# Patient Record
Sex: MALE | Employment: FULL TIME | URBAN - METROPOLITAN AREA
[De-identification: names, ages, dates, MRNs, and addresses within clinical notes are randomized per-mention and may not be internally consistent; named-entity substitution may affect disease eponyms.]

---

## 2022-10-04 ENCOUNTER — APPOINTMENT (OUTPATIENT)
Dept: RADIOLOGY | Facility: MEDICAL CENTER | Age: 57
DRG: 871 | End: 2022-10-04
Attending: STUDENT IN AN ORGANIZED HEALTH CARE EDUCATION/TRAINING PROGRAM
Payer: COMMERCIAL

## 2022-10-04 ENCOUNTER — HOSPITAL ENCOUNTER (INPATIENT)
Facility: MEDICAL CENTER | Age: 57
LOS: 15 days | DRG: 871 | End: 2022-10-19
Attending: EMERGENCY MEDICINE | Admitting: HOSPITALIST
Payer: COMMERCIAL

## 2022-10-04 DIAGNOSIS — R41.82 ALTERED MENTAL STATUS, UNSPECIFIED ALTERED MENTAL STATUS TYPE: ICD-10-CM

## 2022-10-04 DIAGNOSIS — S80.862A TICK BITE OF LEFT LOWER LEG, INITIAL ENCOUNTER: ICD-10-CM

## 2022-10-04 DIAGNOSIS — G93.40 ACUTE ENCEPHALOPATHY: ICD-10-CM
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
## 2022-10-04 DIAGNOSIS — R56.9 SEIZURE (HCC): ICD-10-CM

## 2022-10-04 PROBLEM — J96.01 ACUTE RESPIRATORY FAILURE WITH HYPOXIA (HCC): Status: ACTIVE | Noted: 2022-10-04

## 2022-10-04 PROBLEM — I48.0 PAROXYSMAL A-FIB (HCC): Status: ACTIVE | Noted: 2022-10-04

## 2022-10-04 LAB
ALBUMIN SERPL BCP-MCNC: 3.1 G/DL (ref 3.2–4.9)
ALBUMIN/GLOB SERPL: 1 G/DL
ALP SERPL-CCNC: 66 U/L (ref 30–99)
ALT SERPL-CCNC: 82 U/L (ref 2–50)
AMORPH CRY #/AREA URNS HPF: PRESENT /HPF
ANION GAP SERPL CALC-SCNC: 12 MMOL/L (ref 7–16)
APPEARANCE UR: ABNORMAL
AST SERPL-CCNC: 49 U/L (ref 12–45)
B PARAP IS1001 DNA NPH QL NAA+NON-PROBE: NOT DETECTED
B PERT.PT PRMT NPH QL NAA+NON-PROBE: NOT DETECTED
BACTERIA #/AREA URNS HPF: ABNORMAL /HPF
BASE EXCESS BLDA CALC-SCNC: 7 MMOL/L (ref -4–3)
BASOPHILS # BLD AUTO: 0.8 % (ref 0–1.8)
BASOPHILS # BLD: 0.1 K/UL (ref 0–0.12)
BILIRUB SERPL-MCNC: 0.3 MG/DL (ref 0.1–1.5)
BILIRUB UR QL STRIP.AUTO: NEGATIVE
BODY TEMPERATURE: ABNORMAL DEGREES
BREATHS SETTING VENT: 18
BUN SERPL-MCNC: 37 MG/DL (ref 8–22)
C PNEUM DNA NPH QL NAA+NON-PROBE: NOT DETECTED
CALCIUM SERPL-MCNC: 7.8 MG/DL (ref 8.5–10.5)
CHLORIDE SERPL-SCNC: 101 MMOL/L (ref 96–112)
CO2 BLDA-SCNC: 31 MMOL/L (ref 20–33)
CO2 SERPL-SCNC: 27 MMOL/L (ref 20–33)
COLOR UR: YELLOW
CREAT SERPL-MCNC: 1.14 MG/DL (ref 0.5–1.4)
CRP SERPL HS-MCNC: 3.28 MG/DL (ref 0–0.75)
CRYPTOC AG SER QL LA: NEGATIVE
DELSYS IDSYS: ABNORMAL
EOSINOPHIL # BLD AUTO: 0 K/UL (ref 0–0.51)
EOSINOPHIL NFR BLD: 0 % (ref 0–6.9)
EPI CELLS #/AREA URNS HPF: NEGATIVE /HPF
ERYTHROCYTE [DISTWIDTH] IN BLOOD BY AUTOMATED COUNT: 45.1 FL (ref 35.9–50)
ERYTHROCYTE [SEDIMENTATION RATE] IN BLOOD BY WESTERGREN METHOD: 15 MM/HOUR (ref 0–20)
FLUAV RNA NPH QL NAA+NON-PROBE: NOT DETECTED
FLUAV RNA SPEC QL NAA+PROBE: NEGATIVE
FLUBV RNA NPH QL NAA+NON-PROBE: NOT DETECTED
FLUBV RNA SPEC QL NAA+PROBE: NEGATIVE
GFR SERPLBLD CREATININE-BSD FMLA CKD-EPI: 76 ML/MIN/1.73 M 2
GLOBULIN SER CALC-MCNC: 3 G/DL (ref 1.9–3.5)
GLUCOSE SERPL-MCNC: 181 MG/DL (ref 65–99)
GLUCOSE UR STRIP.AUTO-MCNC: NEGATIVE MG/DL
GRAM STN SPEC: NORMAL
HADV DNA NPH QL NAA+NON-PROBE: NOT DETECTED
HCO3 BLDA-SCNC: 30 MMOL/L (ref 17–25)
HCOV 229E RNA NPH QL NAA+NON-PROBE: NOT DETECTED
HCOV HKU1 RNA NPH QL NAA+NON-PROBE: NOT DETECTED
HCOV NL63 RNA NPH QL NAA+NON-PROBE: NOT DETECTED
HCOV OC43 RNA NPH QL NAA+NON-PROBE: NOT DETECTED
HCT VFR BLD AUTO: 42.2 % (ref 42–52)
HGB BLD-MCNC: 13.5 G/DL (ref 14–18)
HIV 1+2 AB+HIV1 P24 AG SERPL QL IA: NORMAL
HMPV RNA NPH QL NAA+NON-PROBE: NOT DETECTED
HOROWITZ INDEX BLDA+IHG-RTO: 142 MM[HG]
HPIV1 RNA NPH QL NAA+NON-PROBE: NOT DETECTED
HPIV2 RNA NPH QL NAA+NON-PROBE: NOT DETECTED
HPIV3 RNA NPH QL NAA+NON-PROBE: NOT DETECTED
HPIV4 RNA NPH QL NAA+NON-PROBE: NOT DETECTED
HYALINE CASTS #/AREA URNS LPF: ABNORMAL /LPF
KETONES UR STRIP.AUTO-MCNC: NEGATIVE MG/DL
LACTATE SERPL-SCNC: 1.8 MMOL/L (ref 0.5–2)
LACTATE SERPL-SCNC: 2.6 MMOL/L (ref 0.5–2)
LEUKOCYTE ESTERASE UR QL STRIP.AUTO: ABNORMAL
LYMPHOCYTES # BLD AUTO: 5.38 K/UL (ref 1–4.8)
LYMPHOCYTES NFR BLD: 44.1 % (ref 22–41)
M PNEUMO DNA NPH QL NAA+NON-PROBE: NOT DETECTED
MAGNESIUM SERPL-MCNC: 2.8 MG/DL (ref 1.5–2.5)
MANUAL DIFF BLD: NORMAL
MCH RBC QN AUTO: 26 PG (ref 27–33)
MCHC RBC AUTO-ENTMCNC: 32 G/DL (ref 33.7–35.3)
MCV RBC AUTO: 81.3 FL (ref 81.4–97.8)
MICRO URNS: ABNORMAL
MODE IMODE: ABNORMAL
MONOCYTES # BLD AUTO: 1.87 K/UL (ref 0–0.85)
MONOCYTES NFR BLD AUTO: 15.3 % (ref 0–13.4)
MORPHOLOGY BLD-IMP: NORMAL
NEUTROPHILS # BLD AUTO: 4.86 K/UL (ref 1.82–7.42)
NEUTROPHILS NFR BLD: 39.8 % (ref 44–72)
NITRITE UR QL STRIP.AUTO: NEGATIVE
NRBC # BLD AUTO: 0 K/UL
NRBC BLD-RTO: 0 /100 WBC
NT-PROBNP SERPL IA-MCNC: 32 PG/ML (ref 0–125)
O2/TOTAL GAS SETTING VFR VENT: 50 %
PCO2 BLDA: 35.7 MMHG (ref 26–37)
PCO2 TEMP ADJ BLDA: 36.7 MMHG (ref 26–37)
PEEP END EXPIRATORY PRESSURE IPEEP: 10 CMH20
PH BLDA: 7.53 [PH] (ref 7.4–7.5)
PH TEMP ADJ BLDA: 7.52 [PH] (ref 7.4–7.5)
PH UR STRIP.AUTO: 6 [PH] (ref 5–8)
PHOSPHATE SERPL-MCNC: 2.7 MG/DL (ref 2.5–4.5)
PLATELET # BLD AUTO: 221 K/UL (ref 164–446)
PLATELET BLD QL SMEAR: NORMAL
PMV BLD AUTO: 9.5 FL (ref 9–12.9)
PO2 BLDA: 71 MMHG (ref 64–87)
PO2 TEMP ADJ BLDA: 74 MMHG (ref 64–87)
POTASSIUM SERPL-SCNC: 3.9 MMOL/L (ref 3.6–5.5)
PROCALCITONIN SERPL-MCNC: 0.31 NG/ML
PROT SERPL-MCNC: 6.1 G/DL (ref 6–8.2)
PROT UR QL STRIP: NEGATIVE MG/DL
RBC # BLD AUTO: 5.19 M/UL (ref 4.7–6.1)
RBC # URNS HPF: ABNORMAL /HPF
RBC BLD AUTO: PRESENT
RBC UR QL AUTO: ABNORMAL
RSV RNA NPH QL NAA+NON-PROBE: NOT DETECTED
RSV RNA SPEC QL NAA+PROBE: NEGATIVE
RV+EV RNA NPH QL NAA+NON-PROBE: NOT DETECTED
SAO2 % BLDA: 96 % (ref 93–99)
SARS-COV-2 RNA NPH QL NAA+NON-PROBE: NOTDETECTED
SARS-COV-2 RNA RESP QL NAA+PROBE: NOTDETECTED
SCCMEC + MECA PNL NOSE NAA+PROBE: NEGATIVE
SIGNIFICANT IND 70042: NORMAL
SITE SITE: NORMAL
SMUDGE CELLS BLD QL SMEAR: NORMAL
SODIUM SERPL-SCNC: 140 MMOL/L (ref 135–145)
SOURCE SOURCE: NORMAL
SP GR UR STRIP.AUTO: 1.02
SPECIMEN DRAWN FROM PATIENT: ABNORMAL
SPECIMEN SOURCE: NORMAL
TIDAL VOLUME IVT: 500 ML
TROPONIN T SERPL-MCNC: 7 NG/L (ref 6–19)
UROBILINOGEN UR STRIP.AUTO-MCNC: 0.2 MG/DL
WBC # BLD AUTO: 12.2 K/UL (ref 4.8–10.8)
WBC #/AREA URNS HPF: ABNORMAL /HPF

## 2022-10-04 PROCEDURE — 84145 PROCALCITONIN (PCT): CPT

## 2022-10-04 PROCEDURE — 87086 URINE CULTURE/COLONY COUNT: CPT

## 2022-10-04 PROCEDURE — 87899 AGENT NOS ASSAY W/OPTIC: CPT

## 2022-10-04 PROCEDURE — 83516 IMMUNOASSAY NONANTIBODY: CPT | Mod: 91

## 2022-10-04 PROCEDURE — 83880 ASSAY OF NATRIURETIC PEPTIDE: CPT

## 2022-10-04 PROCEDURE — 85007 BL SMEAR W/DIFF WBC COUNT: CPT

## 2022-10-04 PROCEDURE — 81001 URINALYSIS AUTO W/SCOPE: CPT

## 2022-10-04 PROCEDURE — A9270 NON-COVERED ITEM OR SERVICE: HCPCS | Performed by: STUDENT IN AN ORGANIZED HEALTH CARE EDUCATION/TRAINING PROGRAM

## 2022-10-04 PROCEDURE — 87476 LYME DIS DNA AMP PROBE: CPT

## 2022-10-04 PROCEDURE — 94799 UNLISTED PULMONARY SVC/PX: CPT

## 2022-10-04 PROCEDURE — 700102 HCHG RX REV CODE 250 W/ 637 OVERRIDE(OP): Performed by: STUDENT IN AN ORGANIZED HEALTH CARE EDUCATION/TRAINING PROGRAM

## 2022-10-04 PROCEDURE — 87641 MR-STAPH DNA AMP PROBE: CPT

## 2022-10-04 PROCEDURE — 84484 ASSAY OF TROPONIN QUANT: CPT

## 2022-10-04 PROCEDURE — 82803 BLOOD GASES ANY COMBINATION: CPT

## 2022-10-04 PROCEDURE — 86403 PARTICLE AGGLUT ANTBDY SCRN: CPT

## 2022-10-04 PROCEDURE — 87389 HIV-1 AG W/HIV-1&-2 AB AG IA: CPT

## 2022-10-04 PROCEDURE — 86618 LYME DISEASE ANTIBODY: CPT | Mod: 91

## 2022-10-04 PROCEDURE — 80053 COMPREHEN METABOLIC PANEL: CPT

## 2022-10-04 PROCEDURE — 71045 X-RAY EXAM CHEST 1 VIEW: CPT

## 2022-10-04 PROCEDURE — 94002 VENT MGMT INPAT INIT DAY: CPT

## 2022-10-04 PROCEDURE — 99291 CRITICAL CARE FIRST HOUR: CPT | Performed by: STUDENT IN AN ORGANIZED HEALTH CARE EDUCATION/TRAINING PROGRAM

## 2022-10-04 PROCEDURE — 700105 HCHG RX REV CODE 258: Performed by: NURSE PRACTITIONER

## 2022-10-04 PROCEDURE — 70450 CT HEAD/BRAIN W/O DYE: CPT

## 2022-10-04 PROCEDURE — 86140 C-REACTIVE PROTEIN: CPT

## 2022-10-04 PROCEDURE — 700111 HCHG RX REV CODE 636 W/ 250 OVERRIDE (IP): Performed by: STUDENT IN AN ORGANIZED HEALTH CARE EDUCATION/TRAINING PROGRAM

## 2022-10-04 PROCEDURE — C9803 HOPD COVID-19 SPEC COLLECT: HCPCS | Performed by: STUDENT IN AN ORGANIZED HEALTH CARE EDUCATION/TRAINING PROGRAM

## 2022-10-04 PROCEDURE — 87486 CHLMYD PNEUM DNA AMP PROBE: CPT

## 2022-10-04 PROCEDURE — 770022 HCHG ROOM/CARE - ICU (200)

## 2022-10-04 PROCEDURE — 86635 COCCIDIOIDES ANTIBODY: CPT | Mod: 91

## 2022-10-04 PROCEDURE — 87798 DETECT AGENT NOS DNA AMP: CPT

## 2022-10-04 PROCEDURE — 700101 HCHG RX REV CODE 250: Performed by: STUDENT IN AN ORGANIZED HEALTH CARE EDUCATION/TRAINING PROGRAM

## 2022-10-04 PROCEDURE — 700105 HCHG RX REV CODE 258: Performed by: STUDENT IN AN ORGANIZED HEALTH CARE EDUCATION/TRAINING PROGRAM

## 2022-10-04 PROCEDURE — 87305 ASPERGILLUS AG IA: CPT

## 2022-10-04 PROCEDURE — 87040 BLOOD CULTURE FOR BACTERIA: CPT

## 2022-10-04 PROCEDURE — 84100 ASSAY OF PHOSPHORUS: CPT

## 2022-10-04 PROCEDURE — 87070 CULTURE OTHR SPECIMN AEROBIC: CPT

## 2022-10-04 PROCEDURE — 87449 NOS EACH ORGANISM AG IA: CPT | Mod: 91

## 2022-10-04 PROCEDURE — 87633 RESP VIRUS 12-25 TARGETS: CPT

## 2022-10-04 PROCEDURE — 83735 ASSAY OF MAGNESIUM: CPT

## 2022-10-04 PROCEDURE — 87205 SMEAR GRAM STAIN: CPT

## 2022-10-04 PROCEDURE — 36600 WITHDRAWAL OF ARTERIAL BLOOD: CPT

## 2022-10-04 PROCEDURE — 85652 RBC SED RATE AUTOMATED: CPT

## 2022-10-04 PROCEDURE — 85025 COMPLETE CBC W/AUTO DIFF WBC: CPT

## 2022-10-04 PROCEDURE — 0241U HCHG SARS-COV-2 COVID-19 NFCT DS RESP RNA 4 TRGT MIC: CPT

## 2022-10-04 PROCEDURE — 86757 RICKETTSIA ANTIBODY: CPT | Mod: 91

## 2022-10-04 PROCEDURE — 83605 ASSAY OF LACTIC ACID: CPT | Mod: 91

## 2022-10-04 PROCEDURE — 87581 M.PNEUMON DNA AMP PROBE: CPT

## 2022-10-04 RX ORDER — BISACODYL 10 MG
10 SUPPOSITORY, RECTAL RECTAL
Status: DISCONTINUED | OUTPATIENT
Start: 2022-10-04 | End: 2022-10-05

## 2022-10-04 RX ORDER — DEXMEDETOMIDINE HYDROCHLORIDE 4 UG/ML
.1-1.5 INJECTION, SOLUTION INTRAVENOUS CONTINUOUS
Status: DISCONTINUED | OUTPATIENT
Start: 2022-10-04 | End: 2022-10-05

## 2022-10-04 RX ORDER — AMOXICILLIN 250 MG
2 CAPSULE ORAL 2 TIMES DAILY
Status: DISCONTINUED | OUTPATIENT
Start: 2022-10-04 | End: 2022-10-05

## 2022-10-04 RX ORDER — ENOXAPARIN SODIUM 100 MG/ML
40 INJECTION SUBCUTANEOUS DAILY
Status: DISCONTINUED | OUTPATIENT
Start: 2022-10-04 | End: 2022-10-07

## 2022-10-04 RX ORDER — DOXYCYCLINE 100 MG/1
100 TABLET ORAL EVERY 12 HOURS
Status: DISCONTINUED | OUTPATIENT
Start: 2022-10-04 | End: 2022-10-04

## 2022-10-04 RX ORDER — POLYETHYLENE GLYCOL 3350 17 G/17G
1 POWDER, FOR SOLUTION ORAL
Status: DISCONTINUED | OUTPATIENT
Start: 2022-10-04 | End: 2022-10-05

## 2022-10-04 RX ORDER — SODIUM CHLORIDE 9 MG/ML
500 INJECTION, SOLUTION INTRAVENOUS ONCE
Status: COMPLETED | OUTPATIENT
Start: 2022-10-04 | End: 2022-10-05

## 2022-10-04 RX ADMIN — AMPICILLIN SODIUM 2000 MG: 2 INJECTION, POWDER, FOR SOLUTION INTRAMUSCULAR; INTRAVENOUS at 22:27

## 2022-10-04 RX ADMIN — ENOXAPARIN SODIUM 40 MG: 40 INJECTION SUBCUTANEOUS at 18:17

## 2022-10-04 RX ADMIN — FENTANYL CITRATE 100 MCG: 50 INJECTION, SOLUTION INTRAMUSCULAR; INTRAVENOUS at 16:51

## 2022-10-04 RX ADMIN — SODIUM CHLORIDE 500 ML: 9 INJECTION, SOLUTION INTRAVENOUS at 23:30

## 2022-10-04 RX ADMIN — CEFTRIAXONE SODIUM 2 G: 10 INJECTION, POWDER, FOR SOLUTION INTRAVENOUS at 16:27

## 2022-10-04 RX ADMIN — FENTANYL CITRATE 100 MCG: 50 INJECTION, SOLUTION INTRAMUSCULAR; INTRAVENOUS at 20:01

## 2022-10-04 RX ADMIN — DEXMEDETOMIDINE 0.2 MCG/KG/HR: 200 INJECTION, SOLUTION INTRAVENOUS at 13:59

## 2022-10-04 RX ADMIN — ACETAMINOPHEN 650 MG: 325 SUPPOSITORY RECTAL at 20:32

## 2022-10-04 RX ADMIN — DEXMEDETOMIDINE 0.8 MCG/KG/HR: 200 INJECTION, SOLUTION INTRAVENOUS at 20:00

## 2022-10-04 RX ADMIN — FENTANYL CITRATE 100 MCG: 50 INJECTION, SOLUTION INTRAMUSCULAR; INTRAVENOUS at 17:55

## 2022-10-04 RX ADMIN — DOXYCYCLINE 100 MG: 100 INJECTION, POWDER, LYOPHILIZED, FOR SOLUTION INTRAVENOUS at 18:15

## 2022-10-04 RX ADMIN — DEXMEDETOMIDINE 1 MCG/KG/HR: 200 INJECTION, SOLUTION INTRAVENOUS at 17:16

## 2022-10-04 RX ADMIN — FENTANYL CITRATE 100 MCG: 50 INJECTION, SOLUTION INTRAMUSCULAR; INTRAVENOUS at 22:32

## 2022-10-04 RX ADMIN — AMPICILLIN SODIUM 2000 MG: 2 INJECTION, POWDER, FOR SOLUTION INTRAMUSCULAR; INTRAVENOUS at 17:06

## 2022-10-04 RX ADMIN — VANCOMYCIN HYDROCHLORIDE 1750 MG: 500 INJECTION, POWDER, LYOPHILIZED, FOR SOLUTION INTRAVENOUS at 16:27

## 2022-10-04 ASSESSMENT — PAIN DESCRIPTION - PAIN TYPE
TYPE: ACUTE PAIN

## 2022-10-04 NOTE — CONSULTS
Critical Care Consultation    Date of consult: 10/4/2022    Referring Physician  Jose Alberto Ennis M.D.    Reason for Consultation  seizures    History of Presenting Illness  57 y.o. male with history of afib on AC who presented to [facility] [place] on 10/2/22 with fevers, dyspnea, headache, tachycardia, abdominal bloating/distention, anorexia, and petechial rash x2 weeks. This all started apparently after a tick bite on the medial aspect of the LLE in [place]. He initially went to urgent care in [facility] where he was diagnosed with prostatitis and given antibiotics which did not improve his symptoms.  He then presented to [facility] with the above symptoms and was discharged after apparently unremarkable exam.  He presented to the ED again on 10/2/2022 with worsening symptoms.   He was noted to have normal trop, BNP and had mild elevated in transaminases.  Chest x-ray was concerning for congestive heart failure. He as given bronchodilators, ceftriaxone and placed on bipap. He was started on CTX, vancomycin, ampicillin, doxycycline because he initially refused lumbar puncture. he was noted to become progressively more confused on 10/3/2022 so he got MRI (unremarkable) and LP. TTE was unremarkable.  Acyclovir was added after LP was done.     Meds as of 10/3 at OSH include ceftraixone, vanocmycin, ampicillin, doxy, acyclovir, metoprolol, versed, lasix,     Code Status  Full Code    Review of Systems  Review of Systems   Unable to perform ROS: Intubated     Past Medical History  Afib on anticoagulation    Surgical History  None     Family History  No pertinent family history    Social History       Medications  Home Medications    **Home medications have not yet been reviewed for this encounter**       Current Facility-Administered Medications   Medication Dose Route Frequency Provider Last Rate Last Admin    senna-docusate (PERICOLACE or SENOKOT S) 8.6-50 MG per tablet 2 Tablet  2 Tablet  Oral BID Jennifer Pineda M.D.        And    polyethylene glycol/lytes (MIRALAX) PACKET 1 Packet  1 Packet Oral QDAY PRN Jennifer Pineda M.D.        And    magnesium hydroxide (MILK OF MAGNESIA) suspension 30 mL  30 mL Oral QDAY PRN Jennifer Pineda M.D.        And    bisacodyl (DULCOLAX) suppository 10 mg  10 mg Rectal QDAY PRN Jennifer Pineda M.D.        enoxaparin (Lovenox) inj 40 mg  40 mg Subcutaneous DAILY AT 1800 Jennifer Pineda M.D.        dexmedetomidine (PRECEDEX) 400 mcg/100mL NS premix infusion  0.1-1.5 mcg/kg/hr Intravenous Continuous Jennifer Pineda M.D.        doxycycline monohydrate (ADOXA) tablet 100 mg  100 mg Oral Q12HRS Jennifer Pineda M.D.           Allergies  Not on File    Vital Signs last 24 hours       Physical Exam  Physical Exam  Vitals and nursing note reviewed.   Constitutional:       Comments: Intubated, sedated   HENT:      Head: Normocephalic and atraumatic.      Nose: Nose normal.      Mouth/Throat:      Mouth: Mucous membranes are moist.   Eyes:      General: No scleral icterus.  Cardiovascular:      Rate and Rhythm: Normal rate and regular rhythm.   Pulmonary:      Effort: Pulmonary effort is normal. No respiratory distress.      Breath sounds: No wheezing.   Abdominal:      Palpations: Abdomen is soft.   Musculoskeletal:         General: No swelling or deformity.   Skin:     Comments: Fine petechial rash on BLEs. One small ~2mm scab on LLE. Small circular faintly red rash about 2-3cm in diameter with central clearing on posterior RLE above ankle       Fluids  No intake or output data in the 24 hours ending 10/04/22 1355    Laboratory  No results found for this or any previous visit (from the past 48 hour(s)).    Imaging/Labs  OSH 10/3 labs:   AST 63, , Cr 1.2. Bili 0.4. Ammonia 6. UA  trace ketones, no protein.   UDS with positive barbiturates, benzos and opioids (got benzos and opioids during admission)  CXR 10/3 suggestive of worsening heart  failure  CT head 10/2: no acute abnormality.  There may be a small lacunar infarct in left basal ganglia which is similar to the previous study.     Assessment/Plan  Seizures  Altered mental status  Unclear etiology as witnessed tonic-clonic seizure at OSH -concern for meningitis/encephalitis.  He was noted to be progressively more confused during his hospital stay at OSH. CT head at OSH on 10/2 was unremarkable but this was prior to seizure.  -Wean sedation to assess mental status  -Ativan as needed seizures  -EEG  -Infectious work-up as noted below  -CT head now  -We will get LP studies from outside hospital  -Antibiotics as noted below    Sepsis  Petechial rash  Transaminitis  Unclear etiology of patient's petechial rash with the symptoms fever, chills, abdominal pain/bloating and headache -concerning for meningitis as noted above.  He did have a tick bite on left lower extremity which is concerning for tickborne diseases.   -HIV negative at OSH  -We will get LP studies, blood and sputum culture results from outside hospital  -Repeat blood cultures, respiratory culture, UA and urine culture, respiratory viral panel  -Follow-up Fungitell, galactomannan, cocci, crypto, Lyme PCR and antibodies, Rickettsia antibodies  -Continue vancomycin, ceftriaxone and ampicillin for meningitis coverage  -Continue doxycycline for atypical coverage  -Appreciate ID recs -no need for acyclovir at this time  -Follow-up ANCA  -Follow-up urine strep and Legionella    Acute respiratory failure  Patient was noted to have bilateral pulmonary edema at outside hospital with normal TTE and BNP.  He was initially on BiPAP and high flow nasal cannula and then intubated after seizures.   -Lung protective ventilation -wean as tolerated  -Daily SAT/SBT  -Chest x-ray now  -Antibiotics as noted above    Discussed patient condition and risk of morbidity and/or mortality with Family, RN, RT, and Pharmacy.    The patient remains critically ill.   Critical care time = 55 minutes in directly providing and coordinating critical care and extensive data review.  No time overlap and excludes procedures.        Jennifer Pineda MD  Pulmonary and Critical Care Medicine  [facility]

## 2022-10-04 NOTE — PROGRESS NOTES
"Pharmacy Vancomycin Kinetics Note for 10/4/2022     57 y.o. male on Vancomycin day # 2     Vancomycin Indication (Two level/Trough based Dosing): CNS Infection (goal trough 18-22)    Provider specified end date: 10/11/22    Active Antibiotics (From admission, onward)      Ordered     Ordering Provider       Tue Oct 4, 2022  4:01 PM    10/04/22 1601  vancomycin (VANCOCIN) 1,750 mg in  mL IVPB  (vancomycin (VANCOCIN) IV (LD + Maintenance))  EVERY 8 HOURS         Jennifer Pineda M.D.       Tue Oct 4, 2022  3:50 PM    10/04/22 1550  doxycycline (VIBRAMYCIN) 100 mg in  mL IVPB  EVERY 12 HOURS        Note to Pharmacy: Per P&T IV to PO Protocol    Jennifer Pineda M.D.       Tue Oct 4, 2022  2:14 PM    10/04/22 1414  ampicillin (Omnipen) 2,000 mg in  mL IVPB  EVERY 4 HOURS         Jennifer Pineda M.D.    10/04/22 1414  cefTRIAXone (Rocephin) syringe 2 g  2 TIMES DAILY         Jennifer Pineda M.D.    10/04/22 1414  MD Alert...Vancomycin per Pharmacy  PHARMACY TO DOSE        Question:  Indication(s) for vancomycin?  Answer:  Unknown source of infection    Jennifer Pineda M.D.            Dosing Weight: 128 kg (282 lb 3 oz)      Admission History: Admitted on 10/4/2022 for Seizure (HCC) [R56.9]       Allergies:     Penicillins - tolerated ampicillin     Pertinent cultures to date:     Results       Procedure Component Value Units Date/Time    MRSA By PCR (Amp) [[id number]]     Order Status: No result Specimen: Respirate from Nares     BLOOD CULTURE [[id number]] Collected: 10/04/22 1422    Order Status: Sent Specimen: Blood from Peripheral Updated: 10/04/22 1534    Narrative:      Per Hospital Policy: Only change Specimen Src: to \"Line\" if  specified by physician order.    BLOOD CULTURE [[id number]] Collected: 10/04/22 1420    Order Status: Sent Specimen: Blood from Peripheral Updated: 10/04/22 1532    Narrative:      Per Hospital Policy: Only change Specimen Src: to \"Line\" if  specified by " physician order.    Respiratory Panel By PCR [[id number]] Collected: 10/04/22 1432    Order Status: Sent Specimen: Respirate from Nasopharyngeal Updated: 10/04/22 1529    Narrative:      Collected By: [id number] YANCI MAY  Indication for culture:->Patient WITHOUT an indwelling Irene  catheter in place with new onset of Dysuria, Frequency,  Urgency, and/or Suprapubic pain  Collected By: [id number] YANCI MAY  UTILIZATION ALERT: Has Flu/RSV/COVID PCR testing been  performed, resulted reviewed, and consulted with Infectious  Diseases or PICU Provider Teams?->Yes  Have you been in close contact with a person who is suspected  or known to be positive for COVID-19 within the last 30 days  (e.g. last seen that person < 30 days ago)->Yes    COV-2, FLU A/B, AND RSV BY PCR (2-4 HOURS CEPHEID): Collect NP swab in VTM [[id number]] Collected: 10/04/22 1432    Order Status: Completed Specimen: Respirate from Nasopharyngeal Updated: 10/04/22 1528     SARS-CoV-2 Source NP Swab    Narrative:      Collected By: [id number] YANCI MAY    URINALYSIS [[id number]]  (Abnormal) Collected: 10/04/22 1415    Order Status: Completed Specimen: Urine, Cath Updated: 10/04/22 1525     Color Yellow     Character Turbid     Specific Gravity 1.025     Ph 6.0     Glucose Negative mg/dL      Ketones Negative mg/dL      Protein Negative mg/dL      Bilirubin Negative     Urobilinogen, Urine 0.2     Nitrite Negative     Leukocyte Esterase Small     Occult Blood Moderate     Micro Urine Req Microscopic    Narrative:      Collected By: [id number] YANCI MAY  Collected By: [id number] STEFFANIE FRANCO    CULTURE RESPIRATORY W/ GRM STN [[id number]] Collected: 10/04/22 1414    Order Status: Sent Specimen: Respirate from Sputum Updated: 10/04/22 1512    Narrative:      Collected By: [id number] YANCI MAY  Collected By: [id number] STEFFANIE FRANCO    Cryptococcal Antigen, Serum [[id number]]     Order Status: No result     URINE CULTURE(NEW) [[id number]]  "Collected: 10/04/22 1430    Order Status: Sent Specimen: Urine, Irene Cath     FUNGAL BLOOD CULTURE [[id number]] Collected: 10/04/22 1420    Order Status: Sent Specimen: Blood     S. Aureus By PCR, Nasal Complete [[id number]]     Order Status: Canceled Specimen: Respirate             Labs:     Estimated Creatinine Clearance: 105.6 mL/min (by C-G formula based on SCr of 1.14 mg/dL).  Recent Labs     10/04/22  1411   WBC 12.2*   NEUTSPOLYS 39.80*     Recent Labs     10/04/22  1411   BUN 37*   CREATININE 1.14   ALBUMIN 3.1*     No intake or output data in the 24 hours ending 10/04/22 1605   /69   Pulse 95   Resp 18   Ht 1.905 m (6' 3\")   Wt (!) 128 kg (281 lb 4.9 oz)   SpO2 95%  No data recorded.      List concerns for Vancomycin clearance:     BUN/Scr ratio greater than 20:1;Malnutrition/Low albumin;Obesity;Abnormal LFTs    Pharmacokinetics:   Trough kinetics:   No results for input(s): VANCOTROUGH, VANCOPEAK, VANCORANDOM in the last 72 hours.    A/P:   *Loading dose: vancomycin 3000 mg IV X1 on 10/3/22 @ 1415   *Previous maintenance dose: vancomycin 1,500 mg IV Q12Hr (Last dose : 10/4 @01:16)  -  Vancomycin dose: 1750 mg IV Q8hr (14 mg/kg)     -  Next vancomycin level(s): TBD       -  Comments: Patient transferred from another facility where he received vancomycin loading dose and maintenance dose. The prior facility dosed  vancomycin at 12 mg/kg Q12h regimen that when the patient arrived at [facility], the next maintenance dose was outside the 12 hour interval.     Patient arrived with elevated BUN and the BUN/Scr ratio > 20:1, which may indicate some renal dysfunction and risk for vancomycin accumulation. Based on reaching higher trough goals for meningitis/CSF infections, I increased the maintenance dose to ~14 mg/kg Q8Hr. Pharmacy will continue to monitor and adjust dosing.     Thank you for the consult!       Sarai Andersen, PharmD    "

## 2022-10-04 NOTE — H&P
Critical Care H&P      Chief Complaint:  Direct admit  1-2 week history of fevers, dyspnea on exertion, headache    HPI: Josh Trivedi is a 57 y.o. male with PMH of obesity, ALEXANDER, and paroxysmal Afib on xarelto and afib who initially presented to [facility] on 10/2 for increasing dyspnea on exertion, fevers and chills, bloating, and headache. Patient presents as a direct admit to [facility] for higher level of care and ID consult. At time of admission, patient is intubated and sedated due to seizure witnessed on 10/4/2022, altered mental status, and hypoxia; all history comes from chart review and OSH documentation.    Per outside records, symptoms started approximately 2 weeks prior to initial admission where patient was bitten by tick on medial aspect of left lower extremity which was followed by petechial rash of bilateral lower extremities and associated fevers and headaches. Presented to [place] urgent care where he lives; apparently diagnosed with prostatitis, given antibiotics, took them, but did not improve. Presented to ED on 9/30 for fevers, chills, DIAMOND, cough, and headache; had cardiopulmonary work up which was negative, and patient discharged. Presented again for evaluation on 10/2 which lead to current hospitalization.    At [place], patient was worked up for concern of tickborne illness including Lyme disease, [place] spotted fever, and meningitis. LP obtained and CSF has cultures, protein (elevated at 57), cell count, and glucose (normal at 78). Lyme disease ordered for serum, but not for CSF. HIV negative, Covid/Flu negative, UDS positive for barbituate, benzodiazepine, and opiates. Per outside reports, CT head did not demonstrate any acute intracranial abnormalities, but had old lacunar infarcts; MRI Brain also ordered and demonstrated same report.     Review of systems:     Review of Systems   Unable to perform ROS: Intubated     Past Medical History:    has no past medical history on  "file.    FHx:  family history is not on file.    SHx:       Allergies:  Allergies   Allergen Reactions    Penicillins Rash     Rash as a child; tolerated ampicillin & ceftriaxone Oct 2022     Medications:    Current Facility-Administered Medications:     senna-docusate (PERICOLACE or SENOKOT S) 8.6-50 MG per tablet 2 Tablet, 2 Tablet, Oral, BID **AND** polyethylene glycol/lytes (MIRALAX) PACKET 1 Packet, 1 Packet, Oral, QDAY PRN **AND** magnesium hydroxide (MILK OF MAGNESIA) suspension 30 mL, 30 mL, Oral, QDAY PRN **AND** bisacodyl (DULCOLAX) suppository 10 mg, 10 mg, Rectal, QDAY PRN, Jennifer Pineda M.D.    enoxaparin (Lovenox) inj 40 mg, 40 mg, Subcutaneous, DAILY AT 1800, Jennifer Pineda M.D.    dexmedetomidine (PRECEDEX) 400 mcg/100mL NS premix infusion, 0.1-1.5 mcg/kg/hr, Intravenous, Continuous, Jennifer Pineda M.D., Last Rate: 16 mL/hr at 10/04/22 1605, 0.5 mcg/kg/hr at 10/04/22 1605    cefTRIAXone (Rocephin) syringe 2 g, 2 g, Intravenous, BID, Jennifer Pineda M.D. MD Alert...Vancomycin per Pharmacy, , Other, PHARMACY TO DOSE, Jennifer Pineda M.D.    ampicillin (Omnipen) 2,000 mg in  mL IVPB, 2,000 mg, Intravenous, Q4HRS, Jennifer Pineda M.D.    doxycycline (VIBRAMYCIN) 100 mg in  mL IVPB, 100 mg, Intravenous, Q12HRS, Jennifer Pineda M.D.    vancomycin (VANCOCIN) 1,750 mg in  mL IVPB, 1,750 mg, Intravenous, Q8HR, Jennifer Pineda M.D.    Physical Examination:     Vitals:    10/04/22 1300 10/04/22 1400 10/04/22 1500 10/04/22 1600   BP:  113/77 106/69 102/66   Pulse:  (!) 107 95 87   Resp:  18 18 18   TempSrc:    Bladder   SpO2:  99% 95% 97%   Weight: (!) 128 kg (281 lb 4.9 oz)      Height: 1.905 m (6' 3\")          Physical Exam  Constitutional:       General: He is not in acute distress.     Appearance: Normal appearance. He is obese. He is not ill-appearing.   HENT:      Head: Normocephalic and atraumatic.      Mouth/Throat:      Mouth: Mucous membranes are " moist.      Pharynx: Oropharynx is clear.   Eyes:      General: No scleral icterus.     Conjunctiva/sclera: Conjunctivae normal.      Pupils: Pupils are equal, round, and reactive to light.   Cardiovascular:      Rate and Rhythm: Normal rate and regular rhythm.      Pulses: Normal pulses.      Heart sounds: Normal heart sounds.   Pulmonary:      Effort: Pulmonary effort is normal. No respiratory distress.      Breath sounds: Normal breath sounds. No wheezing, rhonchi or rales.      Comments: Intubated and sedated, normal breath sounds  Abdominal:      General: Abdomen is flat. Bowel sounds are normal.      Palpations: Abdomen is soft.      Tenderness: There is no abdominal tenderness.   Genitourinary:     Penis: Normal.       Comments: Irene catheter in place, draining clear yellow urine  Musculoskeletal:      Right lower leg: No edema.      Left lower leg: No edema.   Skin:     General: Skin is warm and dry.      Capillary Refill: Capillary refill takes less than 2 seconds.      Comments: No petechial rash or targetoid lesion observed   Neurological:      Comments: Unable to assess, patient intubated/sedated   Psychiatric:      Comments: Unable to assess, patient intubated/sedated       Objective Data:    Labs:  No results found for: PROTHROMBTM, INR     Lab Results   Component Value Date/Time    WBC 12.2 (H) 10/04/2022 02:11 PM    RBC 5.19 10/04/2022 02:11 PM    HEMOGLOBIN 13.5 (L) 10/04/2022 02:11 PM    HEMATOCRIT 42.2 10/04/2022 02:11 PM    MCV 81.3 (L) 10/04/2022 02:11 PM    MCH 26.0 (L) 10/04/2022 02:11 PM    MCHC 32.0 (L) 10/04/2022 02:11 PM    MPV 9.5 10/04/2022 02:11 PM    NEUTSPOLYS 39.80 (L) 10/04/2022 02:11 PM    LYMPHOCYTES 44.10 (H) 10/04/2022 02:11 PM    MONOCYTES 15.30 (H) 10/04/2022 02:11 PM    EOSINOPHILS 0.00 10/04/2022 02:11 PM    BASOPHILS 0.80 10/04/2022 02:11 PM      Lab Results   Component Value Date/Time    SODIUM 140 10/04/2022 02:11 PM    POTASSIUM 3.9 10/04/2022 02:11 PM    CHLORIDE 101  10/04/2022 02:11 PM    CO2 27 10/04/2022 02:11 PM    GLUCOSE 181 (H) 10/04/2022 02:11 PM    BUN 37 (H) 10/04/2022 02:11 PM    CREATININE 1.14 10/04/2022 02:11 PM      No results found for: CHOLSTRLTOT, LDL, HDL, TRIGLYCERIDE      Lab Results   Component Value Date/Time    ALKPHOSPHAT 66 10/04/2022 02:11 PM    ASTSGOT 49 (H) 10/04/2022 02:11 PM    ALTSGPT 82 (H) 10/04/2022 02:11 PM    TBILIRUBIN 0.3 10/04/2022 02:11 PM        Imaging/Testing:    I interpreted and/or reviewed the patient's imaging    DX-CHEST-PORTABLE (1 VIEW)   Final Result      1.  Satisfactory lines and tubes as detailed.   2.  Diffuse bilateral pulmonary opacities are nonspecific, may be related to extensive pulmonary edema or infection.   3.  There may be layering small pleural effusions. No pneumothorax.      CT-HEAD W/O    (Results Pending)       Assessment and Plan:    Josh Trivedi is a 57 y.o. male with PMH of obesity, ALEXANDER, and paroxysmal Afib on xarelto and afib who initially presented to [facility] on 10/2 for increasing dyspnea on exertion, fevers and chills, bloating, and headache. Patient presents as a direct admit to [facility] for ICU level care after witnessed seizure on 10/4/2022 and ID consult for concern of Lyme/RMSF/Meningitis. ID consulted.    * Seizure (HCC)- (present on admission)  Assessment & Plan  One episode of witnessed tonic-clonic seizure at [facility] on 10/4; prompted intubation and sedation.  -continue intubation/sedation  -consider ativan pushes or midazolam drip for break through seizure  -EEG order placed; neurology consulted per order  -goal SpO2 >90%  -repeat infectious work up (HIV, Covid/Flu, Fungal culture, MRSA, UA)  -lactic acid q4h  -ABG  -follow up CT head    Tick bite of left lower leg  Assessment & Plan  Initially presented to OSH as A&O x4, over course of hospitalization, patient became delirious, A&O x0; one episode of witnessed seizure on 10/4, now intubated and sedated. History of tick  bite approximately 2 weeks ago with accompanying petechial rash and headaches/subjective fevers since then. Currently awaiting further LP studies and lyme serology.  -telemetry  -ID consulted, appreciate recs  -continue ampicillin, ceftriaxone, doxycycline and vancomycin to cover for Lyme/RMSF/meningitis  -Further infectious cause work up (HIV, Covid/Flu, Fungal cultures, MRSA, UA)      Acute respiratory failure with hypoxia (HCC)  Assessment & Plan  Patient initially requiring nasal cannula at 2-3L, then increased to HFNC, to BIPAP and then to being intubated and sedated and transferred to [facility]. Chest XR demonstrates   -continue intubation/sedation  -Goal SpO2 > 90%  -RT/O2 protocol  -ABCDEF bundle  -SAT/SBT as tolerated  -diuresis as appropriate  -lactic acid q4h, ABG ordered    Paroxysmal A-fib (HCC)  Assessment & Plan  History of paroxysmal afib on metoprolol and xarelto.  -telemetry  -restart metoprolol as clinically appropriate  -hold xarelto, potentially may need repeat LP  -DVT prophylaxis      Ruben Staples MD  PGY3 Internal Medicine

## 2022-10-04 NOTE — PLAN OF CARE (IOPOC)
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  He has received broad-spectrum antimicrobial therapy  Pertinent imaging & lab results: Lymphocyte predominant leukocytosis, LP w/ negative gram stain  Code Status: FULL per transferring provider, I personally verified with the transferring provider patient's code status and the transferring provider has confirmed this with the patient.  Further work up or recommendations prior to transfer:   Consultants called prior to transfer and pertinent input from consultants:   Patient accepted for transfer: YES  Consultants to be called upon arrival: Neurology, ID  Floor requested: RICU  ADT order placed for accepted patient: YES    Please inform the Intensivist upon assignment of an ICU bed and then again on arrival of the patient.

## 2022-10-04 NOTE — ASSESSMENT & PLAN NOTE
Initially presented to OSH as A&O x4, over course of hospitalization, patient became delirious, A&O x0; one episode of witnessed seizure on 10/4, now intubated and sedated. History of tick bite approximately 2 weeks ago with accompanying petechial rash and headaches/subjective fevers since then. Currently awaiting further LP studies and lyme serology.  -telemetry  -ID consulted, appreciate recs  -continue ceftriaxone, doxycycline to cover for Lyme/RMSF/meningitis  -Further infectious cause work up (Fungitell, galactomannan, cocci, crypto, Lyme PCR and antibodies, Rickettsia antibodies)

## 2022-10-04 NOTE — ASSESSMENT & PLAN NOTE
History of paroxysmal afib on metoprolol and xarelto.  -telemetry  -restart metoprolol as clinically appropriate  -hold xarelto, potentially may need repeat LP  -DVT prophylaxis

## 2022-10-04 NOTE — CARE PLAN
Problem: Ventilation  Goal: Ability to achieve and maintain unassisted ventilation or tolerate decreased levels of ventilator support  Description: Target End Date:  4 days     Document on Vent flowsheet    1.  Support and monitor invasive and noninvasive mechanical ventilation  2.  Monitor ventilator weaning response  3.  Perform ventilator associated pneumonia prevention interventions  4.  Manage ventilation therapy by monitoring diagnostic test results  Outcome: Progressing                                       Ventilator Daily Summary     Vent Day #1     APVCMV 15/500/+10/ 50%       Plan: Continue current ventilator settings and wean mechanical ventilation as tolerated per physician orders

## 2022-10-04 NOTE — ASSESSMENT & PLAN NOTE
One episode of witnessed tonic-clonic seizure at [facility] on 10/4; prompted intubation and sedation. Unclear etiology, but given positive UDS and possible tickborne illness, both may have contributed to seizure like activity.  -Trial SBT/SAT  -decrease propofol/dexmedetomidine   -consider ativan pushes for break through seizure  -consider EEG  -goal SpO2 >90%  -repeat infectious work up (Fungitell, galactomannan, cocci, crypto, Lyme PCR and antibodies, Rickettsia antibodies)  -follow up CT head - no acute findings  -consider repeat LP if lab unable to come in time

## 2022-10-04 NOTE — ASSESSMENT & PLAN NOTE
Patient initially requiring nasal cannula at 2-3L, then increased to HFNC, to BIPAP and then to being intubated and sedated and transferred to [facility]. Chest XR demonstrates. Previous work up for CHF negative at OSH.  -continue intubation/sedation  -Goal SpO2 > 90%  -RT/O2 protocol  -ABCDEF bundle  -SAT/SBT as tolerated  -titrate down propofol/dexmedetomidine  -diuresis as appropriate  -repeat echo

## 2022-10-05 ENCOUNTER — APPOINTMENT (OUTPATIENT)
Dept: RADIOLOGY | Facility: MEDICAL CENTER | Age: 57
DRG: 871 | End: 2022-10-05
Attending: STUDENT IN AN ORGANIZED HEALTH CARE EDUCATION/TRAINING PROGRAM
Payer: COMMERCIAL

## 2022-10-05 LAB
ALBUMIN SERPL BCP-MCNC: 2.4 G/DL (ref 3.2–4.9)
ALBUMIN/GLOB SERPL: 0.7 G/DL
ALP SERPL-CCNC: 58 U/L (ref 30–99)
ALT SERPL-CCNC: 68 U/L (ref 2–50)
ANION GAP SERPL CALC-SCNC: 7 MMOL/L (ref 7–16)
ANISOCYTOSIS BLD QL SMEAR: ABNORMAL
AST SERPL-CCNC: 52 U/L (ref 12–45)
BASE EXCESS BLDA CALC-SCNC: 5 MMOL/L (ref -4–3)
BASOPHILS # BLD AUTO: 0 % (ref 0–1.8)
BASOPHILS # BLD: 0 K/UL (ref 0–0.12)
BILIRUB SERPL-MCNC: 0.3 MG/DL (ref 0.1–1.5)
BODY TEMPERATURE: ABNORMAL DEGREES
BREATHS SETTING VENT: 14
BUN SERPL-MCNC: 39 MG/DL (ref 8–22)
CALCIUM SERPL-MCNC: 7.4 MG/DL (ref 8.5–10.5)
CHLORIDE SERPL-SCNC: 108 MMOL/L (ref 96–112)
CK SERPL-CCNC: 1127 U/L (ref 0–154)
CK SERPL-CCNC: 1370 U/L (ref 0–154)
CO2 BLDA-SCNC: 31 MMOL/L (ref 20–33)
CO2 SERPL-SCNC: 29 MMOL/L (ref 20–33)
CREAT SERPL-MCNC: 1.05 MG/DL (ref 0.5–1.4)
DELSYS IDSYS: ABNORMAL
END TIDAL CARBON DIOXIDE IECO2: 51 MMHG
EOSINOPHIL # BLD AUTO: 0 K/UL (ref 0–0.51)
EOSINOPHIL NFR BLD: 0 % (ref 0–6.9)
ERYTHROCYTE [DISTWIDTH] IN BLOOD BY AUTOMATED COUNT: 44.9 FL (ref 35.9–50)
GFR SERPLBLD CREATININE-BSD FMLA CKD-EPI: 84 ML/MIN/1.73 M 2
GLOBULIN SER CALC-MCNC: 3.3 G/DL (ref 1.9–3.5)
GLUCOSE SERPL-MCNC: 168 MG/DL (ref 65–99)
HCO3 BLDA-SCNC: 29.6 MMOL/L (ref 17–25)
HCT VFR BLD AUTO: 38 % (ref 42–52)
HGB BLD-MCNC: 11.8 G/DL (ref 14–18)
HOROWITZ INDEX BLDA+IHG-RTO: 154 MM[HG]
LACTATE SERPL-SCNC: 1.7 MMOL/L (ref 0.5–2)
LYMPHOCYTES # BLD AUTO: 4.52 K/UL (ref 1–4.8)
LYMPHOCYTES NFR BLD: 43.9 % (ref 22–41)
MAGNESIUM SERPL-MCNC: 2.8 MG/DL (ref 1.5–2.5)
MANUAL DIFF BLD: NORMAL
MCH RBC QN AUTO: 25.8 PG (ref 27–33)
MCHC RBC AUTO-ENTMCNC: 31.1 G/DL (ref 33.7–35.3)
MCV RBC AUTO: 83.2 FL (ref 81.4–97.8)
MICROCYTES BLD QL SMEAR: ABNORMAL
MODE IMODE: ABNORMAL
MONOCYTES # BLD AUTO: 0.99 K/UL (ref 0–0.85)
MONOCYTES NFR BLD AUTO: 9.6 % (ref 0–13.4)
MORPHOLOGY BLD-IMP: NORMAL
MYELOCYTES NFR BLD MANUAL: 0.9 %
NEUTROPHILS # BLD AUTO: 4.7 K/UL (ref 1.82–7.42)
NEUTROPHILS NFR BLD: 43 % (ref 44–72)
NEUTS BAND NFR BLD MANUAL: 2.6 % (ref 0–10)
NRBC # BLD AUTO: 0 K/UL
NRBC BLD-RTO: 0 /100 WBC
O2/TOTAL GAS SETTING VFR VENT: 50 %
OVALOCYTES BLD QL SMEAR: NORMAL
PCO2 BLDA: 44.3 MMHG (ref 26–37)
PCO2 TEMP ADJ BLDA: 48.1 MMHG (ref 26–37)
PEEP END EXPIRATORY PRESSURE IPEEP: 10 CMH20
PH BLDA: 7.43 [PH] (ref 7.4–7.5)
PH TEMP ADJ BLDA: 7.41 [PH] (ref 7.4–7.5)
PHOSPHATE SERPL-MCNC: 2.6 MG/DL (ref 2.5–4.5)
PLATELET # BLD AUTO: 220 K/UL (ref 164–446)
PLATELET BLD QL SMEAR: NORMAL
PMV BLD AUTO: 9.5 FL (ref 9–12.9)
PO2 BLDA: 77 MMHG (ref 64–87)
PO2 TEMP ADJ BLDA: 88 MMHG (ref 64–87)
POIKILOCYTOSIS BLD QL SMEAR: NORMAL
POLYCHROMASIA BLD QL SMEAR: NORMAL
POTASSIUM SERPL-SCNC: 4.2 MMOL/L (ref 3.6–5.5)
PROT SERPL-MCNC: 5.7 G/DL (ref 6–8.2)
RBC # BLD AUTO: 4.57 M/UL (ref 4.7–6.1)
RBC BLD AUTO: PRESENT
SAO2 % BLDA: 96 % (ref 93–99)
SODIUM SERPL-SCNC: 144 MMOL/L (ref 135–145)
SPECIMEN DRAWN FROM PATIENT: ABNORMAL
TIDAL VOLUME IVT: 500 ML
TRIGL SERPL-MCNC: 305 MG/DL (ref 0–149)
WBC # BLD AUTO: 10.3 K/UL (ref 4.8–10.8)

## 2022-10-05 PROCEDURE — 83735 ASSAY OF MAGNESIUM: CPT

## 2022-10-05 PROCEDURE — 80053 COMPREHEN METABOLIC PANEL: CPT

## 2022-10-05 PROCEDURE — 700102 HCHG RX REV CODE 250 W/ 637 OVERRIDE(OP): Performed by: STUDENT IN AN ORGANIZED HEALTH CARE EDUCATION/TRAINING PROGRAM

## 2022-10-05 PROCEDURE — 82550 ASSAY OF CK (CPK): CPT

## 2022-10-05 PROCEDURE — 700105 HCHG RX REV CODE 258: Performed by: NURSE PRACTITIONER

## 2022-10-05 PROCEDURE — A9270 NON-COVERED ITEM OR SERVICE: HCPCS | Performed by: NURSE PRACTITIONER

## 2022-10-05 PROCEDURE — 99223 1ST HOSP IP/OBS HIGH 75: CPT | Mod: GC | Performed by: INTERNAL MEDICINE

## 2022-10-05 PROCEDURE — 94003 VENT MGMT INPAT SUBQ DAY: CPT

## 2022-10-05 PROCEDURE — 700117 HCHG RX CONTRAST REV CODE 255: Performed by: STUDENT IN AN ORGANIZED HEALTH CARE EDUCATION/TRAINING PROGRAM

## 2022-10-05 PROCEDURE — 84100 ASSAY OF PHOSPHORUS: CPT

## 2022-10-05 PROCEDURE — 700101 HCHG RX REV CODE 250: Performed by: NURSE PRACTITIONER

## 2022-10-05 PROCEDURE — 700101 HCHG RX REV CODE 250: Performed by: STUDENT IN AN ORGANIZED HEALTH CARE EDUCATION/TRAINING PROGRAM

## 2022-10-05 PROCEDURE — 700111 HCHG RX REV CODE 636 W/ 250 OVERRIDE (IP): Performed by: STUDENT IN AN ORGANIZED HEALTH CARE EDUCATION/TRAINING PROGRAM

## 2022-10-05 PROCEDURE — 82803 BLOOD GASES ANY COMBINATION: CPT

## 2022-10-05 PROCEDURE — 770022 HCHG ROOM/CARE - ICU (200)

## 2022-10-05 PROCEDURE — 84478 ASSAY OF TRIGLYCERIDES: CPT

## 2022-10-05 PROCEDURE — A9270 NON-COVERED ITEM OR SERVICE: HCPCS | Performed by: STUDENT IN AN ORGANIZED HEALTH CARE EDUCATION/TRAINING PROGRAM

## 2022-10-05 PROCEDURE — 93005 ELECTROCARDIOGRAM TRACING: CPT | Performed by: STUDENT IN AN ORGANIZED HEALTH CARE EDUCATION/TRAINING PROGRAM

## 2022-10-05 PROCEDURE — 700105 HCHG RX REV CODE 258: Performed by: STUDENT IN AN ORGANIZED HEALTH CARE EDUCATION/TRAINING PROGRAM

## 2022-10-05 PROCEDURE — 99291 CRITICAL CARE FIRST HOUR: CPT | Performed by: STUDENT IN AN ORGANIZED HEALTH CARE EDUCATION/TRAINING PROGRAM

## 2022-10-05 PROCEDURE — 85007 BL SMEAR W/DIFF WBC COUNT: CPT

## 2022-10-05 PROCEDURE — 71275 CT ANGIOGRAPHY CHEST: CPT

## 2022-10-05 PROCEDURE — 700111 HCHG RX REV CODE 636 W/ 250 OVERRIDE (IP): Performed by: NURSE PRACTITIONER

## 2022-10-05 PROCEDURE — 700102 HCHG RX REV CODE 250 W/ 637 OVERRIDE(OP): Performed by: NURSE PRACTITIONER

## 2022-10-05 PROCEDURE — 94799 UNLISTED PULMONARY SVC/PX: CPT

## 2022-10-05 PROCEDURE — 36600 WITHDRAWAL OF ARTERIAL BLOOD: CPT

## 2022-10-05 PROCEDURE — 83605 ASSAY OF LACTIC ACID: CPT

## 2022-10-05 PROCEDURE — 85025 COMPLETE CBC W/AUTO DIFF WBC: CPT

## 2022-10-05 RX ORDER — SODIUM CHLORIDE, SODIUM LACTATE, POTASSIUM CHLORIDE, AND CALCIUM CHLORIDE .6; .31; .03; .02 G/100ML; G/100ML; G/100ML; G/100ML
1000 INJECTION, SOLUTION INTRAVENOUS ONCE
Status: DISCONTINUED | OUTPATIENT
Start: 2022-10-05 | End: 2022-10-05

## 2022-10-05 RX ORDER — SODIUM CHLORIDE, SODIUM LACTATE, POTASSIUM CHLORIDE, AND CALCIUM CHLORIDE .6; .31; .03; .02 G/100ML; G/100ML; G/100ML; G/100ML
500 INJECTION, SOLUTION INTRAVENOUS ONCE
Status: COMPLETED | OUTPATIENT
Start: 2022-10-05 | End: 2022-10-05

## 2022-10-05 RX ORDER — POLYETHYLENE GLYCOL 3350 17 G/17G
1 POWDER, FOR SOLUTION ORAL
Status: DISCONTINUED | OUTPATIENT
Start: 2022-10-05 | End: 2022-10-07

## 2022-10-05 RX ORDER — NOREPINEPHRINE BITARTRATE 0.03 MG/ML
0-30 INJECTION, SOLUTION INTRAVENOUS CONTINUOUS
Status: DISCONTINUED | OUTPATIENT
Start: 2022-10-05 | End: 2022-10-09

## 2022-10-05 RX ORDER — SODIUM CHLORIDE, SODIUM LACTATE, POTASSIUM CHLORIDE, AND CALCIUM CHLORIDE .6; .31; .03; .02 G/100ML; G/100ML; G/100ML; G/100ML
1000 INJECTION, SOLUTION INTRAVENOUS ONCE
Status: COMPLETED | OUTPATIENT
Start: 2022-10-05 | End: 2022-10-05

## 2022-10-05 RX ORDER — ACETAMINOPHEN 650 MG/1
975 SUPPOSITORY RECTAL EVERY 6 HOURS PRN
Status: DISCONTINUED | OUTPATIENT
Start: 2022-10-05 | End: 2022-10-06

## 2022-10-05 RX ORDER — FAMOTIDINE 20 MG/1
20 TABLET, FILM COATED ORAL EVERY 12 HOURS
Status: DISCONTINUED | OUTPATIENT
Start: 2022-10-05 | End: 2022-10-07

## 2022-10-05 RX ORDER — SODIUM CHLORIDE, SODIUM LACTATE, POTASSIUM CHLORIDE, CALCIUM CHLORIDE 600; 310; 30; 20 MG/100ML; MG/100ML; MG/100ML; MG/100ML
INJECTION, SOLUTION INTRAVENOUS CONTINUOUS
Status: DISCONTINUED | OUTPATIENT
Start: 2022-10-05 | End: 2022-10-07

## 2022-10-05 RX ORDER — AMOXICILLIN 250 MG
2 CAPSULE ORAL 2 TIMES DAILY
Status: DISCONTINUED | OUTPATIENT
Start: 2022-10-05 | End: 2022-10-07

## 2022-10-05 RX ORDER — FAMOTIDINE 20 MG/1
20 TABLET, FILM COATED ORAL EVERY 12 HOURS
Status: DISCONTINUED | OUTPATIENT
Start: 2022-10-05 | End: 2022-10-05

## 2022-10-05 RX ORDER — BISACODYL 10 MG
10 SUPPOSITORY, RECTAL RECTAL
Status: DISCONTINUED | OUTPATIENT
Start: 2022-10-05 | End: 2022-10-07

## 2022-10-05 RX ORDER — DEXMEDETOMIDINE HYDROCHLORIDE 4 UG/ML
0-.7 INJECTION, SOLUTION INTRAVENOUS CONTINUOUS
Status: DISCONTINUED | OUTPATIENT
Start: 2022-10-05 | End: 2022-10-07

## 2022-10-05 RX ORDER — MEPERIDINE HYDROCHLORIDE 50 MG/ML
25 INJECTION INTRAMUSCULAR; INTRAVENOUS; SUBCUTANEOUS
Status: DISCONTINUED | OUTPATIENT
Start: 2022-10-05 | End: 2022-10-05

## 2022-10-05 RX ADMIN — FENTANYL CITRATE 100 MCG: 50 INJECTION, SOLUTION INTRAMUSCULAR; INTRAVENOUS at 03:09

## 2022-10-05 RX ADMIN — VANCOMYCIN HYDROCHLORIDE 1750 MG: 500 INJECTION, POWDER, LYOPHILIZED, FOR SOLUTION INTRAVENOUS at 01:04

## 2022-10-05 RX ADMIN — DOXYCYCLINE 100 MG: 100 INJECTION, POWDER, LYOPHILIZED, FOR SOLUTION INTRAVENOUS at 17:29

## 2022-10-05 RX ADMIN — ACETAMINOPHEN 650 MG: 325 SUPPOSITORY RECTAL at 02:26

## 2022-10-05 RX ADMIN — FAMOTIDINE 20 MG: 10 INJECTION, SOLUTION INTRAVENOUS at 17:18

## 2022-10-05 RX ADMIN — PROPOFOL 50 MCG/KG/MIN: 10 INJECTION, EMULSION INTRAVENOUS at 23:01

## 2022-10-05 RX ADMIN — DOCUSATE SODIUM 50 MG AND SENNOSIDES 8.6 MG 2 TABLET: 8.6; 5 TABLET, FILM COATED ORAL at 17:46

## 2022-10-05 RX ADMIN — IOHEXOL 45 ML: 350 INJECTION, SOLUTION INTRAVENOUS at 22:29

## 2022-10-05 RX ADMIN — PROPOFOL 30 MCG/KG/MIN: 10 INJECTION, EMULSION INTRAVENOUS at 09:35

## 2022-10-05 RX ADMIN — PROPOFOL 20 MCG/KG/MIN: 10 INJECTION, EMULSION INTRAVENOUS at 04:34

## 2022-10-05 RX ADMIN — SODIUM CHLORIDE, POTASSIUM CHLORIDE, SODIUM LACTATE AND CALCIUM CHLORIDE 1000 ML: 600; 310; 30; 20 INJECTION, SOLUTION INTRAVENOUS at 14:35

## 2022-10-05 RX ADMIN — AMPICILLIN SODIUM 2000 MG: 2 INJECTION, POWDER, FOR SOLUTION INTRAMUSCULAR; INTRAVENOUS at 02:26

## 2022-10-05 RX ADMIN — SODIUM CHLORIDE, POTASSIUM CHLORIDE, SODIUM LACTATE AND CALCIUM CHLORIDE: 600; 310; 30; 20 INJECTION, SOLUTION INTRAVENOUS at 04:43

## 2022-10-05 RX ADMIN — NOREPINEPHRINE BITARTRATE 5 MCG/MIN: 1 INJECTION, SOLUTION, CONCENTRATE INTRAVENOUS at 16:49

## 2022-10-05 RX ADMIN — SODIUM CHLORIDE, POTASSIUM CHLORIDE, SODIUM LACTATE AND CALCIUM CHLORIDE 500 ML: 600; 310; 30; 20 INJECTION, SOLUTION INTRAVENOUS at 05:53

## 2022-10-05 RX ADMIN — PROPOFOL 50 MCG/KG/MIN: 10 INJECTION, EMULSION INTRAVENOUS at 17:46

## 2022-10-05 RX ADMIN — ENOXAPARIN SODIUM 40 MG: 40 INJECTION SUBCUTANEOUS at 17:18

## 2022-10-05 RX ADMIN — FENTANYL CITRATE 100 MCG: 50 INJECTION, SOLUTION INTRAMUSCULAR; INTRAVENOUS at 03:44

## 2022-10-05 RX ADMIN — CEFTRIAXONE SODIUM 2 G: 10 INJECTION, POWDER, FOR SOLUTION INTRAVENOUS at 05:35

## 2022-10-05 RX ADMIN — PROPOFOL 50 MCG/KG/MIN: 10 INJECTION, EMULSION INTRAVENOUS at 15:19

## 2022-10-05 RX ADMIN — FAMOTIDINE 20 MG: 10 INJECTION, SOLUTION INTRAVENOUS at 11:08

## 2022-10-05 RX ADMIN — DEXMEDETOMIDINE 0.7 MCG/KG/HR: 200 INJECTION, SOLUTION INTRAVENOUS at 03:20

## 2022-10-05 RX ADMIN — PROPOFOL 50 MCG/KG/MIN: 10 INJECTION, EMULSION INTRAVENOUS at 20:51

## 2022-10-05 RX ADMIN — PROPOFOL 60 MCG/KG/MIN: 10 INJECTION, EMULSION INTRAVENOUS at 07:02

## 2022-10-05 RX ADMIN — PROPOFOL 40 MCG/KG/MIN: 10 INJECTION, EMULSION INTRAVENOUS at 13:00

## 2022-10-05 RX ADMIN — SODIUM CHLORIDE, POTASSIUM CHLORIDE, SODIUM LACTATE AND CALCIUM CHLORIDE 1000 ML: 600; 310; 30; 20 INJECTION, SOLUTION INTRAVENOUS at 09:00

## 2022-10-05 RX ADMIN — ACETAMINOPHEN 975 MG: 325 SUPPOSITORY RECTAL at 04:37

## 2022-10-05 RX ADMIN — DEXMEDETOMIDINE 0.8 MCG/KG/HR: 200 INJECTION, SOLUTION INTRAVENOUS at 00:00

## 2022-10-05 RX ADMIN — SODIUM CHLORIDE, POTASSIUM CHLORIDE, SODIUM LACTATE AND CALCIUM CHLORIDE: 600; 310; 30; 20 INJECTION, SOLUTION INTRAVENOUS at 14:15

## 2022-10-05 RX ADMIN — DOXYCYCLINE 100 MG: 100 INJECTION, POWDER, LYOPHILIZED, FOR SOLUTION INTRAVENOUS at 05:49

## 2022-10-05 RX ADMIN — VANCOMYCIN HYDROCHLORIDE 1750 MG: 500 INJECTION, POWDER, LYOPHILIZED, FOR SOLUTION INTRAVENOUS at 09:38

## 2022-10-05 RX ADMIN — Medication 50 MCG/HR: at 02:37

## 2022-10-05 RX ADMIN — CEFTRIAXONE SODIUM 2 G: 10 INJECTION, POWDER, FOR SOLUTION INTRAVENOUS at 17:29

## 2022-10-05 RX ADMIN — AMPICILLIN SODIUM 2000 MG: 2 INJECTION, POWDER, FOR SOLUTION INTRAMUSCULAR; INTRAVENOUS at 09:40

## 2022-10-05 RX ADMIN — FENTANYL CITRATE 100 MCG: 50 INJECTION, SOLUTION INTRAMUSCULAR; INTRAVENOUS at 02:36

## 2022-10-05 RX ADMIN — MEPERIDINE HYDROCHLORIDE 25 MG: 50 INJECTION INTRAMUSCULAR; INTRAVENOUS; SUBCUTANEOUS at 05:08

## 2022-10-05 RX ADMIN — FENTANYL CITRATE 100 MCG: 50 INJECTION, SOLUTION INTRAMUSCULAR; INTRAVENOUS at 01:03

## 2022-10-05 RX ADMIN — Medication 150 MCG/HR: at 12:12

## 2022-10-05 RX ADMIN — AMPICILLIN SODIUM 2000 MG: 2 INJECTION, POWDER, FOR SOLUTION INTRAMUSCULAR; INTRAVENOUS at 05:49

## 2022-10-05 ASSESSMENT — COGNITIVE AND FUNCTIONAL STATUS - GENERAL
MOVING FROM LYING ON BACK TO SITTING ON SIDE OF FLAT BED: UNABLE
DRESSING REGULAR UPPER BODY CLOTHING: TOTAL
DRESSING REGULAR LOWER BODY CLOTHING: TOTAL
CLIMB 3 TO 5 STEPS WITH RAILING: TOTAL
DAILY ACTIVITIY SCORE: 6
MOVING TO AND FROM BED TO CHAIR: UNABLE
EATING MEALS: TOTAL
TOILETING: TOTAL
WALKING IN HOSPITAL ROOM: TOTAL
STANDING UP FROM CHAIR USING ARMS: TOTAL
TURNING FROM BACK TO SIDE WHILE IN FLAT BAD: UNABLE
HELP NEEDED FOR BATHING: TOTAL
SUGGESTED CMS G CODE MODIFIER MOBILITY: CN
SUGGESTED CMS G CODE MODIFIER DAILY ACTIVITY: CN
MOBILITY SCORE: 6
PERSONAL GROOMING: TOTAL

## 2022-10-05 ASSESSMENT — PAIN DESCRIPTION - PAIN TYPE
TYPE: ACUTE PAIN

## 2022-10-05 ASSESSMENT — LIFESTYLE VARIABLES
DOES PATIENT WANT TO STOP DRINKING: CANNOT ASSESS
REASON UNABLE TO ASSESS: UTA

## 2022-10-05 ASSESSMENT — FIBROSIS 4 INDEX
FIB4 SCORE: 1.576481562736164328
FIB4 SCORE: 1.576481562736164328

## 2022-10-05 NOTE — CONSULTS
"INFECTIOUS DISEASES INPATIENT CONSULT NOTE     Date of Service: 10/5/2022    Consult Requested By: Jose Alberto Ennis M.D.    Reason for Consultation: possible lyme vs breann mountain spotted fever    History of Present Illness:   Josh Trivedi is a 57 y.o. male with past medical history significant for paroxysmal A. fib on Xarelto, obesity, ALEXANDER and no history of epilepsy admitted 10/4/2022 from [facility] ER for dyspnea, rash, and history of tick bite.  Patient is sedated and intubated.  Following history was obtained by patient's wife and mother and from the EMR.  Approximately 2 weeks ago, the patient was [place] the [place] in [place], [place]. The patient noticed a tick on his ankle, which he reportedly pulled off with his fingers. The patient in the following week began to experience fevers, chills, malaise, \"petechial\" rash on his bilateral lower extremities, headaches, and neck pain.  The patient's family denies any targetoid lesions.  The patient presented to an urgent care facility while visiting [place] with his family, where he was diagnosed with prostatitis and sent home on an unknown antibiotic.  Patient continued to fever and have malaise with new dyspnea following the course of antibiotics.  He then reported to [facility] where he was given headache cocktail and sent home.  The patient presented again to the [facility] ER for worsening dyspnea.  ER physicians at that time were suspicious of tickborne illness, performed LP for further work-up, and transferred the patient for higher level of care for further work-up of possible tickborne illness.  While inpatient, the patient experienced a witnessed tonic-clonic seizure.  Oxygen saturations dropped into the 60s and 70s.  At that time, the patient was sedated and intubated.    I personally spoke with the [facility] inpatient manager, Kristan ([phone] extension [phone]).  She reviewed some of the patient's lab " values over the phone, including CSF protein 57, CSF glucose 70, no CSF cytology done, CSF gram stain negative, no growth to date on culture of CSF.  There are send out labs from [facility] for a CSF Fungitell, RMSF antibody of CSF, and Aspergillus antibody of CSF, which are all pending.     ROS not possible due to patient sedated and intubated.    PMH:   No past medical history on file.    PSH:  No past surgical history on file.    FAMILY HX:  No family history on file.    SOCIAL HX:  Social History     Socioeconomic History    Marital status:      Spouse name: Not on file    Number of children: Not on file    Years of education: Not on file    Highest education level: Not on file   Occupational History    Not on file   Tobacco Use    Smoking status: Never    Smokeless tobacco: Never   Vaping Use    Vaping Use: Never used   Substance and Sexual Activity    Alcohol use: Never    Drug use: Never    Sexual activity: Not on file   Other Topics Concern    Not on file   Social History Narrative    Not on file     Social Determinants of Health     Financial Resource Strain: Not on file   Food Insecurity: Not on file   Transportation Needs: Not on file   Physical Activity: Not on file   Stress: Not on file   Social Connections: Not on file   Intimate Partner Violence: Not on file   Housing Stability: Not on file     Social History     Tobacco Use   Smoking Status Never   Smokeless Tobacco Never     Social History     Substance and Sexual Activity   Alcohol Use Never       Allergies/Intolerances:  Allergies   Allergen Reactions    Penicillins Rash     Rash as a child; tolerated ampicillin & ceftriaxone Oct 2022       History unable to be reviewed with the patient due to patient sedation    Other Current Medications:    Current Facility-Administered Medications:     fentaNYL (SUBLIMAZE) injection 50 mcg, 50 mcg, Intravenous, Q15 MIN PRN **AND** fentaNYL (SUBLIMAZE) injection 100 mcg, 100 mcg, Intravenous, Q15  MIN PRN, 100 mcg at 10/05/22 0344 **AND** fentaNYL (SUBLIMAZE) 50 mcg/mL in 50mL (Continuous Infusion), , Intravenous, Continuous, Last Rate: 3 mL/hr at 10/05/22 1212, 150 mcg/hr at 10/05/22 1212 **AND** dexmedetomidine (PRECEDEX) 400 mcg/100mL NS premix infusion, 0-0.7 mcg/kg/hr, Intravenous, Continuous, Mary Alice L. Latona, Stopped at 10/05/22 0444    propofol (DIPRIVAN) injection, 0-80 mcg/kg/min, Intravenous, Continuous, Last Rate: 30.7 mL/hr at 10/05/22 1300, 40 mcg/kg/min at 10/05/22 1300 **AND** Triglycerides Starting now and then Every 3 Days, , , Every 3 Days (0300), Mary Alice L. Latona    lactated ringers infusion, , Intravenous, Continuous, Mary Alice L. Latona, Last Rate: 50 mL/hr at 10/05/22 0443, New Bag at 10/05/22 0443    acetaminophen (TYLENOL) suppository 975 mg, 975 mg, Rectal, Q6HRS PRN, Mary Alice L. Latona, 975 mg at 10/05/22 0437    MD Alert...ICU Electrolyte Replacement per Pharmacy, , Other, PHARMACY TO DOSE, Jennifer Pineda M.D.    famotidine (PEPCID) tablet 20 mg, 20 mg, Oral, Q12HRS **OR** famotidine (PEPCID) injection 20 mg, 20 mg, Intravenous, Q12HRS, Jennifer Pineda M.D., 20 mg at 10/05/22 1108    senna-docusate (PERICOLACE or SENOKOT S) 8.6-50 MG per tablet 2 Tablet, 2 Tablet, Oral, BID **AND** polyethylene glycol/lytes (MIRALAX) PACKET 1 Packet, 1 Packet, Oral, QDAY PRN **AND** magnesium hydroxide (MILK OF MAGNESIA) suspension 30 mL, 30 mL, Oral, QDAY PRN **AND** bisacodyl (DULCOLAX) suppository 10 mg, 10 mg, Rectal, QDAY PRN, Jennifer Pineda M.D.    enoxaparin (Lovenox) inj 40 mg, 40 mg, Subcutaneous, DAILY AT 1800, Jennifer Pineda M.D., 40 mg at 10/04/22 1817    cefTRIAXone (Rocephin) syringe 2 g, 2 g, Intravenous, BID, Jenniferfrances Pineda M.D., 2 g at 10/05/22 0535    doxycycline (VIBRAMYCIN) 100 mg in  mL IVPB, 100 mg, Intravenous, Q12HRS, Jennifer Pineda M.D., Stopped at 10/05/22 0649    fentaNYL (SUBLIMAZE) injection  mcg,  mcg, Intravenous, Q HOUR PRN,  "Jennifer Pineda M.D., 100 mcg at 10/05/22 0103    Most Recent Vital Signs:  BP (!) 94/52   Pulse 70   Resp 14   Ht 1.905 m (6' 3\")   Wt (!) 132 kg (291 lb 0.1 oz)   SpO2 98%   BMI 36.37 kg/m²       Physical Exam:  General: well nourished, no diaphoresis, shivering, unconscious on propofol  HEENT: sclera anicteric, PERRL; mucous membranes moist, oropharynx visible around tubing clear and moist, no oral lesions or exudate, red contusions on lips  Neck: supple, no lymphadenopathy  Chest: CTAB, no rales, rhonchi or wheezes  Cardiac: regular rate and rhythm, normal S1 S2, no murmurs, rubs or gallops  Abdomen: + bowel sounds, soft, non-distended, no hepatosplenomegaly  Extremities: WWP, no edema, 2+ pedal pulses.   Skin: warm and dry, Bilateral lower extremity erythematous papules sparing soles of feet extending to bilateral hips. No involvement of groin or trunk. Multiple benign cherry hemangiomas also present on trunk and face, distinct from present rash.   Neuro: unable to perform due to sedation; full passive ROM  Psych: unable to perform due to sedation    Pertinent Lab Results:  Recent Labs     10/04/22  1411 10/05/22  0630   WBC 12.2* 10.3      Recent Labs     10/04/22  1411 10/05/22  0630   HEMOGLOBIN 13.5* 11.8*   HEMATOCRIT 42.2 38.0*   MCV 81.3* 83.2   MCH 26.0* 25.8*   ANISOCYTOSIS  --  1+   PLATELETCT 221 220         Recent Labs     10/04/22  1411 10/05/22  0425   SODIUM 140 144   POTASSIUM 3.9 4.2   CHLORIDE 101 108   CO2 27 29   CREATININE 1.14 1.05        Recent Labs     10/04/22  1411 10/05/22  0425   ALBUMIN 3.1* 2.4*        Pertinent Micro:  Results       Procedure Component Value Units Date/Time    BLOOD CULTURE [[id number]] Collected: 10/04/22 1420    Order Status: Completed Specimen: Blood from Peripheral Updated: 10/05/22 0922     Significant Indicator NEG     Source BLD     Site PERIPHERAL     Culture Result No Growth  Note: Blood cultures are incubated for 5 days and  are monitored " "continuously.Positive blood cultures  are called to the RN and reported as soon as  they are identified.      Narrative:      Per Hospital Policy: Only change Specimen Src: to \"Line\" if  specified by physician order.  Left Wrist    BLOOD CULTURE [[id number]] Collected: 10/04/22 1422    Order Status: Completed Specimen: Blood from Peripheral Updated: 10/05/22 0922     Significant Indicator NEG     Source BLD     Site PERIPHERAL     Culture Result No Growth  Note: Blood cultures are incubated for 5 days and  are monitored continuously.Positive blood cultures  are called to the RN and reported as soon as  they are identified.      Narrative:      Per Hospital Policy: Only change Specimen Src: to \"Line\" if  specified by physician order.  No site indicated    Cryptococcal Antigen, Serum [[id number]] Collected: 10/04/22 1600    Order Status: Completed Updated: 10/04/22 1924     Cryptococcal Antigen, Serum Negative    GRAM STAIN [[id number]] Collected: 10/04/22 1414    Order Status: Completed Specimen: Respirate Updated: 10/04/22 1836     Significant Indicator .     Source RESP     Site Tracheal Aspirate     Gram Stain Result Few WBCs.  No organisms seen.      Narrative:      Collected By: [id number] YANCI MAY  Collected By: [id number] STEFFANIE FRANCO  Collected By: [id number] YANCI MAY    CULTURE RESPIRATORY W/ GRM STN [[id number]] Collected: 10/04/22 1414    Order Status: Sent Specimen: Respirate from Sputum Updated: 10/04/22 1836     Significant Indicator NEG     Source RESP     Site Tracheal Aspirate     Culture Result -     Gram Stain Result Few WBCs.  No organisms seen.      Narrative:      Collected By: [id number] YANCI MAY  Collected By: [id number] STEFFANIE FRANCO  Collected By: [id number] YANCI BALBUENA.    MRSA By PCR (Amp) [[id number]] Collected: 10/04/22 1605    Order Status: Completed Specimen: Respirate from Nares Updated: 10/04/22 1833     MRSA by PCR Negative    Narrative:      Collected By: [id number] " "YANCI MAY    Respiratory Panel By PCR [[id number]] Collected: 10/04/22 1432    Order Status: Completed Specimen: Respirate from Nasopharyngeal Updated: 10/04/22 1702     Adenovirus, PCR Not Detected     SARS-CoV-2 (COVID-19) RNA by ALLY NotDetected     Comment: PATIENTS: Important information regarding your results and instructions can  be found at [email or web address]   \"After your  Covid-19 Test\"    [facility] providers: PLEASE REFER TO DE-ESCALATION AND RETESTING PROTOCOL  on [email or web address]    **The [employer] Respiratory Panel 2.1 (RP2.1) RT-PCR test is FDA authorized.          Coronavirus 229E, PCR Not Detected     Coronavirus HKU1, PCR Not Detected     Coronavirus NL63, PCR Not Detected     Coronavirus OC43, PCR Not Detected     Human Metapneumovirus, PCR Not Detected     Rhino/Enterovirus, PCR Not Detected     Influenza A, PCR Not Detected     Influenza B, PCR Not Detected     Parainfluenza 1, PCR Not Detected     Parainfluenza 2, PCR Not Detected     Parainfluenza 3, PCR Not Detected     Parainfluenza 4, PCR Not Detected     RSV (Respiratory Syncytial Virus), PCR Not Detected     Bordetella parapertussis (NT2916), PCR Not Detected     Bordetella pertussis (ptxP), PCR Not Detected     Mycoplasma pneumoniae, PCR Not Detected     Chlamydia pneumoniae, PCR Not Detected    Narrative:      Collected By: [id number] YANCI MAY  Indication for culture:->Patient WITHOUT an indwelling Irene  catheter in place with new onset of Dysuria, Frequency,  Urgency, and/or Suprapubic pain  Collected By: [id number] YANCI MAY  UTILIZATION ALERT: Has Flu/RSV/COVID PCR testing been  performed, resulted reviewed, and consulted with Infectious  Diseases or PICU Provider Teams?->Yes  Have you been in close contact with a person who is suspected  or known to be positive for COVID-19 within the last 30 days  (e.g. last seen that person < 30 days ago)->Yes    URINE CULTURE(NEW) [[id number]] Collected: " "10/04/22 1415    Order Status: Sent Specimen: Urine, Irene Cath Updated: 10/04/22 1621    Narrative:      Collected By: [id number] YANCI MAY  Indication for culture:->Patient WITHOUT an indwelling Irene  catheter in place with new onset of Dysuria, Frequency,  Urgency, and/or Suprapubic pain  Collected By: [id number] YANCI MAY  UTILIZATION ALERT: Has Flu/RSV/COVID PCR testing been  performed, resulted reviewed, and consulted with Infectious  Diseases or PICU Provider Teams?->Yes  Have you been in close contact with a person who is suspected  or known to be positive for COVID-19 within the last 30 days  (e.g. last seen that person < 30 days ago)->Yes    COV-2, FLU A/B, AND RSV BY PCR (2-4 HOURS CEPHEID): Collect NP swab in VTM [[id number]] Collected: 10/04/22 1432    Order Status: Completed Specimen: Respirate from Nasopharyngeal Updated: 10/04/22 1609     Influenza virus A RNA Negative     Influenza virus B, PCR Negative     RSV, PCR Negative     SARS-CoV-2 by PCR NotDetected     Comment: PATIENTS: Important information regarding your results and instructions can  be found at [email or web address]   \"After your  Covid-19 Test\"    [facility] providers: PLEASE REFER TO DE-ESCALATION AND RETESTING PROTOCOL  on [email or web address]    **The [employer] Gene[employer]pert Xpress SARS-CoV-2 RT-PCR Test has been made  available for use under the Emergency Use Authorization (EUA) only.          SARS-CoV-2 Source NP Swab    Narrative:      Collected By: [id number] YANCI MAY    URINALYSIS [[id number]]  (Abnormal) Collected: 10/04/22 1415    Order Status: Completed Specimen: Urine, Cath Updated: 10/04/22 1525     Color Yellow     Character Turbid     Specific Gravity 1.025     Ph 6.0     Glucose Negative mg/dL      Ketones Negative mg/dL      Protein Negative mg/dL      Bilirubin Negative     Urobilinogen, Urine 0.2     Nitrite Negative     Leukocyte Esterase Small     Occult Blood Moderate     Micro Urine " Req Microscopic    Narrative:      Collected By: [id number] YANCI MAY  Collected By: [id number] STEFFANIE YULIANA FRANCO    Cryptococcal Antigen, Serum [[id number]]     Order Status: No result     FUNGAL BLOOD CULTURE [[id number]] Collected: 10/04/22 1420    Order Status: Sent Specimen: Blood     S. Aureus By PCR, Nasal Complete [[id number]]     Order Status: Canceled Specimen: Respirate           No results found for: BLOODCULTU, BLDCULT, BCHOLD     Studies:  CT-HEAD W/O    Result Date: 10/4/2022  10/4/2022 5:23 PM HISTORY/REASON FOR EXAM:  Seizure, new-onset, no history of trauma. TECHNIQUE/EXAM DESCRIPTION AND NUMBER OF VIEWS: CT of the head without contrast. The study was performed on a helical multidetector CT scanner. Contiguous 5 mm axial sections were obtained from the skull base through the vertex. Up to date radiation dose reduction adjustments have been utilized to meet ALARA standards for radiation dose reduction. COMPARISON:  None FINDINGS: CALVARIA:  The calvaria are normal in appearance. BRAIN:  The brain parenchyma is normal in appearance. VENTRICULAR SYSTEM: Ventricular system is normal in size and position. There is no hemorrhage or evidence for acute infarct. There are no extra-axial fluid collection. Sinuses:  The paranasal sinuses are clear. The mastoid air cells and middle ear are clear. The orbital contents are normal in appearance.     Negative noncontrast CT scan of the head / brain.     DX-CHEST-PORTABLE (1 VIEW)    Result Date: 10/4/2022  10/4/2022 2:02 PM HISTORY/REASON FOR EXAM:  Shortness of Breath. TECHNIQUE/EXAM DESCRIPTION AND NUMBER OF VIEWS: Single portable view of the chest. COMPARISON: None FINDINGS: Endotracheal tube terminates 2.1 cm of the matty. Right IJ catheter tip projects in the SVC. Cardiomediastinal silhouette is normal. Diffuse airspace and probable interstitial opacities bilaterally. Layering small pleural effusions are probably present. No pneumothorax. No acute osseous  abnormality.     1.  Satisfactory lines and tubes as detailed. 2.  Diffuse bilateral pulmonary opacities are nonspecific, may be related to extensive pulmonary edema or infection. 3.  There may be layering small pleural effusions. No pneumothorax.      IMPRESSION:   1. 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 male with past medical history significant for paroxysmal A. fib on Xarelto, obesity, ALEXANDER and no history of epilepsy admitted on 10/4/2022 from [facility] ER for dyspnea, rash, and history of tick bite.  Bilateral rash appears to be clearing former particular rash.  Given history of tick bite and epidemiology of rickettsial species, high suspicion at this moment for Dante Mountain spotted fever.  Rickettsial species have been previously associated with encephalopathy, seizure, and respiratory failure, which matches the patient's presentation.  Limited lab data available from home multiple ER to confirm this.  Rickettsia and other tickborne illnesses are obligate intracellular pathogens and require specialized cultures to properly propagate, which are not available at [facility].  Lab reports specialized intracellular stains for Rickettsia and other tickborne illnesses also not available.  RMSF antibody from CSF is pending, and Dr. Ruben Staples is working with referring provider Dr. Arsh Mattson to add additional Rickettsia, Borrelia, encephalitis and meningitis PCR panels.  Without definitive data, we will continue to need to cover empirically for common bacterial meningitis given history of neck stiffness and headaches.    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  Okay to discontinue ampicillin and vancomycin.  - Continue doxycycline for empiric treatment of tickborne illness  - Continue ceftriaxone for empiric treatment of bacterial  meningitis  - Consider using dermatoscope or other bedside microscopy to rule out retained head or mouth parts at site of tick bite on medial aspect of left ankle.  - Follow-up Rickettsia, Borrelia, encephalitis and meningitis PCR panels; lab to lab discussion happening for possible transfer of remaining CSF fluid to [facility].  -Consider repeat LP given the urgency of understanding the source of the patient's current critical illness.  - Consider CT chest for further characterization of bilateral pulmonary opacities seen on chest x-ray.    Plan of care discussed with Dr. Ruben Staples. Will continue to follow    Brian Levi M.D.    Please note that this dictation was created using voice recognition software. I have worked with technical experts from [facility] to optimize the interface.  I have made every reasonable attempt to correct obvious errors, but there may be errors of grammar and possibly content that I did not discover before finalizing the note.

## 2022-10-05 NOTE — PROGRESS NOTES
"[facility] ICU Progress Note      Admit Date: 10/4/2022    Resident(s): Ruben Staples M.D.   Attending:  MONIQUE CASH/ Dr. Pineda    Patient ID:    Name:  Josh Trivedi     YOB: 1965  Age:  57 y.o.  male   MRN:  [id number]    Hospital Course (carried forward and updated):  Josh Trivedi is a 57 y.o. male with PMH of obesity, ALEXANDER, and paroxysmal Afib on xarelto and afib who initially presented to [facility] on 10/2 for increasing dyspnea on exertion, fevers and chills, bloating, and headache. Patient presents as a direct admit to [facility] for ICU level care after witnessed seizure on 10/4/2022 and ID consult for concern of Lyme/RMSF/Meningitis. ID consulted.    Consultants:  Critical Care  Infectious Disease     Interval Events:  Overnight patient had Tmax of 102.2; previously had Tmax of 101 for which he was given ice packs and rectal tylenol. Overnight APRN gave cooling blanket; started to have shivering while on blanket, started on sedation lite protocol, prn demerol and propfol infusion. Additonally, CPK checked and was elevated >1100, started on mIVF.    Review of Systems   Unable to perform ROS: Intubated     PHYSICAL EXAM:  Vitals:    10/05/22 0400 10/05/22 0500 10/05/22 0600 10/05/22 0610   BP: (!) 96/54 (!) 90/53 (!) 99/59    Pulse: 78 72 86 84   Resp: 14 14 12 (!) 23   TempSrc: Bladder  Bladder    SpO2: 95% 96% 99% 99%   Weight:       Height:    1.905 m (6' 3\")    Body mass index is 35.16 kg/m².  Latest Vitals:  BP (!) 99/59   Pulse 84   Resp (!) 23   Ht 1.905 m (6' 3\")   Wt (!) 128 kg (281 lb 4.9 oz)   SpO2 99%   BMI 35.16 kg/m²   O2 therapy: Pulse Oximetry: 99 %, O2 Delivery Device: Ventilator  Vitals Range last 24h:  Pulse:  [] 84  Resp:  [12-28] 23  BP: ()/(50-79) 99/59  SpO2:  [89 %-99 %] 99 %       Intake/Output Summary (Last 24 hours) at 10/5/2022 0723  Last data filed at 10/5/2022 0619  Gross per 24 hour   Intake 2528.29 ml   Output 1270 ml   Net 1258.29 ml "        Physical Exam  Constitutional:       General: He is not in acute distress.     Appearance: He is not ill-appearing.   HENT:      Head: Normocephalic and atraumatic.      Nose: No rhinorrhea.      Mouth/Throat:      Mouth: Mucous membranes are dry.      Pharynx: Oropharynx is clear.   Eyes:      Conjunctiva/sclera: Conjunctivae normal.      Pupils: Pupils are equal, round, and reactive to light.   Cardiovascular:      Rate and Rhythm: Normal rate and regular rhythm.      Pulses: Normal pulses.      Heart sounds: Normal heart sounds.     No gallop.   Pulmonary:      Effort: Pulmonary effort is normal.      Breath sounds: Normal breath sounds. No wheezing or rales.   Abdominal:      General: There is no distension.   Skin:     General: Skin is warm.      Capillary Refill: Capillary refill takes less than 2 seconds.   Neurological:      Motor: No weakness.      Comments: Unalbe to access; patient intubated and sedated   Psychiatric:      Comments: Unable to assess; patient intubated and sedated       Recent Labs     10/04/22  1604 10/05/22  0434   ISTATAPH 7.533* 7.433   ISTATAPCO2 35.7 44.3*   ISTATAPO2 71 77   ISTATATCO2 31 31   [id number] 96 96   ISTATARTHCO3 30.0* 29.6*   ISTATARTBE 7* 5*   ISTATTEMP 99.7 F 102.0 F   ISTATFIO2 50 50   ISTATSPEC Arterial Arterial   ISTATAPHTC 7.524* 7.405   [id number] 74 88*     Recent Labs     10/04/22  1411 10/05/22  0425   SODIUM 140 144   POTASSIUM 3.9 4.2   CHLORIDE 101 108   CO2 27 29   BUN 37* 39*   CREATININE 1.14 1.05   MAGNESIUM 2.8* 2.8*   PHOSPHORUS 2.7 2.6   CALCIUM 7.8* 7.4*     Recent Labs     10/04/22  1411 10/05/22  0425   ALTSGPT 82* 68*   ASTSGOT 49* 52*   ALKPHOSPHAT 66 58   TBILIRUBIN 0.3 0.3   GLUCOSE 181* 168*     Recent Labs     10/04/22  1411 10/05/22  0630   RBC 5.19 4.57*   HEMOGLOBIN 13.5* 11.8*   HEMATOCRIT 42.2 38.0*   PLATELETCT 221 220     Recent Labs     10/04/22  1411 10/05/22  0425 10/05/22  0630   WBC 12.2*  --  10.3   NEUTSPOLYS 39.80*   --   --    LYMPHOCYTES 44.10*  --   --    MONOCYTES 15.30*  --   --    EOSINOPHILS 0.00  --   --    BASOPHILS 0.80  --   --    ASTSGOT 49* 52*  --    ALTSGPT 82* 68*  --    ALKPHOSPHAT 66 58  --    TBILIRUBIN 0.3 0.3  --        Meds:   fentaNYL  50 mcg      And    fentaNYL  100 mcg      And    fentaNYL   200 mcg/hr (10/05/22 0505)    And    dexmedetomidine (Precedex) infusion  0-0.7 mcg/kg/hr Stopped (10/05/22 0444)    propofol  0-80 mcg/kg/min 60 mcg/kg/min (10/05/22 0702)    LR   50 mL/hr at 10/05/22 0443    acetaminophen  975 mg      senna-docusate  2 Tablet      And    polyethylene glycol/lytes  1 Packet      And    magnesium hydroxide  30 mL      And    bisacodyl  10 mg      enoxaparin (LOVENOX) injection  40 mg      cefTRIAXone (ROCEPHIN) IV  2 g      MD Alert...Vancomycin per Pharmacy        ampicillin  2,000 mg Stopped (10/05/22 0619)    doxycycline  100 mg Stopped (10/05/22 0649)    vancomycin  1,750 mg Stopped (10/05/22 0304)    fentaNYL   mcg          Procedures:  none    Imaging:  CT-HEAD W/O   Final Result      Negative noncontrast CT scan of the head / brain.         DX-CHEST-PORTABLE (1 VIEW)   Final Result      1.  Satisfactory lines and tubes as detailed.   2.  Diffuse bilateral pulmonary opacities are nonspecific, may be related to extensive pulmonary edema or infection.   3.  There may be layering small pleural effusions. No pneumothorax.          Problem and Plan:    * Seizure (HCC)- (present on admission)  Assessment & Plan  One episode of witnessed tonic-clonic seizure at [facility] on 10/4; prompted intubation and sedation. Unclear etiology, but given positive UDS and possible tickborne illness, both may have contributed to seizure like activity.  -continue intubation/sedation  -consider ativan pushes or midazolam drip for break through seizure  -consider EEG  -goal SpO2 >90%  -repeat infectious work up (HIV, Covid/Flu, Fungal culture, MRSA, UA)  -follow up CT head - no acute  findings    Tick bite of left lower leg  Assessment & Plan  Initially presented to OSH as A&O x4, over course of hospitalization, patient became delirious, A&O x0; one episode of witnessed seizure on 10/4, now intubated and sedated. History of tick bite approximately 2 weeks ago with accompanying petechial rash and headaches/subjective fevers since then. Currently awaiting further LP studies and lyme serology.  -telemetry  -ID consulted, appreciate recs  -continue ampicillin, ceftriaxone, doxycycline and vancomycin to cover for Lyme/RMSF/meningitis  -Further infectious cause work up (HIV, Covid/Flu, Fungal cultures, MRSA, UA)      Acute respiratory failure with hypoxia (HCC)  Assessment & Plan  Patient initially requiring nasal cannula at 2-3L, then increased to HFNC, to BIPAP and then to being intubated and sedated and transferred to [facility]. Chest XR demonstrates. Previous work up for CHF negative at OSH.  -continue intubation/sedation  -Goal SpO2 > 90%  -RT/O2 protocol  -ABCDEF bundle  -SAT/SBT as tolerated  -diuresis as appropriate  -repeat echo    Paroxysmal A-fib (HCC)  Assessment & Plan  History of paroxysmal afib on metoprolol and xarelto.  -telemetry  -restart metoprolol as clinically appropriate  -hold xarelto, potentially may need repeat LP  -DVT prophylaxis      DISPO: ICU    CODE STATUS: Full    Quality Measures:  Feeding: NPO  Analgesia: fentanyl, tylenol  Sedation: propofol, dexmedetomidine  Thromboprophylaxis: lovenox  Head of bed: >30 degrees  Ulcer prophylaxis: none  Glycemic control: none  Bowel care: bowel regimen  Indwelling lines: pIV, RIJ, barrera, ET tube  Deescalation of antibiotics: on ampicillin, vancomycin, ceftriaxone, and doxycycline      Ruben Staples M.D.

## 2022-10-05 NOTE — PROGRESS NOTES
I was notified by the bedside nurse that the patient was febrile w/ T max of 102.2 in spite of tylenol and ice packs. Patient was ordered to be on a cooling blanket to maintain normothermia. Later, the nurse notified me that the patient was shivering while on the cooling blanket; seizures were not suspected due to no roving eyes or nystagmus during his shivering, purposeful withdraw to pain during the shivering, and it was more in his masseters and bilateral lower extremities w/ goosebumps to arms. He is intubated and was on a precedex gtt and was receiving frequent fentanyl pushes due to agitation so I added the sedation lite protocol to help with agitation and shivering. Finally, the patient was noted to have continued shivering so, in a last attempt to avoid arctic sun for targeted temperature management for goal of normothermia, I added PRN demerol and propofol infusion. I also instructed the nurse to call to have the room thermostat adjusted. Due to continued shivering, I checked a CK level which was >1100 so I added low rate maintenance fluids. If this is unsuccessful in achieving normothermia, the next step to be considered would be arctic sun.     Patient was critically ill during the time I treated the patient.    15 minutes of critical care time were spent, including examination and interview of the patient, explanation of prognosis/diagnosis/plan of care, direction of nursing staff and discussion of the patient with other physicians involved.  This time was independent of procedures performed.    Greater than 50% of which was spent at the bedside.

## 2022-10-05 NOTE — PROGRESS NOTES
1330: Pt arrived via care flight  team. MD Paul and Edwin at bedside. Versed and fentanyl gtts stopped per Md. VSS. Orders reviewed labs drawn.     1600: Family at bedside given update from RN and Dr. Pineda    1734: Pt taken down to CT on monitor with ACLS RN, CCT, and RT.

## 2022-10-05 NOTE — CARE PLAN
The patient is Watcher - Medium risk of patient condition declining or worsening    Shift Goals  Clinical Goals: Minimize Shivering  Patient Goals: VSS  Family Goals: Support    Progress made toward(s) clinical / shift goals:    Problem: Knowledge Deficit - Standard  Goal: Patient and family/care givers will demonstrate understanding of plan of care, disease process/condition, diagnostic tests and medications  Outcome: Progressing     Problem: Pain - Standard  Goal: Alleviation of pain or a reduction in pain to the patient’s comfort goal  Outcome: Progressing     Problem: Skin Integrity  Goal: Skin integrity is maintained or improved  Outcome: Progressing     Problem: Fall Risk  Goal: Patient will remain free from falls  Outcome: Progressing     Problem: Safety - Medical Restraint  Goal: Remains free of injury from restraints (Restraint for Interference with Medical Device)  Outcome: Progressing  Goal: Free from restraint(s) (Restraint for Interference with Medical Device)  Outcome: Progressing       Patient is not progressing towards the following goals:

## 2022-10-05 NOTE — PROGRESS NOTES
Critical Care Progress Note    Date of admission  10/4/2022    Chief Complaint  57 y.o. male with history of afib on AC who presented to [facility] on 10/2/22 with fevers, dyspnea, headache, tachycardia, abdominal bloating/distention, anorexia, and petechial rash x2 weeks. This all started apparently after a tick bite on the medial aspect of the LLE in [place]. He initially went to urgent care in [facility] where he was diagnosed with prostatitis and given antibiotics which did not improve his symptoms.  He then presented to [facility] with the above symptoms and was discharged after apparently unremarkable exam.  He presented to the ED again on 10/2/2022 with worsening symptoms.   He was noted to have normal trop, BNP and had mild elevated in transaminases.  Chest x-ray was concerning for congestive heart failure. He as given bronchodilators, ceftriaxone and placed on bipap. He was started on CTX, vancomycin, ampicillin, doxycycline because he initially refused lumbar puncture. he was noted to become progressively more confused on 10/3/2022 so he got MRI (unremarkable) and LP. TTE was unremarkable.  Acyclovir was added after LP was done.       Interval Problem Update  Chart review from the past 24 hours includes imaging, laboratory studies, vital signs and notes available.  Pertinent data for today's visit includes    Cardiac: SBP 90s  Pulm: 500/8/50%  Heme: stable  /renal: Cr improving w/IVFs  GI/endo: BG <180  ID: UA turbid w/WBC and LE - pending culture. Pcal 0.31  Resp PCR neg, HIV neg  ESR 15, CRP 3.28  I/O: +600  Additional Labs/Imaging: LFTs improving, elevated CPK  Additional information:  - precedex, propofol, prn fentanyl w/nightly fevers.      Review of Systems  Review of Systems   Unable to perform ROS: Medical condition      Vital Signs for last 24 hours   Pulse:  [] 73  Resp:  [9-28] 19  BP: ()/(50-79) 92/53  SpO2:  [89 %-100 %] 96 %    Hemodynamic  parameters for last 24 hours       Respiratory Information for the last 24 hours  Vent Mode: APVCMV  Rate (breaths/min): 14  Vt Target (mL): 500  PEEP/CPAP: 8  MAP: 10  Control VTE (exp VT): 782    Physical Exam   Physical Exam  Vitals and nursing note reviewed.   Constitutional:       General: He is not in acute distress.     Comments: Intubated, sedated   HENT:      Head: Normocephalic.      Nose: Nose normal.      Mouth/Throat:      Mouth: Mucous membranes are moist.   Eyes:      General: No scleral icterus.     Conjunctiva/sclera: Conjunctivae normal.   Cardiovascular:      Rate and Rhythm: Normal rate and regular rhythm.   Pulmonary:      Effort: Pulmonary effort is normal. No respiratory distress.      Breath sounds: No wheezing.   Abdominal:      Palpations: Abdomen is soft.   Skin:     Comments: Fine petechial rash on BLEs. One small ~2mm scab on LLE. Small circular faintly red rash about 2-3cm in diameter with central clearing on posterior RLE above ankle    Neurological:      Comments: Moving all extremities when sedation was decreased and very agitated but not following commands       Medications  Current Facility-Administered Medications   Medication Dose Route Frequency Provider Last Rate Last Admin    fentaNYL (SUBLIMAZE) injection 50 mcg  50 mcg Intravenous Q15 MIN PRN Mary Alice L. Latona        And    fentaNYL (SUBLIMAZE) injection 100 mcg  100 mcg Intravenous Q15 MIN PRN Mary Alice L. Latona   100 mcg at 10/05/22 0344    And    fentaNYL (SUBLIMAZE) 50 mcg/mL in 50mL (Continuous Infusion)   Intravenous Continuous Mary Alice L. Latona 2 mL/hr at 10/05/22 0936 100 mcg/hr at 10/05/22 0936    And    dexmedetomidine (PRECEDEX) 400 mcg/100mL NS premix infusion  0-0.7 mcg/kg/hr Intravenous Continuous Mary Alice L. Latona   Stopped at 10/05/22 0444    propofol (DIPRIVAN) injection  0-80 mcg/kg/min Intravenous Continuous Mary Alice L. Latona 30.7 mL/hr at 10/05/22 1015 40 mcg/kg/min at 10/05/22 1015    lactated ringers infusion    Intravenous Continuous Mary Alice L. Latona 50 mL/hr at 10/05/22 0443 New Bag at 10/05/22 0443    acetaminophen (TYLENOL) suppository 975 mg  975 mg Rectal Q6HRS PRN Mary Alice L. Latona   975 mg at 10/05/22 0437    MD Alert...ICU Electrolyte Replacement per Pharmacy   Other PHARMACY TO DOSE Jennifer Pineda M.D.        famotidine (PEPCID) tablet 20 mg  20 mg Oral Q12HRS Jennifer Pineda M.D.        Or    famotidine (PEPCID) injection 20 mg  20 mg Intravenous Q12HRS Jennifer Pineda M.D.   20 mg at 10/05/22 1108    senna-docusate (PERICOLACE or SENOKOT S) 8.6-50 MG per tablet 2 Tablet  2 Tablet Oral BID Jennifer Pineda M.D.        And    polyethylene glycol/lytes (MIRALAX) PACKET 1 Packet  1 Packet Oral QDAY PRN Jennifer Pineda M.D.        And    magnesium hydroxide (MILK OF MAGNESIA) suspension 30 mL  30 mL Oral QDAY PRN Jennifer Pineda M.D.        And    bisacodyl (DULCOLAX) suppository 10 mg  10 mg Rectal QDAY PRN Jennifer Pineda M.D.        enoxaparin (Lovenox) inj 40 mg  40 mg Subcutaneous DAILY AT 1800 Jennifer Pineda M.D.   40 mg at 10/04/22 1817    cefTRIAXone (Rocephin) syringe 2 g  2 g Intravenous BID Jennifer Pineda M.D.   2 g at 10/05/22 0535    MD Alert...Vancomycin per Pharmacy   Other PHARMACY TO DOSE Jennifer Pineda M.D.        ampicillin (Omnipen) 2,000 mg in  mL IVPB  2,000 mg Intravenous Q4HRS Jennifer Pineda M.D.   Stopped at 10/05/22 1010    doxycycline (VIBRAMYCIN) 100 mg in  mL IVPB  100 mg Intravenous Q12HRS Jennifer Pineda M.D.   Stopped at 10/05/22 0649    vancomycin (VANCOCIN) 1,750 mg in  mL IVPB  1,750 mg Intravenous Q8HR Jennifer Edwin, M.D. 250 mL/hr at 10/05/22 0938 1,750 mg at 10/05/22 0938    fentaNYL (SUBLIMAZE) injection  mcg   mcg Intravenous Q HOUR PRN Jennifer Edwin, MBROWN   100 mcg at 10/05/22 0103       Fluids    Intake/Output Summary (Last 24 hours) at 10/5/2022 1118  Last data filed at 10/5/2022  1100  Gross per 24 hour   Intake 4069.64 ml   Output 1540 ml   Net 2529.64 ml       Laboratory  Recent Labs     10/04/22  1604 10/05/22  0434   ISTATAPH 7.533* 7.433   ISTATAPCO2 35.7 44.3*   ISTATAPO2 71 77   ISTATATCO2 31 31   [id number] 96 96   ISTATARTHCO3 30.0* 29.6*   ISTATARTBE 7* 5*   ISTATTEMP 99.7 F 102.0 F   ISTATFIO2 50 50   ISTATSPEC Arterial Arterial   ISTATAPHTC 7.524* 7.405   [id number] 74 88*     Recent Labs     10/05/22  0425 10/05/22  0945   CPKTOTAL 1127* 1370*     Recent Labs     10/04/22  1411 10/05/22  0425   SODIUM 140 144   POTASSIUM 3.9 4.2   CHLORIDE 101 108   CO2 27 29   BUN 37* 39*   CREATININE 1.14 1.05   MAGNESIUM 2.8* 2.8*   PHOSPHORUS 2.7 2.6   CALCIUM 7.8* 7.4*     Recent Labs     10/04/22  1411 10/05/22  0425   ALTSGPT 82* 68*   ASTSGOT 49* 52*   ALKPHOSPHAT 66 58   TBILIRUBIN 0.3 0.3   GLUCOSE 181* 168*     Recent Labs     10/04/22  1411 10/05/22  0425 10/05/22  0630   WBC 12.2*  --  10.3   NEUTSPOLYS 39.80*  --  43.00*   LYMPHOCYTES 44.10*  --  43.90*   MONOCYTES 15.30*  --  9.60   EOSINOPHILS 0.00  --  0.00   BASOPHILS 0.80  --  0.00   ASTSGOT 49* 52*  --    ALTSGPT 82* 68*  --    ALKPHOSPHAT 66 58  --    TBILIRUBIN 0.3 0.3  --      Recent Labs     10/04/22  1411 10/05/22  0630   RBC 5.19 4.57*   HEMOGLOBIN 13.5* 11.8*   HEMATOCRIT 42.2 38.0*   PLATELETCT 221 220       Imaging  Reviewed    Assessment/Plan   Seizures  Altered mental status  Unclear etiology as witnessed tonic-clonic seizure at OSH -concern for meningitis/encephalitis.  He was noted to be progressively more confused during his hospital stay at OSH. CT head at OSH on 10/2 was unremarkable but this was prior to seizure.  CT head 10/4 negative.   -Wean sedation to assess mental status  -Ativan as needed seizures  -Infectious work-up as noted below  -We will get LP studies from outside hospital  -Antibiotics as noted below     Sepsis  Petechial rash  Transaminitis  Unclear etiology of patient's petechial rash with  the symptoms fever, chills, abdominal pain/bloating and headache -concerning for meningitis as noted above.  He did have a tick bite on left lower extremity which is concerning for tickborne diseases. HIV negative.  So far, LP at OSH with elevated protein of 57, normal glucose of 78-remaining studies pending  -We will get LP studies, blood and sputum culture results from outside hospital  -Repeat blood cultures, respiratory culture, UA and urine culture, respiratory viral panel  -Follow-up Fungitell, galactomannan, cocci, crypto, Lyme PCR and antibodies, Rickettsia antibodies  -Continue vancomycin, ceftriaxone and ampicillin for meningitis coverage  -Continue doxycycline for atypical coverage  -Appreciate ID recs -no need for acyclovir at this time  -Follow-up ANCA  -Follow-up urine strep and Legionella     Acute respiratory failure  Patient was noted to have bilateral pulmonary edema at outside hospital with normal TTE and BNP.  He was initially on BiPAP and high flow nasal cannula and then intubated after seizures.   -Lung protective ventilation -wean as tolerated  -Daily SAT/SBT  -Antibiotics as noted above    VTE:  Lovenox  Ulcer: PPI  Lines:  reviewed     I have performed a physical exam and reviewed and updated ROS and Plan today (10/5/2022). In review of yesterday's note (10/4/2022), there are no changes except as documented above.     Discussed patient condition and risk of morbidity and/or mortality with Family, RN, RT, and Pharmacy  The patient remains critically ill.  Critical care time = 45 minutes in directly providing and coordinating critical care and extensive data review.  No time overlap and excludes procedures.        Jennifer Pineda MD  Pulmonary and Critical Care Medicine  Critical access hospital

## 2022-10-06 ENCOUNTER — APPOINTMENT (OUTPATIENT)
Dept: CARDIOLOGY | Facility: MEDICAL CENTER | Age: 57
DRG: 871 | End: 2022-10-06
Attending: STUDENT IN AN ORGANIZED HEALTH CARE EDUCATION/TRAINING PROGRAM
Payer: COMMERCIAL

## 2022-10-06 ENCOUNTER — APPOINTMENT (OUTPATIENT)
Dept: RADIOLOGY | Facility: MEDICAL CENTER | Age: 57
DRG: 871 | End: 2022-10-06
Attending: STUDENT IN AN ORGANIZED HEALTH CARE EDUCATION/TRAINING PROGRAM
Payer: COMMERCIAL

## 2022-10-06 LAB
1 3 BETA D GLUCAN INTERP Q4483: ABNORMAL
1,3 BETA GLUCAN SER-MCNC: 69 PG/ML
ALBUMIN SERPL BCP-MCNC: 2.3 G/DL (ref 3.2–4.9)
ALBUMIN/GLOB SERPL: 0.9 G/DL
ALP SERPL-CCNC: 48 U/L (ref 30–99)
ALT SERPL-CCNC: 76 U/L (ref 2–50)
ANION GAP SERPL CALC-SCNC: 9 MMOL/L (ref 7–16)
AST SERPL-CCNC: 79 U/L (ref 12–45)
BACTERIA SPEC RESP CULT: NORMAL
BACTERIA UR CULT: NORMAL
BASOPHILS # BLD AUTO: 0.4 % (ref 0–1.8)
BASOPHILS # BLD: 0.03 K/UL (ref 0–0.12)
BILIRUB SERPL-MCNC: 0.4 MG/DL (ref 0.1–1.5)
BUN SERPL-MCNC: 29 MG/DL (ref 8–22)
CALCIUM SERPL-MCNC: 7.3 MG/DL (ref 8.5–10.5)
CHLORIDE SERPL-SCNC: 108 MMOL/L (ref 96–112)
CO2 SERPL-SCNC: 25 MMOL/L (ref 20–33)
CREAT SERPL-MCNC: 0.69 MG/DL (ref 0.5–1.4)
CRP SERPL HS-MCNC: 4.06 MG/DL (ref 0–0.75)
EKG IMPRESSION: NORMAL
EOSINOPHIL # BLD AUTO: 0.27 K/UL (ref 0–0.51)
EOSINOPHIL NFR BLD: 3.5 % (ref 0–6.9)
ERYTHROCYTE [DISTWIDTH] IN BLOOD BY AUTOMATED COUNT: 43.8 FL (ref 35.9–50)
GALACTOMANNAN AG SERPL QL IA: NEGATIVE
GALACTOMANNAN AG SERPL-ACNC: 0.06
GFR SERPLBLD CREATININE-BSD FMLA CKD-EPI: 109 ML/MIN/1.73 M 2
GLOBULIN SER CALC-MCNC: 2.6 G/DL (ref 1.9–3.5)
GLUCOSE SERPL-MCNC: 133 MG/DL (ref 65–99)
GRAM STN SPEC: NORMAL
HCT VFR BLD AUTO: 33.3 % (ref 42–52)
HGB BLD-MCNC: 10.6 G/DL (ref 14–18)
IMM GRANULOCYTES # BLD AUTO: 0.1 K/UL (ref 0–0.11)
IMM GRANULOCYTES NFR BLD AUTO: 1.3 % (ref 0–0.9)
INR PPP: 1.14 (ref 0.87–1.13)
L PNEUMO AG UR QL IA: NEGATIVE
LYMPHOCYTES # BLD AUTO: 2.86 K/UL (ref 1–4.8)
LYMPHOCYTES NFR BLD: 36.6 % (ref 22–41)
MAGNESIUM SERPL-MCNC: 2.6 MG/DL (ref 1.5–2.5)
MCH RBC QN AUTO: 26.1 PG (ref 27–33)
MCHC RBC AUTO-ENTMCNC: 31.8 G/DL (ref 33.7–35.3)
MCV RBC AUTO: 82 FL (ref 81.4–97.8)
MONOCYTES # BLD AUTO: 0.96 K/UL (ref 0–0.85)
MONOCYTES NFR BLD AUTO: 12.3 % (ref 0–13.4)
NEUTROPHILS # BLD AUTO: 3.6 K/UL (ref 1.82–7.42)
NEUTROPHILS NFR BLD: 45.9 % (ref 44–72)
NRBC # BLD AUTO: 0 K/UL
NRBC BLD-RTO: 0 /100 WBC
PHOSPHATE SERPL-MCNC: 1.9 MG/DL (ref 2.5–4.5)
PLATELET # BLD AUTO: 223 K/UL (ref 164–446)
PMV BLD AUTO: 9.6 FL (ref 9–12.9)
POTASSIUM SERPL-SCNC: 3 MMOL/L (ref 3.6–5.5)
PREALB SERPL-MCNC: 11.6 MG/DL (ref 18–38)
PROT SERPL-MCNC: 4.9 G/DL (ref 6–8.2)
PROTHROMBIN TIME: 14.5 SEC (ref 12–14.6)
RBC # BLD AUTO: 4.06 M/UL (ref 4.7–6.1)
S PNEUM AG UR QL: NEGATIVE
SIGNIFICANT IND 70042: NORMAL
SIGNIFICANT IND 70042: NORMAL
SITE SITE: NORMAL
SITE SITE: NORMAL
SODIUM SERPL-SCNC: 142 MMOL/L (ref 135–145)
SOURCE SOURCE: NORMAL
SOURCE SOURCE: NORMAL
WBC # BLD AUTO: 7.8 K/UL (ref 4.8–10.8)

## 2022-10-06 PROCEDURE — 770022 HCHG ROOM/CARE - ICU (200)

## 2022-10-06 PROCEDURE — 93010 ELECTROCARDIOGRAM REPORT: CPT | Performed by: INTERNAL MEDICINE

## 2022-10-06 PROCEDURE — 009U3ZX DRAINAGE OF SPINAL CANAL, PERCUTANEOUS APPROACH, DIAGNOSTIC: ICD-10-PCS | Performed by: STUDENT IN AN ORGANIZED HEALTH CARE EDUCATION/TRAINING PROGRAM

## 2022-10-06 PROCEDURE — 94799 UNLISTED PULMONARY SVC/PX: CPT

## 2022-10-06 PROCEDURE — 700111 HCHG RX REV CODE 636 W/ 250 OVERRIDE (IP): Performed by: STUDENT IN AN ORGANIZED HEALTH CARE EDUCATION/TRAINING PROGRAM

## 2022-10-06 PROCEDURE — 84100 ASSAY OF PHOSPHORUS: CPT

## 2022-10-06 PROCEDURE — 94003 VENT MGMT INPAT SUBQ DAY: CPT

## 2022-10-06 PROCEDURE — 83735 ASSAY OF MAGNESIUM: CPT

## 2022-10-06 PROCEDURE — 62270 DX LMBR SPI PNXR: CPT | Performed by: STUDENT IN AN ORGANIZED HEALTH CARE EDUCATION/TRAINING PROGRAM

## 2022-10-06 PROCEDURE — 85610 PROTHROMBIN TIME: CPT

## 2022-10-06 PROCEDURE — 700105 HCHG RX REV CODE 258: Performed by: NURSE PRACTITIONER

## 2022-10-06 PROCEDURE — A9270 NON-COVERED ITEM OR SERVICE: HCPCS | Performed by: STUDENT IN AN ORGANIZED HEALTH CARE EDUCATION/TRAINING PROGRAM

## 2022-10-06 PROCEDURE — 700102 HCHG RX REV CODE 250 W/ 637 OVERRIDE(OP): Performed by: STUDENT IN AN ORGANIZED HEALTH CARE EDUCATION/TRAINING PROGRAM

## 2022-10-06 PROCEDURE — 84134 ASSAY OF PREALBUMIN: CPT

## 2022-10-06 PROCEDURE — 86140 C-REACTIVE PROTEIN: CPT

## 2022-10-06 PROCEDURE — 85025 COMPLETE CBC W/AUTO DIFF WBC: CPT

## 2022-10-06 PROCEDURE — 99233 SBSQ HOSP IP/OBS HIGH 50: CPT | Mod: GC | Performed by: INTERNAL MEDICINE

## 2022-10-06 PROCEDURE — 700111 HCHG RX REV CODE 636 W/ 250 OVERRIDE (IP): Performed by: NURSE PRACTITIONER

## 2022-10-06 PROCEDURE — 700105 HCHG RX REV CODE 258: Performed by: STUDENT IN AN ORGANIZED HEALTH CARE EDUCATION/TRAINING PROGRAM

## 2022-10-06 PROCEDURE — 700101 HCHG RX REV CODE 250: Performed by: STUDENT IN AN ORGANIZED HEALTH CARE EDUCATION/TRAINING PROGRAM

## 2022-10-06 PROCEDURE — 62270 DX LMBR SPI PNXR: CPT

## 2022-10-06 PROCEDURE — 99291 CRITICAL CARE FIRST HOUR: CPT | Mod: 25 | Performed by: STUDENT IN AN ORGANIZED HEALTH CARE EDUCATION/TRAINING PROGRAM

## 2022-10-06 PROCEDURE — 80053 COMPREHEN METABOLIC PANEL: CPT

## 2022-10-06 RX ORDER — DOXYCYCLINE 100 MG/1
100 TABLET ORAL EVERY 12 HOURS
Status: DISCONTINUED | OUTPATIENT
Start: 2022-10-06 | End: 2022-10-07

## 2022-10-06 RX ORDER — FUROSEMIDE 10 MG/ML
40 INJECTION INTRAMUSCULAR; INTRAVENOUS ONCE
Status: COMPLETED | OUTPATIENT
Start: 2022-10-06 | End: 2022-10-06

## 2022-10-06 RX ORDER — ACETAMINOPHEN 325 MG/1
650 TABLET ORAL EVERY 4 HOURS PRN
Status: DISCONTINUED | OUTPATIENT
Start: 2022-10-06 | End: 2022-10-07

## 2022-10-06 RX ADMIN — PROPOFOL 50 MCG/KG/MIN: 10 INJECTION, EMULSION INTRAVENOUS at 06:42

## 2022-10-06 RX ADMIN — CEFTRIAXONE SODIUM 2 G: 10 INJECTION, POWDER, FOR SOLUTION INTRAVENOUS at 05:47

## 2022-10-06 RX ADMIN — DOXYCYCLINE 100 MG: 100 TABLET, FILM COATED ORAL at 17:49

## 2022-10-06 RX ADMIN — PROPOFOL 30 MCG/KG/MIN: 10 INJECTION, EMULSION INTRAVENOUS at 19:20

## 2022-10-06 RX ADMIN — POTASSIUM PHOSPHATE, MONOBASIC AND POTASSIUM PHOSPHATE, DIBASIC 30 MMOL: 224; 236 INJECTION, SOLUTION, CONCENTRATE INTRAVENOUS at 07:43

## 2022-10-06 RX ADMIN — PROPOFOL 60 MCG/KG/MIN: 10 INJECTION, EMULSION INTRAVENOUS at 12:41

## 2022-10-06 RX ADMIN — SODIUM CHLORIDE, POTASSIUM CHLORIDE, SODIUM LACTATE AND CALCIUM CHLORIDE: 600; 310; 30; 20 INJECTION, SOLUTION INTRAVENOUS at 11:25

## 2022-10-06 RX ADMIN — PROPOFOL 30 MCG/KG/MIN: 10 INJECTION, EMULSION INTRAVENOUS at 15:04

## 2022-10-06 RX ADMIN — CEFTRIAXONE SODIUM 2 G: 10 INJECTION, POWDER, FOR SOLUTION INTRAVENOUS at 17:49

## 2022-10-06 RX ADMIN — PROPOFOL 50 MCG/KG/MIN: 10 INJECTION, EMULSION INTRAVENOUS at 01:29

## 2022-10-06 RX ADMIN — FAMOTIDINE 20 MG: 20 TABLET, FILM COATED ORAL at 17:49

## 2022-10-06 RX ADMIN — FUROSEMIDE 40 MG: 10 INJECTION, SOLUTION INTRAMUSCULAR; INTRAVENOUS at 11:25

## 2022-10-06 RX ADMIN — ACETAMINOPHEN 650 MG: 325 TABLET, FILM COATED ORAL at 17:49

## 2022-10-06 RX ADMIN — Medication 200 MCG/HR: at 14:03

## 2022-10-06 RX ADMIN — FAMOTIDINE 20 MG: 10 INJECTION, SOLUTION INTRAVENOUS at 05:47

## 2022-10-06 RX ADMIN — PROPOFOL 50 MCG/KG/MIN: 10 INJECTION, EMULSION INTRAVENOUS at 03:59

## 2022-10-06 RX ADMIN — Medication 200 MCG: at 01:29

## 2022-10-06 RX ADMIN — DOCUSATE SODIUM 50 MG AND SENNOSIDES 8.6 MG 2 TABLET: 8.6; 5 TABLET, FILM COATED ORAL at 05:47

## 2022-10-06 RX ADMIN — DOXYCYCLINE 100 MG: 100 INJECTION, POWDER, LYOPHILIZED, FOR SOLUTION INTRAVENOUS at 05:50

## 2022-10-06 RX ADMIN — PROPOFOL 30 MCG/KG/MIN: 10 INJECTION, EMULSION INTRAVENOUS at 09:59

## 2022-10-06 RX ADMIN — ACETAMINOPHEN 650 MG: 325 TABLET, FILM COATED ORAL at 22:01

## 2022-10-06 RX ADMIN — DOCUSATE SODIUM 50 MG AND SENNOSIDES 8.6 MG 2 TABLET: 8.6; 5 TABLET, FILM COATED ORAL at 17:49

## 2022-10-06 ASSESSMENT — FIBROSIS 4 INDEX: FIB4 SCORE: 1.576481562736164328

## 2022-10-06 NOTE — DISCHARGE PLANNING
Case Management Discharge Planning    Admission Date: 10/4/2022  GMLOS: 4.3  ALOS: 2    6-Clicks ADL Score: 6  6-Clicks Mobility Score: 6  PT and/or OT Eval ordered: No  Post-acute Referrals Ordered: No  Post-acute Choice Obtained: No  Has referral(s) been sent to post-acute provider:  No    Anticipated Discharge Dispo: Discharge Disposition: Disch to IP rehab facility or distinct part unit (62)    DME Needed: No    Action(s) Taken: DC Assessment Complete (See below)    Escalations Completed: None    Medically Clear: No    Next Steps: Chart review and assessment completed from information obtained. Patient was BIB EMS from  [facility],  PT live in [address] [facility] with  spouse and adult children .  Spouse is involved with care. Pt on vent , plan to SBT and wean sedation, rec IV abx , plan to repeat LP and possible extubate,  Anticipate IPR at discharge       Barriers to Discharge: Medical clearance, Pending Placement, and Pending PT Evaluation    Is the patient up for discharge tomorrow: No      Care Transition Team Assessment    Information Source  Orientation Level: Unable to assess  Information Given By:  (EMR)    Readmission Evaluation  Is this a readmission?: No    Elopement Risk  Legal Hold: No  Ambulatory or Self Mobile in Wheelchair: No-Not an Elopement Risk  Elopement Risk: Not at Risk for Elopement    Interdisciplinary Discharge Planning  Lives with - Patient's Self Care Capacity: Adult Children, Significant Other  Patient or legal guardian wants to designate a caregiver: No  Support Systems: Friends / Neighbors, Parent, Family Member(s)  Housing / Facility: [address] [place] [address]  Durable Medical Equipment: Not Applicable    Discharge Preparedness  What is your plan after discharge?:  (IPR)  What are your discharge supports?: Child, Spouse  Prior Functional Level: Ambulatory  Difficulity with ADLs: None  Difficulity with IADLs: None    Functional Assesment  Prior Functional Level:  Ambulatory    Finances  Financial Barriers to Discharge: No  Prescription Coverage: Yes    Advance Directive  Advance Directive?: None    Domestic Abuse  Have you ever been the victim of abuse or violence?: Not Sure  Physical Abuse or Sexual Abuse: Unable to Assess due to Patient Condition  Verbal Abuse or Emotional Abuse: Unable to Assess due to Patient Condition  Possible Abuse/Neglect Reported to:: Not Applicable    Discharge Risks or Barriers  Discharge risks or barriers?: Post-acute placement / services    Anticipated Discharge Information  Discharge Disposition: Disch to IP rehab facility or distinct part unit (62)    Kendra KASPERN, RN [facility]   Care Coordinator

## 2022-10-06 NOTE — CARE PLAN
The patient is Watcher - Medium risk of patient condition declining or worsening    Shift Goals  Clinical Goals: MAP > 65  Patient Goals: Monitor neuro status  Family Goals: Support    Progress made toward(s) clinical / shift goals:    Problem: Knowledge Deficit - Standard  Goal: Patient and family/care givers will demonstrate understanding of plan of care, disease process/condition, diagnostic tests and medications  Outcome: Progressing     Problem: Pain - Standard  Goal: Alleviation of pain or a reduction in pain to the patient’s comfort goal  Outcome: Progressing     Problem: Skin Integrity  Goal: Skin integrity is maintained or improved  Outcome: Progressing     Problem: Fall Risk  Goal: Patient will remain free from falls  Outcome: Progressing     Problem: Safety - Medical Restraint  Goal: Remains free of injury from restraints (Restraint for Interference with Medical Device)  Outcome: Progressing  Goal: Free from restraint(s) (Restraint for Interference with Medical Device)  Outcome: Progressing       Patient is not progressing towards the following goals:

## 2022-10-06 NOTE — PROGRESS NOTES
Critical Care Progress Note    Date of admission  10/4/2022    Chief Complaint  57 y.o. male with history of afib on AC who presented to [facility] on 10/2/22 with fevers, dyspnea, headache, tachycardia, abdominal bloating/distention, anorexia, and petechial rash x2 weeks. This all started apparently after a tick bite on the medial aspect of the LLE in [place]. He initially went to urgent care in [facility] where he was diagnosed with prostatitis and given antibiotics which did not improve his symptoms.  He then presented to [facility] with the above symptoms and was discharged after apparently unremarkable exam.  He presented to the ED again on 10/2/2022 with worsening symptoms.   He was noted to have normal trop, BNP and had mild elevated in transaminases.  Chest x-ray was concerning for congestive heart failure. He as given bronchodilators, ceftriaxone and placed on bipap. He was started on CTX, vancomycin, ampicillin, doxycycline because he initially refused lumbar puncture. he was noted to become progressively more confused on 10/3/2022 so he got MRI (unremarkable) and LP. TTE was unremarkable.  Acyclovir was added after LP was done.   10/6-patient awake and calm this morning.  Following commands.    Interval Problem Update  Chart review from the past 24 hours includes imaging, laboratory studies, vital signs and notes available.  Pertinent data for today's visit includes    Cardiac: -130s on levo  Pulm: 600/8/40%  Heme: stable  /renal: stable  GI/endo: BG <180  ID: UA turbid w/WBC and LE - Ucx ngtd. Pcal 0.31  Resp PCR neg, HIV neg  ESR 15, CRP 3.28  I/O: +3L  Additional Labs/Imaging: LFTs improving, elevated CPK  Additional information:  -Plan to repeat LP today      Review of Systems  Review of Systems   Unable to perform ROS: Medical condition      Vital Signs for last 24 hours   Pulse:  [56-90] 67  Resp:  [6-31] 18  BP: ()/(51-75) 93/56  SpO2:  [90 %-100 %]  95 %    Hemodynamic parameters for last 24 hours       Respiratory Information for the last 24 hours  Vent Mode: APVCMV  Rate (breaths/min): 18  Vt Target (mL): 600  PEEP/CPAP: 8  P Support: 5  MAP: 13  Control VTE (exp VT): 620    Physical Exam   Physical Exam  Vitals and nursing note reviewed.   Constitutional:       General: He is not in acute distress.     Comments: Intubated, sedated   HENT:      Head: Normocephalic.      Nose: Nose normal.      Mouth/Throat:      Mouth: Mucous membranes are moist.   Eyes:      General: No scleral icterus.     Conjunctiva/sclera: Conjunctivae normal.   Cardiovascular:      Rate and Rhythm: Normal rate and regular rhythm.   Pulmonary:      Effort: Pulmonary effort is normal. No respiratory distress.      Breath sounds: No wheezing.   Abdominal:      Palpations: Abdomen is soft.   Skin:     Comments: Fine petechial rash on BLEs. One small ~2mm scab on LLE. Small circular faintly red rash about 2-3cm in diameter with central clearing on posterior RLE above ankle    Neurological:      Comments: Moving all extremities when sedation was decreased and very agitated but not following commands       Medications  Current Facility-Administered Medications   Medication Dose Route Frequency Provider Last Rate Last Admin    doxycycline monohydrate (ADOXA) tablet 100 mg  100 mg Enteral Tube Q12HRS Jennifer Pineda M.D.        acetaminophen (Tylenol) tablet 650 mg  650 mg Enteral Tube Q4HRS PRN Jennifer Pineda M.D.        fentaNYL (SUBLIMAZE) injection 50 mcg  50 mcg Intravenous Q15 MIN PRN Mary Alice L. Latona        And    fentaNYL (SUBLIMAZE) injection 100 mcg  100 mcg Intravenous Q15 MIN PRN Mary Alice L. Latona   100 mcg at 10/05/22 0344    And    fentaNYL (SUBLIMAZE) 50 mcg/mL in 50mL (Continuous Infusion)   Intravenous Continuous Mary Alice L. Latona 4 mL/hr at 10/06/22 1116 200 mcg/hr at 10/06/22 1116    And    dexmedetomidine (PRECEDEX) 400 mcg/100mL NS premix infusion  0-0.7 mcg/kg/hr  Intravenous Continuous Mary Alice L. Latona   Stopped at 10/05/22 0444    propofol (DIPRIVAN) injection  0-80 mcg/kg/min Intravenous Continuous Mary Alice L. Latona 46.1 mL/hr at 10/06/22 1241 60 mcg/kg/min at 10/06/22 1241    lactated ringers infusion   Intravenous Continuous Mary Alice L. Latona 50 mL/hr at 10/06/22 1125 New Bag at 10/06/22 1125    MD Alert...ICU Electrolyte Replacement per Pharmacy   Other PHARMACY TO DOSE Jennifer Pineda M.D.        Pharmacy Consult: Enteral tube insertion - review meds/change route/product selection  1 Each Other PHARMACY TO DOSE Jennifer Pineda M.D.        famotidine (PEPCID) tablet 20 mg  20 mg Enteral Tube Q12HRS Jennifer Pineda M.D.        Or    famotidine (PEPCID) injection 20 mg  20 mg Intravenous Q12HRS Jennifer Pineda M.D.   20 mg at 10/06/22 0547    senna-docusate (PERICOLACE or SENOKOT S) 8.6-50 MG per tablet 2 Tablet  2 Tablet Enteral Tube BID Jennifer Pineda M.D.   2 Tablet at 10/06/22 0547    And    polyethylene glycol/lytes (MIRALAX) PACKET 1 Packet  1 Packet Enteral Tube QDAY PRN Jennifer Pineda M.D.        And    magnesium hydroxide (MILK OF MAGNESIA) suspension 30 mL  30 mL Enteral Tube QDAY PRN Jennifer Pineda M.D.        And    bisacodyl (DULCOLAX) suppository 10 mg  10 mg Rectal QDAY PRN Jennifer Pineda M.D.        norepinephrine (Levophed) 8 mg in 250 mL NS infusion (premix)  0-30 mcg/min Intravenous Continuous Jennifer Pineda M.D. 1.9 mL/hr at 10/06/22 0800 1 mcg/min at 10/06/22 0800    [Held by provider] enoxaparin (Lovenox) inj 40 mg  40 mg Subcutaneous DAILY AT 1800 Jennifer Pineda M.D.   40 mg at 10/05/22 1718    cefTRIAXone (Rocephin) syringe 2 g  2 g Intravenous BID Jennifer Pineda M.D.   2 g at 10/06/22 0547       Fluids    Intake/Output Summary (Last 24 hours) at 10/6/2022 1352  Last data filed at 10/6/2022 1300  Gross per 24 hour   Intake 4587.15 ml   Output 2100 ml   Net 2487.15 ml         Laboratory  Recent Labs      10/04/22  1604 10/05/22  0434   ISTATAPH 7.533* 7.433   ISTATAPCO2 35.7 44.3*   ISTATAPO2 71 77   ISTATATCO2 31 31   QXDUYHE4UXI 96 96   ISTATARTHCO3 30.0* 29.6*   ISTATARTBE 7* 5*   ISTATTEMP 99.7 F 102.0 F   ISTATFIO2 50 50   ISTATSPEC Arterial Arterial   ISTATAPHTC 7.524* 7.405   NNSSHDOH6OG 74 88*       Recent Labs     10/05/22  0425 10/05/22  0945   CPKTOTAL 1127* 1370*       Recent Labs     10/04/22  1411 10/05/22  0425 10/06/22  0601   SODIUM 140 144 142   POTASSIUM 3.9 4.2 3.0*   CHLORIDE 101 108 108   CO2 27 29 25   BUN 37* 39* 29*   CREATININE 1.14 1.05 0.69   MAGNESIUM 2.8* 2.8* 2.6*   PHOSPHORUS 2.7 2.6 1.9*   CALCIUM 7.8* 7.4* 7.3*       Recent Labs     10/04/22  1411 10/05/22  0425 10/06/22  0601   ALTSGPT 82* 68* 76*   ASTSGOT 49* 52* 79*   ALKPHOSPHAT 66 58 48   TBILIRUBIN 0.3 0.3 0.4   PREALBUMIN  --   --  11.6*   GLUCOSE 181* 168* 133*       Recent Labs     10/04/22  1411 10/05/22  0425 10/05/22  0630 10/06/22  0601   WBC 12.2*  --  10.3 7.8   NEUTSPOLYS 39.80*  --  43.00* 45.90   LYMPHOCYTES 44.10*  --  43.90* 36.60   MONOCYTES 15.30*  --  9.60 12.30   EOSINOPHILS 0.00  --  0.00 3.50   BASOPHILS 0.80  --  0.00 0.40   ASTSGOT 49* 52*  --  79*   ALTSGPT 82* 68*  --  76*   ALKPHOSPHAT 66 58  --  48   TBILIRUBIN 0.3 0.3  --  0.4       Recent Labs     10/04/22  1411 10/05/22  0630 10/06/22  0601   RBC 5.19 4.57* 4.06*   HEMOGLOBIN 13.5* 11.8* 10.6*   HEMATOCRIT 42.2 38.0* 33.3*   PLATELETCT 221 220 223         Imaging  Reviewed    Assessment/Plan   Seizures  Altered mental status  Unclear etiology as witnessed tonic-clonic seizure at OSH -concern for meningitis/encephalitis.  He was noted to be progressively more confused during his hospital stay at OSH. CT head at OSH on 10/2 was unremarkable but this was prior to seizure.  CT head 10/4 negative.  LP studies in outside hospital were send out labs-unclear primary might have any results  -Repeat LP today  -SATs  -Ativan as needed seizures  -Infectious  work-up as noted below  -We will get LP studies from outside hospital  -Antibiotics as noted below     Sepsis  Petechial rash  Transaminitis  Unclear etiology of patient's petechial rash with the symptoms fever, chills, abdominal pain/bloating and headache -concerning for meningitis as noted above.  He did have a tick bite on left lower extremity which is concerning for tickborne diseases. HIV negative.  So far, LP at OSH with elevated protein of 57, normal glucose of 78-remaining studies pending  -We will get LP studies, blood and sputum culture results from outside hospital  -Repeat LP today  -Repeat blood cultures, respiratory culture, UA and urine culture, respiratory viral panel  -Follow-up Fungitell, galactomannan, cocci, crypto, Lyme PCR and antibodies, Rickettsia antibodies  -Per ID, discontinue vancomycin and ampicillin   -Continue ceftriaxone for meningitis coverage  -Continue doxycycline for atypical coverage  -Appreciate ID recs -no need for acyclovir at this time  -Follow-up ANCA  -Follow-up urine strep and Legionella     Acute respiratory failure  Patient was noted to have bilateral pulmonary edema at outside hospital with normal TTE and BNP.  He was initially on BiPAP and high flow nasal cannula and then intubated after seizures.  CT chest with bilateral pleural effusions and lower lobe consolidations.  -Lung protective ventilation -wean as tolerated  -Daily SAT/SBT  -Antibiotics as noted above  -Lasix as needed    VTE:  Lovenox  Ulcer: PPI  Lines:  reviewed     I have performed a physical exam and reviewed and updated ROS and Plan today (10/6/2022). In review of yesterday's note (10/5/2022), there are no changes except as documented above.     Discussed patient condition and risk of morbidity and/or mortality with Family, RN, RT, and Pharmacy  The patient remains critically ill.  Critical care time = 35 minutes in directly providing and coordinating critical care and extensive data review.  No time  overlap and excludes procedures.        Jennifer Pineda MD  Pulmonary and Critical Care Medicine  [facility]

## 2022-10-06 NOTE — PROGRESS NOTES
Med Rec Complete per patient's home pharmacy  No antibiotics within the last 30 days  Patient's Preferred Pharmacy: [facility] in [place], [place]      Able to obtain patient's most recent med list, but the last date/time these meds were taken are unknown.

## 2022-10-06 NOTE — CARE PLAN
Problem: Knowledge Deficit - Standard  Goal: Patient and family/care givers will demonstrate understanding of plan of care, disease process/condition, diagnostic tests and medications  Outcome: Progressing     Problem: Pain - Standard  Goal: Alleviation of pain or a reduction in pain to the patient’s comfort goal  Outcome: Progressing     Problem: Skin Integrity  Goal: Skin integrity is maintained or improved  Outcome: Progressing     Problem: Fall Risk  Goal: Patient will remain free from falls  Outcome: Progressing     Problem: Safety - Medical Restraint  Goal: Remains free of injury from restraints (Restraint for Interference with Medical Device)  Outcome: Progressing  Goal: Free from restraint(s) (Restraint for Interference with Medical Device)  Outcome: Progressing   The patient is Watcher - Medium risk of patient condition declining or worsening    Shift Goals  Clinical Goals: maintain MAP > 65  Patient Goals: VSS  Family Goals: updated    Progress made toward(s) clinical / shift goals:  MAP remained above 65. No change in mentation. Patients vital signs remained stable. Patient had a CT of chest completed with contrast     Patient is not progressing towards the following goals:

## 2022-10-06 NOTE — PROGRESS NOTES
Infectious Disease Daily Progress Note    Date of Service  10/6/2022    Attending: Dr. Raiza Richard  Resident: Dr. Brian Levi  Consult Requested By: Jose Alberto Ennis M.D.  Reason for Consultation: possible lyme vs breann mountain spotted fever    Chief Complaint  Josh Trivedi is a 57 y.o. male with past medical history significant for paroxysmal A. fib on Xarelto, obesity, ALEXANDER and no history of epilepsy admitted 10/4/2022 from [facility] ER for dyspnea, rash, and history of tick bite.    Interval Problem Update  SBT's and SAT's attempted on patient, but still intubated at the time of exam.  Last time febrile was 10/5 at 1100, white count dropping from 10.3 to 7.8, CRP and CPK remain elevated in the setting of likely rhabdomyolysis post seizure  CT chest scan showing bilateral pleural effusions associated with linear consolidation of the inferior lung bases.    Code Status  Full Code    Review of Systems  Review of Systems   Unable to perform ROS: Intubated      Physical Exam  Pulse:  [56-90] 64  Resp:  [6-31] 18  BP: ()/(51-75) 97/56  SpO2:  [90 %-100 %] 99 %    General: well nourished, no diaphoresis, shivering, unconscious on propofol  HEENT: sclera anicteric, PERRL; mucous membranes moist, oropharynx visible around tubing clear and moist, no oral lesions or exudate, red contusions on lips  Neck: supple, no lymphadenopathy  Chest: CTAB, no rales, rhonchi or wheezes  Cardiac: regular rate and rhythm, normal S1 S2, no murmurs, rubs or gallops  Abdomen: + bowel sounds, soft, non-distended, no hepatosplenomegaly  Extremities: WWP, no edema, 2+ pedal pulses.   Skin: warm and dry, Bilateral lower extremity erythematous papules sparing soles of feet extending to bilateral hips. No involvement of groin or trunk. Multiple benign cherry hemangiomas also present on trunk and face, distinct from present rash.   Neuro: unable to perform due to sedation; full passive ROM  Psych: unable to perform due to  sedation    Fluids    Intake/Output Summary (Last 24 hours) at 10/6/2022 1549  Last data filed at 10/6/2022 1500  Gross per 24 hour   Intake 3833.62 ml   Output 3130 ml   Net 703.62 ml       Laboratory  Recent Labs     10/04/22  1411 10/05/22  0630 10/06/22  0601   WBC 12.2* 10.3 7.8   RBC 5.19 4.57* 4.06*   HEMOGLOBIN 13.5* 11.8* 10.6*   HEMATOCRIT 42.2 38.0* 33.3*   MCV 81.3* 83.2 82.0   MCH 26.0* 25.8* 26.1*   MCHC 32.0* 31.1* 31.8*   RDW 45.1 44.9 43.8   PLATELETCT 221 220 223   MPV 9.5 9.5 9.6     Recent Labs     10/04/22  1411 10/05/22  0425 10/06/22  0601   SODIUM 140 144 142   POTASSIUM 3.9 4.2 3.0*   CHLORIDE 101 108 108   CO2 27 29 25   GLUCOSE 181* 168* 133*   BUN 37* 39* 29*   CREATININE 1.14 1.05 0.69   CALCIUM 7.8* 7.4* 7.3*     Recent Labs     10/06/22  1331   INR 1.14*         Recent Labs     10/05/22  0425   TRIGLYCERIDE 305*       Imaging  CT-CTA CHEST PULMONARY ARTERY W/ RECONS   Final Result         1.  No pulmonary embolus appreciated.   2.  Moderate bilateral pleural effusions. Associated linear consolidations in the lung bases suggests atelectasis, component of infiltrate not excluded.   3.  Low-density left hepatic lobe lesion, could represent small cyst or hemangioma, otherwise indeterminate. Could be followed up with three-phase CT liver for further characterization.      DX-ABDOMEN FOR TUBE PLACEMENT   Final Result      1.  Enteric tube overlies the proximal stomach.      CT-HEAD W/O   Final Result      Negative noncontrast CT scan of the head / brain.         DX-CHEST-PORTABLE (1 VIEW)   Final Result      1.  Satisfactory lines and tubes as detailed.   2.  Diffuse bilateral pulmonary opacities are nonspecific, may be related to extensive pulmonary edema or infection.   3.  There may be layering small pleural effusions. No pneumothorax.      EC-ECHOCARDIOGRAM COMPLETE W/O CONT    (Results Pending)   DX-LUMBAR PUNCTURE FOR DIAGNOSIS    (Results Pending)        Assessment/Plan  Josh  Shikha is a 57 y.o. male with past medical history significant for paroxysmal A. fib on Xarelto, obesity, ALEXANDER and no history of epilepsy admitted on 10/4/2022 from [facility] ER for dyspnea, rash, and history of tick bite.  Bilateral rash appears to be clearing former petechial rash.  Given history of tick bite and epidemiology of rickettsial species, high suspicion at this moment for Datne Mountain spotted fever.  Rickettsial species have been previously associated with encephalopathy, seizure, and respiratory failure, including bilateral pulmonary effusions, which matches the patient's presentation.  Limited lab data available from [facility] to confirm this.  Rickettsia and other tickborne illnesses are obligate intracellular pathogens and require specialized cultures to properly propagate, which are not available at [facility].  Lab reports specialized intracellular stains for Rickettsia and other tickborne illnesses also not available.  Given difficulty obtaining lab results from [facility] as well as insufficient sample to perform further diagnostic testing on CSF, lumbar puncture was repeated.    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  - full CSF fluid analysis including cell count, protein, glucose and CSF cultures.  Also add meningitis/encephalitis PCR panel, Borrelia serology on CSF and Borrelia burgdorferi reflexive panel on the CSF, Lyme serology on the CSF, serum Lyme PCR, serum Rickettsia Rickettsia serology, cocci serology, serum Aspergillus galactomannan and Fungitell  - Follow urine streptococcal antigen and urine Legionella antigen     Plan of care discussed with Dr. Ruben Staples. Will continue to follow     Brian SCHROEDER  ROSARIO Levi.     Please note that this dictation was created using voice recognition software. I have worked with technical experts from [employer] [facility] to optimize the interface.  I have made every reasonable attempt to correct obvious errors, but there may be errors of grammar and possibly content that I did not discover before finalizing the note.     I have performed a physical exam and reviewed and updated ROS and Plan today (10/6/2022). In review of yesterday's note (10/5/2022), there are no changes except as documented above.

## 2022-10-06 NOTE — DIETARY
"Nutrition Support Assessment:  Day 2 of admit.  Josh Trivedi is a 57 y.o. male with admitting DX of Respiratory failure and seizure.     Current problem list:  Seizure  Acute respiratory failure with hypoxia  Tick bite of L lower leg     Assessment:  Estimated Nutritional Needs based on:   Height: 190.5 cm (6' 3\")  Weight: (!) 134 kg (295 lb 3.1 oz) - bed scale  Weight to Use in Calculations: 134 kg (295 lb 3.1 oz)  Body mass index is 36.9 kg/m²., BMI classification: obesity class II  Ideal body weight: 196 lbs (89.1 kg)    Calculation/Equation: REE per MSJ x 1.0 = 2262 kcals (65-70%= 1470 - 1583 kcals/day)  RMR per PSU (VE: 10.8 L/min and Tmax: 37 C) = 2473 kcals (65-70%= 1607 - 1731 kcals/day)  Total Calories / day: 1474 - 1876  (Calories / k - 14)  Total Grams Protein / day: 134 - 178  (Grams Protein / k.5 - 2.0 of IBW)     Evaluation:   Consult received for TF.  Gastric cortrak placed and confirmed on KUB for enteral access.  Pt is intubated on ventilator. Hx of tick bite ~ 2 weeks ago.  MST 2 on admit screen - LINDA and unsure weight loss not filled out appropriately.   Skin: No wounds or edema noted.  Labs: K 3.0, glucose 133, BUN 29, Corrected Ca 8.7 (wnl), AST 79, ALT 76, Phos 1.9,   Meds: pepcid, KPhos, colace, bowel protocol, fentanyl drip, Levo at 1 mcg/min, propofol at 15 mcg/kg/min (11.5 ml/hr = 304 kcals/day)  Last BM: PTA  SCCM obesity guidelines used.  Low CHO high protein formula indicated.      Malnutrition Risk: N/A     Recommendations/Plan:  Start TF Peptamen Intense VHP at 25 ml/hr and advance per protocol to goal rate of 65 ml/hr to provide 1560 kcals + kcals propofol, 144 gm protein, and 1310 ml free water per day.  Fluids per MD.  Monitor propofol infusion rate.    RD following.            "

## 2022-10-06 NOTE — PROCEDURES
Procedure Lumbar Puncture    Date/Time: 10/6/2022 1:10 PM  Performed by: Jennifer Pineda M.D.  Authorized by: Jennifer Pineda M.D.     Consent:     Consent obtained:  Written    Consent given by:  Spouse    Risks discussed:  Bleeding, headache, infection, nerve damage, pain and repeat procedure  Universal protocol:     Procedure explained and questions answered to patient or proxy's satisfaction: yes      Relevant documents present and verified: yes      Test results available and properly labeled: yes      Imaging studies available: yes      Required blood products, implants, devices, and special equipment available: yes      Immediately prior to procedure a time out was called: yes      Site/side marked: yes      Patient identity confirmed:  Arm band  Pre-procedure details:     Procedure purpose:  Diagnostic    Preparation: Patient was prepped and draped in usual sterile fashion    Sedation:     Sedation type:  Moderate (conscious) sedation  Anesthesia:     Anesthesia method:  None  Procedure details:     Lumbar space:  L3-L4 interspace    Patient position:  L lateral decubitus    Needle gauge:  22    Needle type:  Spinal needle - Quincke tip    Needle length (in):  5.0    Ultrasound guidance: yes    Comments:      Difficulty due to body habitus and seems to have had some sort of spinal surgery in past because he has a horizontal scar at that L3-4 space. Initially used 3.5inch needle and could feel the tip of the spinous process with the needle tip after it was fully inserted into the patient's back - couldn't push it in any further. Removed the needle and got a 5inch needle from IR. Able to feel the spinous process after multiple tries but unable to enter the subarachnoid space despite repositioning. Procedure was terminated.           Jennifer Pineda MD  Pulmonary and Critical Care Medicine  [facility]

## 2022-10-06 NOTE — PROGRESS NOTES
"[facility] ICU Progress Note      Admit Date: 10/4/2022    Resident(s): Ruben Staples M.D.   Attending:  MONIQUE CASH/ Dr. Pineda    Patient ID:    Name:  Josh Trivedi     YOB: 1965  Age:  57 y.o.  male   MRN:  [id number]    Hospital Course (carried forward and updated):  Josh Trivedi is a 57 y.o. male with PMH of obesity, ALEXANDER, and paroxysmal Afib on xarelto and afib who initially presented to [facility] on 10/2 for increasing dyspnea on exertion, fevers and chills, bloating, and headache. Patient presents as a direct admit to [facility] for ICU level care after witnessed seizure on 10/4/2022 and ID consult for concern of Lyme/RMSF/Meningitis. ID consulted.    Consultants:  Critical Care  Infectious Disease     Interval Events:  No acute overnight events. CT chest yesterday showed moderate bilateral pleural effusions and linear consolidations suggestive of consolidation. This AM, responds to name by opening eyes, not following commands, moves all four extremities. Started on levophed 10/5 for pressure support, will attempt to titrate off.    Review of Systems   Unable to perform ROS: Intubated     PHYSICAL EXAM:  Vitals:    10/06/22 0530 10/06/22 0545 10/06/22 0600 10/06/22 0615   BP: 119/58 118/59 106/55 131/66   Pulse: 64 60 68 74   Resp: 18 18 18 (!) 23   TempSrc:   Bladder    SpO2: 100% 98% 100% 99%   Weight:       Height:        Body mass index is 36.9 kg/m².  Latest Vitals:  /66   Pulse 74   Resp (!) 23   Ht 1.905 m (6' 3\")   Wt (!) 134 kg (295 lb 3.1 oz)   SpO2 99%   BMI 36.90 kg/m²   O2 therapy: Pulse Oximetry: 99 %, O2 Delivery Device: Ventilator  Vitals Range last 24h:  Pulse:  [57-90] 74  Resp:  [6-31] 23  BP: ()/(51-74) 131/66  SpO2:  [95 %-100 %] 99 %       Intake/Output Summary (Last 24 hours) at 10/6/2022 0628  Last data filed at 10/6/2022 0600  Gross per 24 hour   Intake 5066.55 ml   Output 1685 ml   Net 3381.55 ml          Physical Exam  Constitutional:     "   General: He is not in acute distress.     Appearance: He is not ill-appearing.   HENT:      Head: Normocephalic and atraumatic.      Nose: No rhinorrhea.      Mouth/Throat:      Mouth: Mucous membranes are dry.      Pharynx: Oropharynx is clear.   Eyes:      Conjunctiva/sclera: Conjunctivae normal.      Pupils: Pupils are equal, round, and reactive to light.   Cardiovascular:      Rate and Rhythm: Normal rate and regular rhythm.      Pulses: Normal pulses.      Heart sounds: Normal heart sounds.     No gallop.   Pulmonary:      Effort: Pulmonary effort is normal.      Breath sounds: Normal breath sounds. No wheezing or rales.   Abdominal:      General: There is no distension.   Skin:     General: Skin is warm.      Capillary Refill: Capillary refill takes less than 2 seconds.   Neurological:      Motor: No weakness.      Comments: Unalbe to access; patient intubated and sedated; able to open eyes when name called   Psychiatric:      Comments: Unable to assess; patient intubated and sedated       Recent Labs     10/04/22  1604 10/05/22  0434   ISTATAPH 7.533* 7.433   ISTATAPCO2 35.7 44.3*   ISTATAPO2 71 77   ISTATATCO2 31 31   [id number] 96 96   ISTATARTHCO3 30.0* 29.6*   ISTATARTBE 7* 5*   ISTATTEMP 99.7 F 102.0 F   ISTATFIO2 50 50   ISTATSPEC Arterial Arterial   ISTATAPHTC 7.524* 7.405   [id number] 74 88*       Recent Labs     10/04/22  1411 10/05/22  0425   SODIUM 140 144   POTASSIUM 3.9 4.2   CHLORIDE 101 108   CO2 27 29   BUN 37* 39*   CREATININE 1.14 1.05   MAGNESIUM 2.8* 2.8*   PHOSPHORUS 2.7 2.6   CALCIUM 7.8* 7.4*       Recent Labs     10/04/22  1411 10/05/22  0425   ALTSGPT 82* 68*   ASTSGOT 49* 52*   ALKPHOSPHAT 66 58   TBILIRUBIN 0.3 0.3   GLUCOSE 181* 168*       Recent Labs     10/04/22  1411 10/05/22  0630   RBC 5.19 4.57*   HEMOGLOBIN 13.5* 11.8*   HEMATOCRIT 42.2 38.0*   PLATELETCT 221 220       Recent Labs     10/04/22  1411 10/05/22  0425 10/05/22  0630   WBC 12.2*  --  10.3   NEUTSPOLYS 39.80*   --  43.00*   LYMPHOCYTES 44.10*  --  43.90*   MONOCYTES 15.30*  --  9.60   EOSINOPHILS 0.00  --  0.00   BASOPHILS 0.80  --  0.00   ASTSGOT 49* 52*  --    ALTSGPT 82* 68*  --    ALKPHOSPHAT 66 58  --    TBILIRUBIN 0.3 0.3  --          Meds:   fentaNYL  50 mcg      And    fentaNYL  100 mcg      And    fentaNYL   200 mcg/hr (10/05/22 1326)    And    dexmedetomidine (Precedex) infusion  0-0.7 mcg/kg/hr Stopped (10/05/22 0444)    propofol  0-80 mcg/kg/min 50 mcg/kg/min (10/06/22 0600)    LR   50 mL/hr at 10/05/22 1415    acetaminophen  975 mg      MD Alert...Adult ICU Electrolyte Replacement per Pharmacy        Pharmacy  1 Each      famotidine  20 mg      Or    famotidine  20 mg      senna-docusate  2 Tablet      And    polyethylene glycol/lytes  1 Packet      And    magnesium hydroxide  30 mL      And    bisacodyl  10 mg      norepinephrine (Levophed) infusion  0-30 mcg/min 3 mcg/min (10/06/22 0600)    enoxaparin (LOVENOX) injection  40 mg      cefTRIAXone (ROCEPHIN) IV  2 g      doxycycline  100 mg 100 mg (10/06/22 0550)    fentaNYL   mcg          Procedures:  none    Imaging:  CT-CTA CHEST PULMONARY ARTERY W/ RECONS   Final Result         1.  No pulmonary embolus appreciated.   2.  Moderate bilateral pleural effusions. Associated linear consolidations in the lung bases suggests atelectasis, component of infiltrate not excluded.   3.  Low-density left hepatic lobe lesion, could represent small cyst or hemangioma, otherwise indeterminate. Could be followed up with three-phase CT liver for further characterization.      DX-ABDOMEN FOR TUBE PLACEMENT   Final Result      1.  Enteric tube overlies the proximal stomach.      CT-HEAD W/O   Final Result      Negative noncontrast CT scan of the head / brain.         DX-CHEST-PORTABLE (1 VIEW)   Final Result      1.  Satisfactory lines and tubes as detailed.   2.  Diffuse bilateral pulmonary opacities are nonspecific, may be related to extensive pulmonary edema or  infection.   3.  There may be layering small pleural effusions. No pneumothorax.      EC-ECHOCARDIOGRAM COMPLETE W/O CONT    (Results Pending)       Problem and Plan:    * Seizure (HCC)- (present on admission)  Assessment & Plan  One episode of witnessed tonic-clonic seizure at [facility] on 10/4; prompted intubation and sedation. Unclear etiology, but given positive UDS and possible tickborne illness, both may have contributed to seizure like activity.  -Trial SBT/SAT  -decrease propofol/dexmedetomidine   -consider ativan pushes for break through seizure  -consider EEG  -goal SpO2 >90%  -repeat infectious work up (Fungitell, galactomannan, cocci, crypto, Lyme PCR and antibodies, Rickettsia antibodies)  -follow up CT head - no acute findings  -consider repeat LP if lab unable to come in time    Tick bite of left lower leg  Assessment & Plan  Initially presented to OSH as A&O x4, over course of hospitalization, patient became delirious, A&O x0; one episode of witnessed seizure on 10/4, now intubated and sedated. History of tick bite approximately 2 weeks ago with accompanying petechial rash and headaches/subjective fevers since then. Currently awaiting further LP studies and lyme serology.  -telemetry  -ID consulted, appreciate recs  -continue ceftriaxone, doxycycline to cover for Lyme/RMSF/meningitis  -Further infectious cause work up (Fungitell, galactomannan, cocci, crypto, Lyme PCR and antibodies, Rickettsia antibodies)      Acute respiratory failure with hypoxia (HCC)  Assessment & Plan  Patient initially requiring nasal cannula at 2-3L, then increased to HFNC, to BIPAP and then to being intubated and sedated and transferred to [facility]. Chest XR demonstrates. Previous work up for CHF negative at OSH.  -continue intubation/sedation  -Goal SpO2 > 90%  -RT/O2 protocol  -ABCDEF bundle  -SAT/SBT as tolerated  -titrate down propofol/dexmedetomidine  -diuresis as appropriate  -repeat echo    Paroxysmal A-fib  (HCC)  Assessment & Plan  History of paroxysmal afib on metoprolol and xarelto.  -telemetry  -restart metoprolol as clinically appropriate  -hold xarelto, potentially may need repeat LP  -DVT prophylaxis      DISPO: ICU    CODE STATUS: Full    Quality Measures:  Feeding: NPO  Analgesia: fentanyl, tylenol  Sedation: propofol, dexmedetomidine  Thromboprophylaxis: lovenox  Head of bed: >30 degrees  Ulcer prophylaxis: none  Glycemic control: none  Bowel care: bowel regimen  Indwelling lines: pIV, RIJ, barrera, ET tube  Deescalation of antibiotics: on ampicillin, vancomycin, ceftriaxone, and doxycycline      Ruben Staples M.D.

## 2022-10-06 NOTE — PROGRESS NOTES
"Unable to address med rec. Patient unable to participate in interview (intubated). Spouse Jessica ([phone]) unsure of medications patient uses at home; spouse states she \"believes\" patient receives his medications from [facility] in [place] ([phone]), but is not sure; calls to pharmacy go to voicemail at this time.  Allergies reviewed with patient's spouse.  "

## 2022-10-07 ENCOUNTER — APPOINTMENT (OUTPATIENT)
Dept: CARDIOLOGY | Facility: MEDICAL CENTER | Age: 57
DRG: 871 | End: 2022-10-07
Attending: STUDENT IN AN ORGANIZED HEALTH CARE EDUCATION/TRAINING PROGRAM
Payer: COMMERCIAL

## 2022-10-07 ENCOUNTER — APPOINTMENT (OUTPATIENT)
Dept: RADIOLOGY | Facility: MEDICAL CENTER | Age: 57
DRG: 871 | End: 2022-10-07
Attending: STUDENT IN AN ORGANIZED HEALTH CARE EDUCATION/TRAINING PROGRAM
Payer: COMMERCIAL

## 2022-10-07 LAB
ALBUMIN SERPL BCP-MCNC: 2.2 G/DL (ref 3.2–4.9)
ALBUMIN/GLOB SERPL: 0.7 G/DL
ALP SERPL-CCNC: 56 U/L (ref 30–99)
ALT SERPL-CCNC: 96 U/L (ref 2–50)
ANION GAP SERPL CALC-SCNC: 9 MMOL/L (ref 7–16)
AST SERPL-CCNC: 84 U/L (ref 12–45)
BASOPHILS # BLD AUTO: 0.4 % (ref 0–1.8)
BASOPHILS # BLD: 0.03 K/UL (ref 0–0.12)
BILIRUB SERPL-MCNC: 0.6 MG/DL (ref 0.1–1.5)
BUN SERPL-MCNC: 21 MG/DL (ref 8–22)
CALCIUM SERPL-MCNC: 7.4 MG/DL (ref 8.5–10.5)
CHLORIDE SERPL-SCNC: 106 MMOL/L (ref 96–112)
CO2 SERPL-SCNC: 27 MMOL/L (ref 20–33)
CREAT SERPL-MCNC: 0.73 MG/DL (ref 0.5–1.4)
EOSINOPHIL # BLD AUTO: 0.35 K/UL (ref 0–0.51)
EOSINOPHIL NFR BLD: 4.5 % (ref 0–6.9)
ERYTHROCYTE [DISTWIDTH] IN BLOOD BY AUTOMATED COUNT: 43.5 FL (ref 35.9–50)
GFR SERPLBLD CREATININE-BSD FMLA CKD-EPI: 107 ML/MIN/1.73 M 2
GLOBULIN SER CALC-MCNC: 3.2 G/DL (ref 1.9–3.5)
GLUCOSE SERPL-MCNC: 109 MG/DL (ref 65–99)
HCT VFR BLD AUTO: 33.7 % (ref 42–52)
HGB BLD-MCNC: 10.9 G/DL (ref 14–18)
IMM GRANULOCYTES # BLD AUTO: 0.13 K/UL (ref 0–0.11)
IMM GRANULOCYTES NFR BLD AUTO: 1.7 % (ref 0–0.9)
LYMPHOCYTES # BLD AUTO: 2.69 K/UL (ref 1–4.8)
LYMPHOCYTES NFR BLD: 34.8 % (ref 22–41)
MAGNESIUM SERPL-MCNC: 2.4 MG/DL (ref 1.5–2.5)
MCH RBC QN AUTO: 26.3 PG (ref 27–33)
MCHC RBC AUTO-ENTMCNC: 32.3 G/DL (ref 33.7–35.3)
MCV RBC AUTO: 81.4 FL (ref 81.4–97.8)
MONOCYTES # BLD AUTO: 0.56 K/UL (ref 0–0.85)
MONOCYTES NFR BLD AUTO: 7.2 % (ref 0–13.4)
MYELOPEROXIDASE AB SER-ACNC: 0 AU/ML (ref 0–19)
NEUTROPHILS # BLD AUTO: 3.98 K/UL (ref 1.82–7.42)
NEUTROPHILS NFR BLD: 51.4 % (ref 44–72)
NRBC # BLD AUTO: 0 K/UL
NRBC BLD-RTO: 0 /100 WBC
PHOSPHATE SERPL-MCNC: 3.1 MG/DL (ref 2.5–4.5)
PLATELET # BLD AUTO: 200 K/UL (ref 164–446)
PMV BLD AUTO: 9.6 FL (ref 9–12.9)
POTASSIUM SERPL-SCNC: 3.2 MMOL/L (ref 3.6–5.5)
PROT SERPL-MCNC: 5.4 G/DL (ref 6–8.2)
PROTEINASE3 AB SER-ACNC: 5 AU/ML (ref 0–19)
RBC # BLD AUTO: 4.14 M/UL (ref 4.7–6.1)
SODIUM SERPL-SCNC: 142 MMOL/L (ref 135–145)
TRIGL SERPL-MCNC: 310 MG/DL (ref 0–149)
WBC # BLD AUTO: 7.7 K/UL (ref 4.8–10.8)

## 2022-10-07 PROCEDURE — 99291 CRITICAL CARE FIRST HOUR: CPT | Performed by: STUDENT IN AN ORGANIZED HEALTH CARE EDUCATION/TRAINING PROGRAM

## 2022-10-07 PROCEDURE — 94799 UNLISTED PULMONARY SVC/PX: CPT

## 2022-10-07 PROCEDURE — 84100 ASSAY OF PHOSPHORUS: CPT

## 2022-10-07 PROCEDURE — 99233 SBSQ HOSP IP/OBS HIGH 50: CPT | Mod: GC | Performed by: INTERNAL MEDICINE

## 2022-10-07 PROCEDURE — 83735 ASSAY OF MAGNESIUM: CPT

## 2022-10-07 PROCEDURE — A9270 NON-COVERED ITEM OR SERVICE: HCPCS | Performed by: STUDENT IN AN ORGANIZED HEALTH CARE EDUCATION/TRAINING PROGRAM

## 2022-10-07 PROCEDURE — 80053 COMPREHEN METABOLIC PANEL: CPT

## 2022-10-07 PROCEDURE — 700111 HCHG RX REV CODE 636 W/ 250 OVERRIDE (IP): Performed by: NURSE PRACTITIONER

## 2022-10-07 PROCEDURE — 700111 HCHG RX REV CODE 636 W/ 250 OVERRIDE (IP): Performed by: STUDENT IN AN ORGANIZED HEALTH CARE EDUCATION/TRAINING PROGRAM

## 2022-10-07 PROCEDURE — 700105 HCHG RX REV CODE 258: Performed by: NURSE PRACTITIONER

## 2022-10-07 PROCEDURE — 93306 TTE W/DOPPLER COMPLETE: CPT

## 2022-10-07 PROCEDURE — 84478 ASSAY OF TRIGLYCERIDES: CPT

## 2022-10-07 PROCEDURE — 85025 COMPLETE CBC W/AUTO DIFF WBC: CPT

## 2022-10-07 PROCEDURE — 770022 HCHG ROOM/CARE - ICU (200)

## 2022-10-07 PROCEDURE — 700102 HCHG RX REV CODE 250 W/ 637 OVERRIDE(OP): Performed by: STUDENT IN AN ORGANIZED HEALTH CARE EDUCATION/TRAINING PROGRAM

## 2022-10-07 PROCEDURE — 94003 VENT MGMT INPAT SUBQ DAY: CPT

## 2022-10-07 RX ORDER — ONDANSETRON 2 MG/ML
4 INJECTION INTRAMUSCULAR; INTRAVENOUS EVERY 6 HOURS PRN
Status: DISCONTINUED | OUTPATIENT
Start: 2022-10-07 | End: 2022-10-19 | Stop reason: HOSPADM

## 2022-10-07 RX ORDER — FUROSEMIDE 10 MG/ML
40 INJECTION INTRAMUSCULAR; INTRAVENOUS
Status: DISCONTINUED | OUTPATIENT
Start: 2022-10-07 | End: 2022-10-16

## 2022-10-07 RX ORDER — AMOXICILLIN 250 MG
2 CAPSULE ORAL 2 TIMES DAILY
Status: DISCONTINUED | OUTPATIENT
Start: 2022-10-07 | End: 2022-10-19 | Stop reason: HOSPADM

## 2022-10-07 RX ORDER — POLYETHYLENE GLYCOL 3350 17 G/17G
1 POWDER, FOR SOLUTION ORAL
Status: DISCONTINUED | OUTPATIENT
Start: 2022-10-07 | End: 2022-10-19 | Stop reason: HOSPADM

## 2022-10-07 RX ORDER — BISACODYL 10 MG
10 SUPPOSITORY, RECTAL RECTAL
Status: DISCONTINUED | OUTPATIENT
Start: 2022-10-07 | End: 2022-10-19 | Stop reason: HOSPADM

## 2022-10-07 RX ORDER — DOXYCYCLINE 100 MG/1
100 TABLET ORAL EVERY 12 HOURS
Status: DISCONTINUED | OUTPATIENT
Start: 2022-10-07 | End: 2022-10-19 | Stop reason: HOSPADM

## 2022-10-07 RX ORDER — LINEZOLID 600 MG/1
600 TABLET, FILM COATED ORAL EVERY 12 HOURS
Status: DISCONTINUED | OUTPATIENT
Start: 2022-10-07 | End: 2022-10-11

## 2022-10-07 RX ORDER — ONDANSETRON 2 MG/ML
INJECTION INTRAMUSCULAR; INTRAVENOUS
Status: ACTIVE
Start: 2022-10-07 | End: 2022-10-08

## 2022-10-07 RX ORDER — LINEZOLID 600 MG/1
600 TABLET, FILM COATED ORAL EVERY 12 HOURS
Status: DISCONTINUED | OUTPATIENT
Start: 2022-10-07 | End: 2022-10-07

## 2022-10-07 RX ORDER — ACETAMINOPHEN 325 MG/1
650 TABLET ORAL EVERY 4 HOURS PRN
Status: DISCONTINUED | OUTPATIENT
Start: 2022-10-07 | End: 2022-10-19 | Stop reason: HOSPADM

## 2022-10-07 RX ORDER — POTASSIUM CHLORIDE 7.45 MG/ML
10 INJECTION INTRAVENOUS
Status: COMPLETED | OUTPATIENT
Start: 2022-10-07 | End: 2022-10-07

## 2022-10-07 RX ADMIN — ACETAMINOPHEN 650 MG: 325 TABLET, FILM COATED ORAL at 02:02

## 2022-10-07 RX ADMIN — POTASSIUM BICARBONATE 25 MEQ: 977.5 TABLET, EFFERVESCENT ORAL at 06:06

## 2022-10-07 RX ADMIN — ACETAMINOPHEN 650 MG: 325 TABLET, FILM COATED ORAL at 11:39

## 2022-10-07 RX ADMIN — ACETAMINOPHEN 650 MG: 325 TABLET, FILM COATED ORAL at 05:39

## 2022-10-07 RX ADMIN — LINEZOLID 600 MG: 600 TABLET, FILM COATED ORAL at 18:04

## 2022-10-07 RX ADMIN — ACETAMINOPHEN 650 MG: 325 TABLET, FILM COATED ORAL at 18:00

## 2022-10-07 RX ADMIN — CEFTRIAXONE SODIUM 2 G: 10 INJECTION, POWDER, FOR SOLUTION INTRAVENOUS at 05:39

## 2022-10-07 RX ADMIN — PROPOFOL 25 MCG/KG/MIN: 10 INJECTION, EMULSION INTRAVENOUS at 00:14

## 2022-10-07 RX ADMIN — FAMOTIDINE 20 MG: 10 INJECTION, SOLUTION INTRAVENOUS at 05:39

## 2022-10-07 RX ADMIN — DOXYCYCLINE 100 MG: 100 TABLET, FILM COATED ORAL at 18:04

## 2022-10-07 RX ADMIN — POLYETHYLENE GLYCOL 3350 1 PACKET: 17 POWDER, FOR SOLUTION ORAL at 05:39

## 2022-10-07 RX ADMIN — DOXYCYCLINE 100 MG: 100 TABLET, FILM COATED ORAL at 05:40

## 2022-10-07 RX ADMIN — SODIUM CHLORIDE, POTASSIUM CHLORIDE, SODIUM LACTATE AND CALCIUM CHLORIDE: 600; 310; 30; 20 INJECTION, SOLUTION INTRAVENOUS at 07:34

## 2022-10-07 RX ADMIN — DOCUSATE SODIUM 50 MG AND SENNOSIDES 8.6 MG 2 TABLET: 8.6; 5 TABLET, FILM COATED ORAL at 05:40

## 2022-10-07 RX ADMIN — POTASSIUM BICARBONATE 25 MEQ: 977.5 TABLET, EFFERVESCENT ORAL at 10:09

## 2022-10-07 RX ADMIN — SENNOSIDES AND DOCUSATE SODIUM 2 TABLET: 50; 8.6 TABLET ORAL at 18:05

## 2022-10-07 RX ADMIN — PROPOFOL 35 MCG/KG/MIN: 10 INJECTION, EMULSION INTRAVENOUS at 04:13

## 2022-10-07 RX ADMIN — ONDANSETRON 4 MG: 2 INJECTION INTRAMUSCULAR; INTRAVENOUS at 19:19

## 2022-10-07 RX ADMIN — POTASSIUM CHLORIDE 10 MEQ: 7.46 INJECTION, SOLUTION INTRAVENOUS at 11:39

## 2022-10-07 RX ADMIN — POTASSIUM BICARBONATE 25 MEQ: 977.5 TABLET, EFFERVESCENT ORAL at 13:40

## 2022-10-07 RX ADMIN — FUROSEMIDE 40 MG: 10 INJECTION, SOLUTION INTRAMUSCULAR; INTRAVENOUS at 10:29

## 2022-10-07 ASSESSMENT — FIBROSIS 4 INDEX
FIB4 SCORE: 2.36
FIB4 SCORE: 2.36

## 2022-10-07 ASSESSMENT — PAIN DESCRIPTION - PAIN TYPE
TYPE: ACUTE PAIN
TYPE: ACUTE PAIN

## 2022-10-07 NOTE — RESPIRATORY CARE
ETT found to be measuring 24cm @ upper Lip.    Previous charting shows ETT depth to be 27cm.    ETT advanced to 27cm Upper Lip.

## 2022-10-07 NOTE — PROGRESS NOTES
Infectious Disease Daily Progress Note    Date of Service  10/7/2022    Attending: Dr. Raiza Richard  Resident: Dr. Brian Levi  Consult Requested By: Jose Alberto Ennis M.D.  Reason for Consultation: possible lyme vs breann mountain spotted fever    Chief Complaint  Josh Trivedi is a 57 y.o. male with past medical history significant for paroxysmal A. fib on Xarelto, obesity, ALEXANDER and no history of epilepsy admitted 10/4/2022 from [facility] ER for dyspnea, rash, and history of tick bite.    Interval Problem Update  Patient remains intubated but has been weaned off sedation at time of exam.  Patient febrile this morning, white count stable  Spoke with inpatient manager at [facility].  CSF cultures no growth to date.  urine streptococcal antigen and urine Legionella antigen negative    Code Status  Full Code    Review of Systems  Review of Systems   Unable to perform ROS: Intubated      Physical Exam  Pulse:  [63-92] 86  Resp:  [18-21] 18  BP: ()/(50-72) 116/65  SpO2:  [93 %-100 %] 95 %    General: well nourished, no diaphoresis, shivering, unconscious on propofol  HEENT: sclera anicteric, PERRL; mucous membranes moist, oropharynx visible around tubing clear and moist, no oral lesions or exudate, red contusions on lips  Neck: supple, no lymphadenopathy  Chest: CTAB, no rales, rhonchi or wheezes  Cardiac: regular rate and rhythm, normal S1 S2, no murmurs, rubs or gallops  Abdomen: + bowel sounds, soft, non-distended, no hepatosplenomegaly  Extremities: WWP, no edema, 2+ pedal pulses.   Skin: warm and dry, Bilateral lower extremity erythematous papules sparing soles of feet extending to bilateral hips. No involvement of groin or trunk. Multiple benign cherry hemangiomas also present on trunk and face, distinct from present rash.   Neuro: unable to perform due to sedation; full passive ROM  Psych: unable to perform due to sedation    Fluids    Intake/Output Summary (Last 24 hours) at 10/7/2022  1311  Last data filed at 10/7/2022 1300  Gross per 24 hour   Intake 2401.77 ml   Output 3650 ml   Net -1248.23 ml         Laboratory  Recent Labs     10/05/22  0630 10/06/22  0601 10/07/22  0424   WBC 10.3 7.8 7.7   RBC 4.57* 4.06* 4.14*   HEMOGLOBIN 11.8* 10.6* 10.9*   HEMATOCRIT 38.0* 33.3* 33.7*   MCV 83.2 82.0 81.4   MCH 25.8* 26.1* 26.3*   MCHC 31.1* 31.8* 32.3*   RDW 44.9 43.8 43.5   PLATELETCT 220 223 200   MPV 9.5 9.6 9.6       Recent Labs     10/05/22  0425 10/06/22  0601 10/07/22  0424   SODIUM 144 142 142   POTASSIUM 4.2 3.0* 3.2*   CHLORIDE 108 108 106   CO2 29 25 27   GLUCOSE 168* 133* 109*   BUN 39* 29* 21   CREATININE 1.05 0.69 0.73   CALCIUM 7.4* 7.3* 7.4*       Recent Labs     10/06/22  1331   INR 1.14*           Recent Labs     10/05/22  0425 10/07/22  0424   TRIGLYCERIDE 305* 310*         Imaging  CT-CTA CHEST PULMONARY ARTERY W/ RECONS   Final Result         1.  No pulmonary embolus appreciated.   2.  Moderate bilateral pleural effusions. Associated linear consolidations in the lung bases suggests atelectasis, component of infiltrate not excluded.   3.  Low-density left hepatic lobe lesion, could represent small cyst or hemangioma, otherwise indeterminate. Could be followed up with three-phase CT liver for further characterization.      DX-ABDOMEN FOR TUBE PLACEMENT   Final Result      1.  Enteric tube overlies the proximal stomach.      CT-HEAD W/O   Final Result      Negative noncontrast CT scan of the head / brain.         DX-CHEST-PORTABLE (1 VIEW)   Final Result      1.  Satisfactory lines and tubes as detailed.   2.  Diffuse bilateral pulmonary opacities are nonspecific, may be related to extensive pulmonary edema or infection.   3.  There may be layering small pleural effusions. No pneumothorax.      EC-ECHOCARDIOGRAM COMPLETE W/O CONT    (Results Pending)          Assessment/Plan  Josh Trivedi is a 57 y.o. male with past medical history significant for paroxysmal A. fib on Xarelto,  obesity, ALEXANDER and no history of epilepsy admitted on 10/4/2022 from [facility] ER for dyspnea, rash, and history of tick bite.  Bilateral rash appears to be clearing former petechial rash.  Given history of tick bite and epidemiology of rickettsial species, favoring [place] Mountain spotted fever.  Rickettsial species have been previously associated with encephalopathy, seizure, and respiratory failure, including bilateral pulmonary effusions, which matches the patient's presentation.  Limited lab data available from [facility] to confirm this.  With the lack of CSF cytology studies, empiric treatment of bacterial meningitis must also be pursued.  CSF cultures at outside hospital show no growth to date, but rickettsia and other tickborne illnesses would not grow on regular cultures.      Patient now showing 3 consecutive days of increasing AST/ALT elevations.  Exceedingly rare cases have been reported of concurrent jaundice with [place] spotted fever, however there is been no literature supporting direct elevation of AST/ALT and no publications have reported on rickettsial species related liver dysfunction since 1978. This trend of LFT elevation also does not correspond with his otherwise clinically improving picture, and more closely correlates with current treatment regimen.  Suspecting ceftriaxone for empiric treatment of bacterial meningitis is to blame, but LFT elevations have also been less frequently reported with tetracyclines as well.  Ceftriaxone is also associated with drug-induced fever, which may explain why the patient continues to fever while improving clinically.    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  Okay to discontinue ceftriaxone in setting of AST/ALT elevations.  - Continue to follow LFTs to look for trend down in setting of  discontinuing ceftriaxone.  -Continue to follow fever curve in setting of discontinuing ceftriaxone.  - Once patient tolerates p.o., okay to convert to 100 mg doxycycline p.o. twice daily.  He will need total course of 14 days, last day of treatment October 17.  - Consider using dermatoscope or other bedside microscopy to rule out retained head or mouth parts at site of tick bite on medial aspect of left ankle.  - 2.5 cc CSF received from [facility] yesterday.  Given that Lyme disease has been most clinical indications for chronic disease and in the setting of clinical improvement on current regimen, it makes most sense to send remaining CSF for Lyme serologies and PCR to rule out.     Plan of care discussed with Dr. Ruben Staples.  We will sign off.  Feel free to call if you have any questions.     Brian Levi M.D.     Please note that this dictation was created using voice recognition software. I have worked with technical experts from [facility] to optimize the interface.  I have made every reasonable attempt to correct obvious errors, but there may be errors of grammar and possibly content that I did not discover before finalizing the note.     I have performed a physical exam and reviewed and updated ROS and Plan today (10/7/2022). In review of yesterday's note (10/6/2022), there are no changes except as documented above.

## 2022-10-07 NOTE — FLOWSHEET NOTE
Pt extubated per MD's order.         10/07/22 1424   Vital Signs   Pulse 84   Respiration 16   Pulse Oximetry 94 %   Respiratory Assessment   Respiratory Pattern Within Normal Limits   Level of Consciousness Alert   Chest Exam   Work Of Breathing / Effort Within Normal Limits   Breath Sounds   RUL Breath Sounds Clear   RML Breath Sounds Clear   RLL Breath Sounds Diminished   KASEY Breath Sounds Clear   LLL Breath Sounds Diminished   Secretions   Cough Strong   Oxygen   O2 (LPM) 3   Oxygen Equipment Initial Setup   O2 Delivery Device Nasal Cannula

## 2022-10-07 NOTE — CARE PLAN
Neuro status stable. Pt follows commands. Bed bath completed, CHG wipes applied, linen changed, barrera care completed. Q2H turns performed. T Max 100.9- PRN tylenol given Q4H. Sedation vacation in progress.     he patient is Stable - Low risk of patient condition declining or worsening    Shift Goals  Clinical Goals: Map >65, Q4H neuro status. Q2H turns, bed bath, Promote rest/sleep/comfort, pain control, Q2H oral care  Patient Goals: LINDA  Family Goals: LINDA    Progress made toward(s) clinical / shift goals:      Problem: Knowledge Deficit - Standard  Goal: Patient and family/care givers will demonstrate understanding of plan of care, disease process/condition, diagnostic tests and medications  Outcome: Progressing     Problem: Pain - Standard  Goal: Alleviation of pain or a reduction in pain to the patient’s comfort goal  Outcome: Progressing     Problem: Skin Integrity  Goal: Skin integrity is maintained or improved  Outcome: Progressing     Problem: Fall Risk  Goal: Patient will remain free from falls  Outcome: Progressing     Problem: Safety - Medical Restraint  Goal: Remains free of injury from restraints (Restraint for Interference with Medical Device)  Outcome: Progressing  Goal: Free from restraint(s) (Restraint for Interference with Medical Device)  Outcome: Progressing     Patient is not progressing towards the following goals:

## 2022-10-07 NOTE — CARE PLAN
Problem: Ventilation  Goal: Ability to achieve and maintain unassisted ventilation or tolerate decreased levels of ventilator support  Description: Target End Date:  4 days     Document on Vent flowsheet    1.  Support and monitor invasive and noninvasive mechanical ventilation  2.  Monitor ventilator weaning response  3.  Perform ventilator associated pneumonia prevention interventions  4.  Manage ventilation therapy by monitoring diagnostic test results  Outcome: Progressing       Vent day 4    8.0 @ 27    18, 600, +8, 40%

## 2022-10-07 NOTE — PROGRESS NOTES
Critical Care Progress Note    Date of admission  10/4/2022    Chief Complaint  57 y.o. male with history of afib on AC who presented to [facility] on 10/2/22 with fevers, dyspnea, headache, tachycardia, abdominal bloating/distention, anorexia, and petechial rash x2 weeks. This all started apparently after a tick bite on the medial aspect of the LLE in [place]. He initially went to urgent care in [facility] where he was diagnosed with prostatitis and given antibiotics which did not improve his symptoms.  He then presented to [facility] with the above symptoms and was discharged after apparently unremarkable exam.  He presented to the ED again on 10/2/2022 with worsening symptoms.   He was noted to have normal trop, BNP and had mild elevated in transaminases.  Chest x-ray was concerning for congestive heart failure. He as given bronchodilators, ceftriaxone and placed on bipap. He was started on CTX, vancomycin, ampicillin, doxycycline because he initially refused lumbar puncture. he was noted to become progressively more confused on 10/3/2022 so he got MRI (unremarkable) and LP. TTE was unremarkable.  Acyclovir was added after LP was done.   10/6-patient awake and calm this morning.  Following commands.    Interval Problem Update  Chart review from the past 24 hours includes imaging, laboratory studies, vital signs and notes available.  Pertinent data for today's visit includes    Cardiac: SBP 110s off levo  Pulm: 600/8/40%  Heme: downtrending - no e/o bleed  /renal: stable  GI/endo: BG <180  ID: UA turbid w/WBC and LE - Ucx ngtd. Pcal 0.31  Resp PCR neg, HIV neg  ESR 15, CRP 3.28  I/O: +850cc  Additional Labs/Imaging: LFTs improving, elevated CPK  - crypto, galactomannan, RVP, HIV neg  - fungitell indeterminate  Additional information:  - still with low grade fevers  - no need for LP per ID since his mentation improved significantly - will do 14 day course of ceftriaxone and  Doxy    Review of Systems  Review of Systems   Unable to perform ROS: Medical condition      Vital Signs for last 24 hours   Pulse:  [63-90] 90  Resp:  [18-21] 21  BP: ()/(50-75) 121/72  SpO2:  [90 %-100 %] 97 %    Hemodynamic parameters for last 24 hours       Respiratory Information for the last 24 hours  Vent Mode: APVCMV  Rate (breaths/min): 18  Vt Target (mL): 600  PEEP/CPAP: 8  MAP: 13  Control VTE (exp VT): 604    Physical Exam   Physical Exam  Vitals and nursing note reviewed.   Constitutional:       General: He is not in acute distress.     Comments: Intubated, awake, alert   HENT:      Head: Normocephalic.      Nose: Nose normal.      Mouth/Throat:      Mouth: Mucous membranes are moist.   Eyes:      General: No scleral icterus.     Conjunctiva/sclera: Conjunctivae normal.   Cardiovascular:      Rate and Rhythm: Normal rate and regular rhythm.   Pulmonary:      Effort: Pulmonary effort is normal. No respiratory distress.      Breath sounds: No wheezing.   Abdominal:      Palpations: Abdomen is soft.   Skin:     Comments: Fine petechial rash on BLEs. One small ~2mm scab on LLE. Small circular faintly red rash about 2-3cm in diameter with central clearing on posterior RLE above ankle    Neurological:      Comments: Moving all extremities and following commands when sedation was decreased        Medications  Current Facility-Administered Medications   Medication Dose Route Frequency Provider Last Rate Last Admin    potassium bicarbonate (KLYTE) effervescent tablet 25 mEq  25 mEq Enteral Tube Q4HRS Jennifer Pineda M.D.   25 mEq at 10/07/22 1009    furosemide (LASIX) injection 40 mg  40 mg Intravenous Q DAY Jennifer Pineda M.D.   40 mg at 10/07/22 1029    potassium chloride (KCL) ivpb 10 mEq  10 mEq Intravenous Q HOUR Jennifer Pineda M.D.        doxycycline monohydrate (ADOXA) tablet 100 mg  100 mg Enteral Tube Q12HRS Jennifer Pineda M.D.   100 mg at 10/07/22 0540    acetaminophen  (Tylenol) tablet 650 mg  650 mg Enteral Tube Q4HRS PRN Jennifer Pineda M.D.   650 mg at 10/07/22 0539    fentaNYL (SUBLIMAZE) injection 50 mcg  50 mcg Intravenous Q15 MIN PRN Mary Alice L. Latona        And    fentaNYL (SUBLIMAZE) injection 100 mcg  100 mcg Intravenous Q15 MIN PRN Mary Alice L. Latona   100 mcg at 10/05/22 0344    And    fentaNYL (SUBLIMAZE) 50 mcg/mL in 50mL (Continuous Infusion)   Intravenous Continuous Mary Alice L. Latona   Paused at 10/07/22 0559    propofol (DIPRIVAN) injection  0-80 mcg/kg/min Intravenous Continuous Mary Alice L. Latona   Paused at 10/07/22 0558    MD Alert...ICU Electrolyte Replacement per Pharmacy   Other PHARMACY TO DOSE Jennifer Pineda M.D.        Pharmacy Consult: Enteral tube insertion - review meds/change route/product selection  1 Each Other PHARMACY TO DOSE Jennifer Pineda M.D.        famotidine (PEPCID) tablet 20 mg  20 mg Enteral Tube Q12HRS Jennifer Pineda M.D.   20 mg at 10/06/22 1749    Or    famotidine (PEPCID) injection 20 mg  20 mg Intravenous Q12HRS Jennifer Pineda M.D.   20 mg at 10/07/22 0539    senna-docusate (PERICOLACE or SENOKOT S) 8.6-50 MG per tablet 2 Tablet  2 Tablet Enteral Tube BID Jennifer Pineda M.D.   2 Tablet at 10/07/22 0540    And    polyethylene glycol/lytes (MIRALAX) PACKET 1 Packet  1 Packet Enteral Tube QDAY PRN Jennifer Pineda M.D.   1 Packet at 10/07/22 0539    And    magnesium hydroxide (MILK OF MAGNESIA) suspension 30 mL  30 mL Enteral Tube QDAY PRN Jennifer Pineda M.D.        And    bisacodyl (DULCOLAX) suppository 10 mg  10 mg Rectal QDAY PRN Jennifer Pineda M.D.        norepinephrine (Levophed) 8 mg in 250 mL NS infusion (premix)  0-30 mcg/min Intravenous Continuous Jennifer Pineda M.D.   Stopped at 10/07/22 0429    [Held by provider] enoxaparin (Lovenox) inj 40 mg  40 mg Subcutaneous DAILY AT 1800 Jennifer Pineda M.D.   40 mg at 10/05/22 1718    cefTRIAXone (Rocephin) syringe 2 g  2 g Intravenous BID  Jennifer Pineda M.D.   2 g at 10/07/22 0539       Fluids    Intake/Output Summary (Last 24 hours) at 10/7/2022 1108  Last data filed at 10/7/2022 1000  Gross per 24 hour   Intake 2660.26 ml   Output 2800 ml   Net -139.74 ml       Laboratory  Recent Labs     10/04/22  1604 10/05/22  0434   ISTATAPH 7.533* 7.433   ISTATAPCO2 35.7 44.3*   ISTATAPO2 71 77   ISTATATCO2 31 31   UAKHUCT4CMO 96 96   ISTATARTHCO3 30.0* 29.6*   ISTATARTBE 7* 5*   ISTATTEMP 99.7 F 102.0 F   ISTATFIO2 50 50   ISTATSPEC Arterial Arterial   ISTATAPHTC 7.524* 7.405   BBSWCDVF1HL 74 88*     Recent Labs     10/05/22  0425 10/05/22  0945   CPKTOTAL 1127* 1370*     Recent Labs     10/05/22  0425 10/06/22  0601 10/07/22  0424   SODIUM 144 142 142   POTASSIUM 4.2 3.0* 3.2*   CHLORIDE 108 108 106   CO2 29 25 27   BUN 39* 29* 21   CREATININE 1.05 0.69 0.73   MAGNESIUM 2.8* 2.6* 2.4   PHOSPHORUS 2.6 1.9* 3.1   CALCIUM 7.4* 7.3* 7.4*     Recent Labs     10/05/22  0425 10/06/22  0601 10/07/22  0424   ALTSGPT 68* 76* 96*   ASTSGOT 52* 79* 84*   ALKPHOSPHAT 58 48 56   TBILIRUBIN 0.3 0.4 0.6   PREALBUMIN  --  11.6*  --    GLUCOSE 168* 133* 109*     Recent Labs     10/05/22  0425 10/05/22  0630 10/06/22  0601 10/07/22  0424   WBC  --  10.3 7.8 7.7   NEUTSPOLYS  --  43.00* 45.90 51.40   LYMPHOCYTES  --  43.90* 36.60 34.80   MONOCYTES  --  9.60 12.30 7.20   EOSINOPHILS  --  0.00 3.50 4.50   BASOPHILS  --  0.00 0.40 0.40   ASTSGOT 52*  --  79* 84*   ALTSGPT 68*  --  76* 96*   ALKPHOSPHAT 58  --  48 56   TBILIRUBIN 0.3  --  0.4 0.6     Recent Labs     10/05/22  0630 10/06/22  0601 10/06/22  1331 10/07/22  0424   RBC 4.57* 4.06*  --  4.14*   HEMOGLOBIN 11.8* 10.6*  --  10.9*   HEMATOCRIT 38.0* 33.3*  --  33.7*   PLATELETCT 220 223  --  200   PROTHROMBTM  --   --  14.5  --    INR  --   --  1.14*  --        Imaging  Reviewed    Assessment/Plan   Seizures  Altered mental status  Unclear etiology as witnessed tonic-clonic seizure at OSH -concern for  meningitis/encephalitis.  He was noted to be progressively more confused during his hospital stay at OSH. CT head at OSH on 10/2 was unremarkable but this was prior to seizure.  CT head 10/4 negative.  LP studies in outside hospital were send out labs-unclear primary might have any results  -Repeat LP today  -SATs  -Ativan as needed seizures  -Infectious work-up as noted below  -We will get LP studies from outside hospital  -Antibiotics as noted below     Sepsis  Petechial rash  Transaminitis  Unclear etiology of patient's petechial rash with the symptoms fever, chills, abdominal pain/bloating and headache -concerning for meningitis as noted above.  He did have a tick bite on left lower extremity which is concerning for tickborne diseases. HIV negative.  So far, LP at OSH with elevated protein of 57, normal glucose of 78-remaining studies pending  Fungitell, crypto, galactomannan, RVP neg, urine strep/legionella  -We will get LP studies, blood and sputum culture results from outside hospital  -Repeat blood cultures, respiratory culture, UA and urine culture, respiratory viral panel  -Follow-up cocci, Lyme PCR and antibodies, Rickettsia antibodies  -Per ID, discontinue vancomycin and ampicillin   -Plan for 14 days of ceftriaxone and doxycycline to finish a course of antibiotics for meningitis and/or atypical infection -attempted to repeat bedside LP on 10/6 without any success and since patient's mental status did improve significantly decided not to pursue repeat LP with IR.   -Appreciate ID recs -no need for acyclovir at this time  -Follow-up ANCA     Acute respiratory failure  Patient was noted to have bilateral pulmonary edema at outside hospital with normal TTE and BNP.  He was initially on BiPAP and high flow nasal cannula and then intubated after seizures.  CT chest with bilateral pleural effusions and lower lobe consolidations.  -Lung protective ventilation -wean as tolerated  -Daily SAT/SBT  -Antibiotics as  noted above  -Lasix 40mg PRN    Atrial fibrillation  -Hold metoprolol since he intermittently requires Levophed -can restart once stable  -Restart Xarelto    VTE:  NOAC  Ulcer: PPI  Lines:  reviewed     I have performed a physical exam and reviewed and updated ROS and Plan today (10/7/2022). In review of yesterday's note (10/6/2022), there are no changes except as documented above.     Discussed patient condition and risk of morbidity and/or mortality with Family, RN, RT, and Pharmacy  The patient remains critically ill.  Critical care time = 35 minutes in directly providing and coordinating critical care and extensive data review.  No time overlap and excludes procedures.        Jennifer Pineda MD  Pulmonary and Critical Care Medicine  [facility]

## 2022-10-08 PROBLEM — R41.82 ALTERED MENTAL STATUS: Status: ACTIVE | Noted: 2022-10-08

## 2022-10-08 PROBLEM — R74.8 ELEVATED LIVER ENZYMES: Status: ACTIVE | Noted: 2022-10-08

## 2022-10-08 PROBLEM — J90 PLEURAL EFFUSION, BILATERAL: Status: ACTIVE | Noted: 2022-10-08

## 2022-10-08 PROBLEM — R23.3 PETECHIAL RASH: Status: ACTIVE | Noted: 2022-10-08

## 2022-10-08 LAB
ALBUMIN SERPL BCP-MCNC: 2.8 G/DL (ref 3.2–4.9)
ALBUMIN/GLOB SERPL: 0.8 G/DL
ALP SERPL-CCNC: 73 U/L (ref 30–99)
ALT SERPL-CCNC: 134 U/L (ref 2–50)
ANION GAP SERPL CALC-SCNC: 7 MMOL/L (ref 7–16)
AST SERPL-CCNC: 103 U/L (ref 12–45)
BASOPHILS # BLD AUTO: 0 % (ref 0–1.8)
BASOPHILS # BLD: 0 K/UL (ref 0–0.12)
BILIRUB SERPL-MCNC: 0.5 MG/DL (ref 0.1–1.5)
BUN SERPL-MCNC: 21 MG/DL (ref 8–22)
CALCIUM SERPL-MCNC: 7.8 MG/DL (ref 8.5–10.5)
CHLORIDE SERPL-SCNC: 103 MMOL/L (ref 96–112)
CO2 SERPL-SCNC: 30 MMOL/L (ref 20–33)
CREAT SERPL-MCNC: 0.64 MG/DL (ref 0.5–1.4)
EOSINOPHIL # BLD AUTO: 0.14 K/UL (ref 0–0.51)
EOSINOPHIL NFR BLD: 1 % (ref 0–6.9)
ERYTHROCYTE [DISTWIDTH] IN BLOOD BY AUTOMATED COUNT: 46 FL (ref 35.9–50)
GFR SERPLBLD CREATININE-BSD FMLA CKD-EPI: 112 ML/MIN/1.73 M 2
GLOBULIN SER CALC-MCNC: 3.7 G/DL (ref 1.9–3.5)
GLUCOSE SERPL-MCNC: 109 MG/DL (ref 65–99)
HCT VFR BLD AUTO: 39.8 % (ref 42–52)
HGB BLD-MCNC: 12.5 G/DL (ref 14–18)
LV EJECT FRACT  99904: 55
LV EJECT FRACT MOD 2C 99903: 44.29
LV EJECT FRACT MOD 4C 99902: 56.24
LV EJECT FRACT MOD BP 99901: 51.41
LYMPHOCYTES # BLD AUTO: 2.74 K/UL (ref 1–4.8)
LYMPHOCYTES NFR BLD: 19 % (ref 22–41)
MAGNESIUM SERPL-MCNC: 2.6 MG/DL (ref 1.5–2.5)
MANUAL DIFF BLD: NORMAL
MCH RBC QN AUTO: 26.4 PG (ref 27–33)
MCHC RBC AUTO-ENTMCNC: 31.4 G/DL (ref 33.7–35.3)
MCV RBC AUTO: 84.1 FL (ref 81.4–97.8)
MONOCYTES # BLD AUTO: 0.58 K/UL (ref 0–0.85)
MONOCYTES NFR BLD AUTO: 4 % (ref 0–13.4)
MORPHOLOGY BLD-IMP: NORMAL
MYELOCYTES NFR BLD MANUAL: 1 %
NEUTROPHILS # BLD AUTO: 10.8 K/UL (ref 1.82–7.42)
NEUTROPHILS NFR BLD: 75 % (ref 44–72)
NRBC # BLD AUTO: 0 K/UL
NRBC BLD-RTO: 0 /100 WBC
PHOSPHATE SERPL-MCNC: 3.7 MG/DL (ref 2.5–4.5)
PLATELET # BLD AUTO: 227 K/UL (ref 164–446)
PMV BLD AUTO: 10.1 FL (ref 9–12.9)
POTASSIUM SERPL-SCNC: 4 MMOL/L (ref 3.6–5.5)
PROT SERPL-MCNC: 6.5 G/DL (ref 6–8.2)
RBC # BLD AUTO: 4.73 M/UL (ref 4.7–6.1)
RBC BLD AUTO: NORMAL
SODIUM SERPL-SCNC: 140 MMOL/L (ref 135–145)
WBC # BLD AUTO: 14.4 K/UL (ref 4.8–10.8)

## 2022-10-08 PROCEDURE — 770020 HCHG ROOM/CARE - TELE (206)

## 2022-10-08 PROCEDURE — 84100 ASSAY OF PHOSPHORUS: CPT

## 2022-10-08 PROCEDURE — 93306 TTE W/DOPPLER COMPLETE: CPT | Mod: 26 | Performed by: INTERNAL MEDICINE

## 2022-10-08 PROCEDURE — 85025 COMPLETE CBC W/AUTO DIFF WBC: CPT

## 2022-10-08 PROCEDURE — A9270 NON-COVERED ITEM OR SERVICE: HCPCS | Performed by: STUDENT IN AN ORGANIZED HEALTH CARE EDUCATION/TRAINING PROGRAM

## 2022-10-08 PROCEDURE — 85007 BL SMEAR W/DIFF WBC COUNT: CPT

## 2022-10-08 PROCEDURE — 700111 HCHG RX REV CODE 636 W/ 250 OVERRIDE (IP): Performed by: STUDENT IN AN ORGANIZED HEALTH CARE EDUCATION/TRAINING PROGRAM

## 2022-10-08 PROCEDURE — 700102 HCHG RX REV CODE 250 W/ 637 OVERRIDE(OP): Performed by: STUDENT IN AN ORGANIZED HEALTH CARE EDUCATION/TRAINING PROGRAM

## 2022-10-08 PROCEDURE — 700111 HCHG RX REV CODE 636 W/ 250 OVERRIDE (IP): Performed by: HOSPITALIST

## 2022-10-08 PROCEDURE — 99223 1ST HOSP IP/OBS HIGH 75: CPT | Performed by: HOSPITALIST

## 2022-10-08 PROCEDURE — 80053 COMPREHEN METABOLIC PANEL: CPT

## 2022-10-08 PROCEDURE — 83735 ASSAY OF MAGNESIUM: CPT

## 2022-10-08 PROCEDURE — 94660 CPAP INITIATION&MGMT: CPT

## 2022-10-08 RX ORDER — ENOXAPARIN SODIUM 100 MG/ML
40 INJECTION SUBCUTANEOUS DAILY
Status: DISCONTINUED | OUTPATIENT
Start: 2022-10-08 | End: 2022-10-11

## 2022-10-08 RX ADMIN — SENNOSIDES AND DOCUSATE SODIUM 2 TABLET: 50; 8.6 TABLET ORAL at 06:12

## 2022-10-08 RX ADMIN — ACETAMINOPHEN 650 MG: 325 TABLET, FILM COATED ORAL at 02:55

## 2022-10-08 RX ADMIN — LINEZOLID 600 MG: 600 TABLET, FILM COATED ORAL at 06:13

## 2022-10-08 RX ADMIN — DOXYCYCLINE 100 MG: 100 TABLET, FILM COATED ORAL at 18:26

## 2022-10-08 RX ADMIN — LINEZOLID 600 MG: 600 TABLET, FILM COATED ORAL at 17:27

## 2022-10-08 RX ADMIN — ENOXAPARIN SODIUM 40 MG: 40 INJECTION SUBCUTANEOUS at 17:27

## 2022-10-08 RX ADMIN — ACETAMINOPHEN 650 MG: 325 TABLET, FILM COATED ORAL at 10:33

## 2022-10-08 RX ADMIN — FUROSEMIDE 40 MG: 10 INJECTION, SOLUTION INTRAMUSCULAR; INTRAVENOUS at 06:13

## 2022-10-08 RX ADMIN — SENNOSIDES AND DOCUSATE SODIUM 2 TABLET: 50; 8.6 TABLET ORAL at 17:27

## 2022-10-08 RX ADMIN — DOXYCYCLINE 100 MG: 100 TABLET, FILM COATED ORAL at 06:13

## 2022-10-08 RX ADMIN — ONDANSETRON 4 MG: 2 INJECTION INTRAMUSCULAR; INTRAVENOUS at 09:26

## 2022-10-08 ASSESSMENT — CHA2DS2 SCORE
HYPERTENSION: NO
AGE 65 TO 74: NO
VASCULAR DISEASE: NO
CHF OR LEFT VENTRICULAR DYSFUNCTION: NO
SEX: MALE
AGE 75 OR GREATER: NO
CHA2DS2 VASC SCORE: 0
DIABETES: NO
PRIOR STROKE OR TIA OR THROMBOEMBOLISM: NO

## 2022-10-08 ASSESSMENT — PAIN DESCRIPTION - PAIN TYPE
TYPE: ACUTE PAIN

## 2022-10-08 ASSESSMENT — FIBROSIS 4 INDEX
FIB4 SCORE: 2.16
FIB4 SCORE: 2.16

## 2022-10-08 NOTE — CARE PLAN
Problem: Knowledge Deficit - Standard  Goal: Patient and family/care givers will demonstrate understanding of plan of care, disease process/condition, diagnostic tests and medications  Outcome: Progressing     Problem: Pain - Standard  Goal: Alleviation of pain or a reduction in pain to the patient’s comfort goal  Outcome: Progressing     Problem: Skin Integrity  Goal: Skin integrity is maintained or improved  Outcome: Progressing     Problem: Fall Risk  Goal: Patient will remain free from falls  Outcome: Progressing     Problem: Safety - Medical Restraint  Goal: Remains free of injury from restraints (Restraint for Interference with Medical Device)  Outcome: Progressing  Goal: Free from restraint(s) (Restraint for Interference with Medical Device)  Outcome: Progressing   The patient is Watcher - Medium risk of patient condition declining or worsening    Shift Goals  Clinical Goals: decrease O2 demand  Patient Goals: rest overnight  Family Goals: updates    Progress made toward(s) clinical / shift goals:      Patient is not progressing towards the following goals:

## 2022-10-08 NOTE — ASSESSMENT & PLAN NOTE
Initial concern of potential CSF infection     Evaluated by ID with recommendations to complete 2 weeks course of doxycycline given recent tick bite with rash    MRI brain normal  Formal neurology consult on 10/15/2022 with recommendation to repeat LP and initiate high-dose Solu-Medrol 1 g daily for 5 days    Discussed with ID  Follow-up on pending CSF  Overall significant improvement since started IV steroids  If clinically improved tomorrow consider discharge to rehab to complete steroid course  10/18: Patient continuing to improve.  No longer qualifies for rehab.  Will finish last dose of Solu-Medrol tomorrow and then discharge home with doxycycline.

## 2022-10-08 NOTE — PROGRESS NOTES
Pt transferred to T611, passed swallow eval with no difficulties. Approx 30 minutes later, pt had 1 episode of emesis with accidental self removal of cortrak.  updated of lack of NG access. Diet order placed. Pharmacy alerted to Med route transition to PO.

## 2022-10-08 NOTE — CONSULTS
Hospital Medicine Consultation    Date of Service  10/8/2022    Referring Physician  Josh Rodriguez M.D.    Consulting Physician  Dylan Lopez D.O.    Reason for Consultation  Transfer of care out of ICU.    Chief Complaint:  Headache, fever, shotness of breath    History of Presenting Illness  Josh Trivedi is a 57 y.o. male with a history of obesity, sleep apnea, paroxysmal atrial fibrillation on Xarelto who presented 10/4/2022 with transfer from [facility] on 10/2 for worsening shortness of breath fevers and headache.  Upon admission he had been intubated and sedated due to witnessed seizure on 10/4 with altered mental status and hypoxia.    Patient is from [place] and approximately 2 weeks prior to admission had been bitten by a tick on the left lower extremity.  He had a rash of his bilateral legs associated with fever and headaches.  He went to an urgent care and was diagnosed with prostatitis and given antibiotics but he did not improve.  He went to the emergency room on 9/30 for fevers chills shortness of breath and headache.  Patient was discharged but presented again on 10/2 which led to current hospitalization.  At outside hospital he was worked up for potential tickborne illness including Lyme disease, [place] spotted fever, and meningitis.  Lumbar puncture obtained and CSF cultures, and protein (elevated 57), cell count and normal CSF glucose.  He has had elevated liver enzymes.  Patient was negative for COVID, HIV.  Infectious diseases been consulting and has concern of [place] spotted fever.  Serologies have been pending    10/8: Patient seen with his wife and mother at bedside.  Patient is awake but remains altered and somewhat confused.  Wife states this is quite off of his normal mentation.  Patient is eating his breakfast.  He was able to tell me his wife's name and mother's name.  Intermittently answers questions.  He was unable to tell me what city he was in or where  he was at.  Updates given to patient's wife.    Review of Systems  Review of Systems   Unable to perform ROS: Mental acuity     Past Medical History  Sleep apnea  Paroxysmal atrial fibrillation    Surgical History   has no past surgical history on file.    Family History  Mother alive.    Social History   reports that he has never smoked. He has never used smokeless tobacco. He reports that he does not drink alcohol and does not use drugs.  He is  he has 5 children.  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    Medications  Current Facility-Administered Medications   Medication Dose Route Frequency Provider Last Rate Last Admin    enoxaparin (Lovenox) inj 40 mg  40 mg Subcutaneous DAILY AT 1800 Dylan Lopez D.O.        furosemide (LASIX) injection 40 mg  40 mg Intravenous Q DAY Jennifer Pineda M.D.   40 mg at 10/08/22 0613    doxycycline monohydrate (ADOXA) tablet 100 mg  100 mg Oral Q12HRS Jennifer Pineda M.D.   100 mg at 10/08/22 0613    linezolid (ZYVOX) tablet 600 mg  600 mg Oral Q12HRS Jennifer Pineda M.D.   600 mg at 10/08/22 0613    senna-docusate (PERICOLACE or SENOKOT S) 8.6-50 MG per tablet 2 Tablet  2 Tablet Oral BID Jennifer Pineda M.D.   2 Tablet at 10/08/22 0612    And    polyethylene glycol/lytes (MIRALAX) PACKET 1 Packet  1 Packet Oral QDAY PRN Jennifer Pineda M.D.        And    magnesium hydroxide (MILK OF MAGNESIA) suspension 30 mL  30 mL Oral QDAY PRN Jennifer Pineda M.D.        And    bisacodyl (DULCOLAX) suppository 10 mg  10 mg Rectal QDAY PRN Jennifer Pineda M.D.        acetaminophen (Tylenol) tablet 650 mg  650 mg Oral Q4HRS PRN Jennifer Pineda M.D.   650 mg at 10/08/22 1033    ondansetron (ZOFRAN) syringe/vial injection 4 mg  4 mg Intravenous Q6HRS PRN Ruben Staples M.D.   4 mg at 10/08/22 0926    MD Alert...ICU Electrolyte Replacement per Pharmacy   Other PHARMACY TO DOSE Jennifer Pineda M.D.        norepinephrine  (Levophed) 8 mg in 250 mL NS infusion (premix)  0-30 mcg/min Intravenous Continuous Jennifer Pineda M.D.   Stopped at 10/07/22 0429       Allergies  Allergies   Allergen Reactions    Penicillins Rash     Rash as a child; tolerated ampicillin & ceftriaxone Oct 2022       Physical Exam  Temp:  [3.3 °C (37.9 °F)-38.3 °C (100.9 °F)] 36.7 °C (98.1 °F)  Pulse:  [80-93] 84  Resp:  [14-25] 18  BP: ()/(62-91) 130/89  SpO2:  [92 %-98 %] 92 %    Physical Exam  Vitals reviewed.   Constitutional:       Appearance: Normal appearance. He is obese. He is not diaphoretic.   HENT:      Head: Normocephalic and atraumatic.      Nose: Nose normal.      Mouth/Throat:      Mouth: Mucous membranes are moist.      Pharynx: No oropharyngeal exudate.   Eyes:      General: No scleral icterus.        Right eye: No discharge.         Left eye: No discharge.      Extraocular Movements: Extraocular movements intact.      Conjunctiva/sclera: Conjunctivae normal.      Pupils: Pupils are equal, round, and reactive to light.   Neck:      Comments: Noted soft mobile subcutaneous mass approximately 2 cm at the base of his right posterior neck likely lipoma.  Cardiovascular:      Rate and Rhythm: Normal rate and regular rhythm.      Pulses:           Radial pulses are 2+ on the right side and 2+ on the left side.        Dorsalis pedis pulses are 2+ on the right side and 2+ on the left side.      Heart sounds: No murmur heard.  Pulmonary:      Effort: Pulmonary effort is normal. No respiratory distress.      Breath sounds: Normal breath sounds. No wheezing or rales.   Abdominal:      General: Bowel sounds are normal. There is no distension.      Palpations: Abdomen is soft.      Tenderness: There is no abdominal tenderness.   Musculoskeletal:         General: No swelling or tenderness.      Cervical back: No tenderness. No muscular tenderness.      Right lower leg: No edema.      Left lower leg: No edema.   Lymphadenopathy:      Cervical: No  cervical adenopathy.   Skin:     Coloration: Skin is not jaundiced or pale.   Neurological:      Mental Status: He is alert.      Cranial Nerves: No cranial nerve deficit.      Sensory: No sensory deficit.   Psychiatric:         Mood and Affect: Mood normal. Affect is flat.         Speech: Speech is delayed.         Behavior: Behavior is slowed.         Cognition and Memory: Cognition is impaired.       Fluids      Laboratory  Recent Labs     10/06/22  0601 10/07/22  0424 10/08/22  0315   WBC 7.8 7.7 14.4*   RBC 4.06* 4.14* 4.73   HEMOGLOBIN 10.6* 10.9* 12.5*   HEMATOCRIT 33.3* 33.7* 39.8*   MCV 82.0 81.4 84.1   MCH 26.1* 26.3* 26.4*   MCHC 31.8* 32.3* 31.4*   RDW 43.8 43.5 46.0   PLATELETCT 223 200 227   MPV 9.6 9.6 10.1     Recent Labs     10/06/22  0601 10/07/22  0424 10/08/22  0315   SODIUM 142 142 140   POTASSIUM 3.0* 3.2* 4.0   CHLORIDE 108 106 103   CO2 25 27 30   GLUCOSE 133* 109* 109*   BUN 29* 21 21   CREATININE 0.69 0.73 0.64   CALCIUM 7.3* 7.4* 7.8*     Recent Labs     10/06/22  1331   INR 1.14*          Recent Labs     10/07/22  0424   TRIGLYCERIDE 310*        Imaging  EC-ECHOCARDIOGRAM COMPLETE W/O CONT   Final Result      CT-CTA CHEST PULMONARY ARTERY W/ RECONS   Final Result         1.  No pulmonary embolus appreciated.   2.  Moderate bilateral pleural effusions. Associated linear consolidations in the lung bases suggests atelectasis, component of infiltrate not excluded.   3.  Low-density left hepatic lobe lesion, could represent small cyst or hemangioma, otherwise indeterminate. Could be followed up with three-phase CT liver for further characterization.      DX-ABDOMEN FOR TUBE PLACEMENT   Final Result      1.  Enteric tube overlies the proximal stomach.      CT-HEAD W/O   Final Result      Negative noncontrast CT scan of the head / brain.         DX-CHEST-PORTABLE (1 VIEW)   Final Result      1.  Satisfactory lines and tubes as detailed.   2.  Diffuse bilateral pulmonary opacities are  nonspecific, may be related to extensive pulmonary edema or infection.   3.  There may be layering small pleural effusions. No pneumothorax.          Assessment/Plan  * Seizure (HCC)- (present on admission)  Assessment & Plan  Concern of potential CSF infection meningeal irritation  Infectious disease did consult  Continue antibiotics  Neurochecks    Elevated liver enzymes  Assessment & Plan  Monitor cmp  Of unclear etiology but suspect infectious    Petechial rash  Assessment & Plan  Resolving per wife.  Rash faint on lower legs.    Pleural effusion, bilateral  Assessment & Plan  Await echocardiogram  Effusions notable on CT chest  Lasix as tolerates  Supplemental oxygen.    Altered mental status  Assessment & Plan  Acute encephalopathy of unclear etiology  infectious etiology from tick bite versus autoimmune issue versus seizure?  Infectious disease working up infectious differential.  Part of the work-up is at outside hospital believe that his Fatemeh will need follow-up.  Patient has been on empiric antibiotics.  Patient has been having neurologic improvement although still altered per family.  CT head unremarkable.  Had LP at outside hospital.  ESR normal, CRP elevated  PT/OT    Tick bite of left lower leg  Assessment & Plan  Concern of possible [place] spotted fever  Await serologies and CSF studies    Paroxysmal A-fib ([facility])  Assessment & Plan  CHADSVasc score:1  Holding anticoagulation for now.  Okay for Lovenox for DVT prophylaxis.  10/7 Echo:  CONCLUSIONS  No prior study is available for comparison.   The left ventricular ejection fraction is visually estimated to be 55%.  Unable to estimate right ventricular systolic pressure due to an   inadequate tricuspid regurgitant jet.    Acute respiratory failure with hypoxia (HCC)  Assessment & Plan  Extubated 10/7  Supplemental oxygen keep SPO2 greater than 89%  Encourage deep inspiratory efforts  Mobilize as able  RT per protocol

## 2022-10-08 NOTE — PROGRESS NOTES
4 Eyes Skin Assessment Completed by DEONDRE Philippe and DEONDRE Carlson.    Head WDL  Ears WDL  Nose WDL  Mouth WDL  Neck Redness and Blanching  Breast/Chest Redness and Blanching, moles  Shoulder Blades Redness and Blanching  Spine Redness and Blanching  (R) Arm/Elbow/Hand Redness and Blanching  (L) Arm/Elbow/Hand Redness and Blanching  Abdomen moles   Groin WDL  Scrotum/Coccyx/Buttocks Redness and Blanching  (R) Leg Scar  (L) Leg WDL  (R) Heel/Foot/Toe WDL  (L) Heel/Foot/Toe WDL          Devices In Places Tele Box, Pulse Ox, and SCD's      Interventions In Place Gray Ear Foams, Pillows, Q2 Turns, and Pressure Redistribution Mattress    Possible Skin Injury No    Pictures Uploaded Into Epic N/A  Wound Consult Placed N/A  RN Wound Prevention Protocol Ordered No

## 2022-10-08 NOTE — PROGRESS NOTES
4 Eyes Skin Assessment Completed by heidi RN and DEONDRE johnson.    Head WDL  Ears Blanching  Nose WDL  Mouth WDL  Neck WDL  Breast/Chest Rash Scattered Petechiae   Shoulder Blades WDL  Spine WDL  (R) Arm/Elbow/Hand Rash  (L) Arm/Elbow/Hand Scar  Abdomen Rash  Groin WDL  Scrotum/Coccyx/Buttocks Redness and Blanching  (R) Leg WDL  (L) Leg WDL  (R) Heel/Foot/Toe Redness and Blanching  (L) Heel/Foot/Toe Redness and Blanching          Devices In Places ECG, Blood Pressure Cuff, Pulse Ox, Irene, Central Line, and Nasal Cannula      Interventions In Place Gray Ear Foams, TAP System, Q2 Turns, and Pressure Redistribution Mattress    Possible Skin Injury No    Pictures Uploaded Into Epic N/A  Wound Consult Placed N/A  RN Wound Prevention Protocol Ordered No

## 2022-10-08 NOTE — ASSESSMENT & PLAN NOTE
Lower extremity rash resolved    Serologies were negative  Lyme PCR negative    ID recommended completing 2 weeks course of doxycycline

## 2022-10-08 NOTE — ASSESSMENT & PLAN NOTE
acute encephalopathy    Empirically treated with antibiotics given recent tick infection  Discussed with ID LP done at outside facility not consistent with meningitis  MRI with and without contrast negative  Appreciate neurology input follow-up on repeat LP results  Neurology recommending empiric treatment with Solu-Medrol 1 g daily for 5 days  Continue to monitor and avoid sedating agents

## 2022-10-09 LAB
ALBUMIN SERPL BCP-MCNC: 2.3 G/DL (ref 3.2–4.9)
ALBUMIN/GLOB SERPL: 0.6 G/DL
ALP SERPL-CCNC: 65 U/L (ref 30–99)
ALT SERPL-CCNC: 101 U/L (ref 2–50)
ANION GAP SERPL CALC-SCNC: 8 MMOL/L (ref 7–16)
AST SERPL-CCNC: 50 U/L (ref 12–45)
BACTERIA BLD CULT: NORMAL
BACTERIA BLD CULT: NORMAL
BASOPHILS # BLD AUTO: 0 % (ref 0–1.8)
BASOPHILS # BLD: 0 K/UL (ref 0–0.12)
BILIRUB SERPL-MCNC: 0.4 MG/DL (ref 0.1–1.5)
BUN SERPL-MCNC: 19 MG/DL (ref 8–22)
CALCIUM SERPL-MCNC: 7.9 MG/DL (ref 8.5–10.5)
CHLORIDE SERPL-SCNC: 101 MMOL/L (ref 96–112)
CO2 SERPL-SCNC: 28 MMOL/L (ref 20–33)
CREAT SERPL-MCNC: 0.63 MG/DL (ref 0.5–1.4)
EOSINOPHIL # BLD AUTO: 0.33 K/UL (ref 0–0.51)
EOSINOPHIL NFR BLD: 2.6 % (ref 0–6.9)
ERYTHROCYTE [DISTWIDTH] IN BLOOD BY AUTOMATED COUNT: 45.3 FL (ref 35.9–50)
GFR SERPLBLD CREATININE-BSD FMLA CKD-EPI: 112 ML/MIN/1.73 M 2
GLOBULIN SER CALC-MCNC: 3.6 G/DL (ref 1.9–3.5)
GLUCOSE SERPL-MCNC: 118 MG/DL (ref 65–99)
HCT VFR BLD AUTO: 39 % (ref 42–52)
HGB BLD-MCNC: 11.9 G/DL (ref 14–18)
LYMPHOCYTES # BLD AUTO: 5.11 K/UL (ref 1–4.8)
LYMPHOCYTES NFR BLD: 40.2 % (ref 22–41)
MAGNESIUM SERPL-MCNC: 2.5 MG/DL (ref 1.5–2.5)
MANUAL DIFF BLD: NORMAL
MCH RBC QN AUTO: 25.8 PG (ref 27–33)
MCHC RBC AUTO-ENTMCNC: 30.5 G/DL (ref 33.7–35.3)
MCV RBC AUTO: 84.6 FL (ref 81.4–97.8)
METAMYELOCYTES NFR BLD MANUAL: 0.8 %
MONOCYTES # BLD AUTO: 1.41 K/UL (ref 0–0.85)
MONOCYTES NFR BLD AUTO: 11.1 % (ref 0–13.4)
MORPHOLOGY BLD-IMP: NORMAL
NEUTROPHILS # BLD AUTO: 5.75 K/UL (ref 1.82–7.42)
NEUTROPHILS NFR BLD: 45.3 % (ref 44–72)
NRBC # BLD AUTO: 0 K/UL
NRBC BLD-RTO: 0 /100 WBC
PHOSPHATE SERPL-MCNC: 2.3 MG/DL (ref 2.5–4.5)
PLATELET # BLD AUTO: 209 K/UL (ref 164–446)
PLATELET BLD QL SMEAR: NORMAL
PMV BLD AUTO: 9.2 FL (ref 9–12.9)
POTASSIUM SERPL-SCNC: 4.3 MMOL/L (ref 3.6–5.5)
PROT SERPL-MCNC: 5.9 G/DL (ref 6–8.2)
R RICKETTSI IGG TITR SER IF: NORMAL {TITER}
R RICKETTSI IGM TITR SER IF: NORMAL {TITER}
RBC # BLD AUTO: 4.61 M/UL (ref 4.7–6.1)
RBC BLD AUTO: PRESENT
SIGNIFICANT IND 70042: NORMAL
SIGNIFICANT IND 70042: NORMAL
SITE SITE: NORMAL
SITE SITE: NORMAL
SMUDGE CELLS BLD QL SMEAR: NORMAL
SODIUM SERPL-SCNC: 137 MMOL/L (ref 135–145)
SOURCE SOURCE: NORMAL
SOURCE SOURCE: NORMAL
WBC # BLD AUTO: 12.7 K/UL (ref 4.8–10.8)

## 2022-10-09 PROCEDURE — 700102 HCHG RX REV CODE 250 W/ 637 OVERRIDE(OP): Performed by: STUDENT IN AN ORGANIZED HEALTH CARE EDUCATION/TRAINING PROGRAM

## 2022-10-09 PROCEDURE — 700111 HCHG RX REV CODE 636 W/ 250 OVERRIDE (IP): Performed by: STUDENT IN AN ORGANIZED HEALTH CARE EDUCATION/TRAINING PROGRAM

## 2022-10-09 PROCEDURE — 94660 CPAP INITIATION&MGMT: CPT

## 2022-10-09 PROCEDURE — 700111 HCHG RX REV CODE 636 W/ 250 OVERRIDE (IP): Performed by: HOSPITALIST

## 2022-10-09 PROCEDURE — 83735 ASSAY OF MAGNESIUM: CPT

## 2022-10-09 PROCEDURE — A9270 NON-COVERED ITEM OR SERVICE: HCPCS | Performed by: STUDENT IN AN ORGANIZED HEALTH CARE EDUCATION/TRAINING PROGRAM

## 2022-10-09 PROCEDURE — 84100 ASSAY OF PHOSPHORUS: CPT

## 2022-10-09 PROCEDURE — 36415 COLL VENOUS BLD VENIPUNCTURE: CPT

## 2022-10-09 PROCEDURE — 700102 HCHG RX REV CODE 250 W/ 637 OVERRIDE(OP): Performed by: INTERNAL MEDICINE

## 2022-10-09 PROCEDURE — 80053 COMPREHEN METABOLIC PANEL: CPT

## 2022-10-09 PROCEDURE — 770020 HCHG ROOM/CARE - TELE (206)

## 2022-10-09 PROCEDURE — 85007 BL SMEAR W/DIFF WBC COUNT: CPT

## 2022-10-09 PROCEDURE — A9270 NON-COVERED ITEM OR SERVICE: HCPCS | Performed by: INTERNAL MEDICINE

## 2022-10-09 PROCEDURE — 85025 COMPLETE CBC W/AUTO DIFF WBC: CPT

## 2022-10-09 PROCEDURE — 99233 SBSQ HOSP IP/OBS HIGH 50: CPT | Performed by: INTERNAL MEDICINE

## 2022-10-09 RX ADMIN — LINEZOLID 600 MG: 600 TABLET, FILM COATED ORAL at 06:37

## 2022-10-09 RX ADMIN — ASPIRIN 81 MG: 81 TABLET, COATED ORAL at 16:56

## 2022-10-09 RX ADMIN — DOXYCYCLINE 100 MG: 100 TABLET, FILM COATED ORAL at 16:55

## 2022-10-09 RX ADMIN — DOXYCYCLINE 100 MG: 100 TABLET, FILM COATED ORAL at 06:37

## 2022-10-09 RX ADMIN — ENOXAPARIN SODIUM 40 MG: 40 INJECTION SUBCUTANEOUS at 16:56

## 2022-10-09 RX ADMIN — SENNOSIDES AND DOCUSATE SODIUM 2 TABLET: 50; 8.6 TABLET ORAL at 16:55

## 2022-10-09 RX ADMIN — LINEZOLID 600 MG: 600 TABLET, FILM COATED ORAL at 16:55

## 2022-10-09 RX ADMIN — FUROSEMIDE 40 MG: 10 INJECTION, SOLUTION INTRAMUSCULAR; INTRAVENOUS at 06:36

## 2022-10-09 RX ADMIN — POLYETHYLENE GLYCOL 3350 1 PACKET: 17 POWDER, FOR SOLUTION ORAL at 16:57

## 2022-10-09 ASSESSMENT — PAIN DESCRIPTION - PAIN TYPE: TYPE: ACUTE PAIN

## 2022-10-09 NOTE — PROGRESS NOTES
"Hospital Medicine Daily Progress Note    Date of Service  10/9/2022    Chief Complaint  Josh Trivedi is a 57 y.o. male admitted 10/4/2022 with No chief complaint on file.      Hospital Course  No notes on file  Josh Trivedi is a 57 y.o. male with a history of obesity, sleep apnea, paroxysmal atrial fibrillation on Xarelto who presented 10/4/2022 with transfer from [facility] on 10/2 for worsening shortness of breath fevers and headache.  Upon admission he had been intubated and sedated due to witnessed seizure on 10/4 with altered mental status and hypoxia.     Patient is from [place] and approximately 2 weeks prior to admission had been bitten by a tick on the left lower extremity.  He had a rash of his bilateral legs associated with fever and headaches.  He went to an urgent care and was diagnosed with prostatitis and given antibiotics but he did not improve.  He went to the emergency room on 9/30 for fevers chills shortness of breath and headache.  Patient was discharged but presented again on 10/2 which led to current hospitalization.  At outside hospital he was worked up for potential tickborne illness including Lyme disease, [place] spotted fever, and meningitis.  Lumbar puncture obtained and CSF cultures, and protein (elevated 57), cell count and normal CSF glucose.  He has had elevated liver enzymes.  Patient was negative for COVID, HIV.  Infectious diseases been consulting and has concern of [place] spotted fever.  Serologies have been pending     10/8: Patient seen with his wife and mother at bedside.  Patient is awake but remains altered and somewhat confused.  Wife states this is quite off of his normal mentation.  Patient is eating his breakfast.  He was able to tell me his wife's name and mother's name.  Intermittently answers questions.  He was unable to tell me what city he was in or where he was at.  Updates given to patient's wife.\"    Dr. Lopez  Interval Problem " Update  10/9. On O2, No new issues or complaints. Had several LPs but per patient, no fuither need; awaiting final ID Abx plan.     I have discussed this patient's plan of care and discharge plan at IDT rounds today with Case Management, Nursing, Nursing leadership, and other members of the IDT team.    Consultants/Specialty  Intensivist admitted  Hospitalist  ID      Code Status  Full Code    Disposition  Patient is not medically cleared for discharge.   Anticipate discharge to  D .  I have placed the appropriate orders for post-discharge needs.    Review of Systems  Review of Systems   Unable to perform ROS: Mental acuity      Physical Exam  Temp:  [36.2 °C (97.2 °F)-36.7 °C (98.1 °F)] 36.5 °C (97.7 °F)  Pulse:  [] 95  Resp:  [18-21] 18  BP: (116-143)/() 139/100  SpO2:  [92 %-94 %] 94 %    Physical Exam  Vitals and nursing note reviewed.   Constitutional:       Appearance: He is obese.   HENT:      Head: Normocephalic and atraumatic.      Right Ear: External ear normal.      Left Ear: External ear normal.      Nose: Nose normal.      Mouth/Throat:      Mouth: Mucous membranes are moist.   Eyes:      General: No scleral icterus.     Conjunctiva/sclera: Conjunctivae normal.   Cardiovascular:      Rate and Rhythm: Normal rate and regular rhythm.      Heart sounds: No murmur heard.    No friction rub. No gallop.   Pulmonary:      Effort: Pulmonary effort is normal.      Breath sounds: Normal breath sounds.   Abdominal:      General: Abdomen is flat. Bowel sounds are normal. There is no distension.      Palpations: Abdomen is soft.      Tenderness: There is no abdominal tenderness. There is no guarding.   Musculoskeletal:         General: Normal range of motion.      Cervical back: Normal range of motion and neck supple.   Skin:     General: Skin is warm.   Neurological:      Mental Status: He is alert and oriented to person, place, and time. Mental status is at baseline.      Motor: Weakness present.       Comments: SLowness of thought and speech   Psychiatric:         Mood and Affect: Mood normal.         Behavior: Behavior normal.         Thought Content: Thought content normal.         Judgment: Judgment normal.       Fluids    Intake/Output Summary (Last 24 hours) at 10/9/2022 0946  Last data filed at 10/9/2022 0325  Gross per 24 hour   Intake 240 ml   Output 825 ml   Net -585 ml       Laboratory  Recent Labs     10/07/22  0424 10/08/22  0315 10/09/22  0432   WBC 7.7 14.4* 12.7*   RBC 4.14* 4.73 4.61*   HEMOGLOBIN 10.9* 12.5* 11.9*   HEMATOCRIT 33.7* 39.8* 39.0*   MCV 81.4 84.1 84.6   MCH 26.3* 26.4* 25.8*   MCHC 32.3* 31.4* 30.5*   RDW 43.5 46.0 45.3   PLATELETCT 200 227 209   MPV 9.6 10.1 9.2     Recent Labs     10/07/22  0424 10/08/22  0315 10/09/22  0432   SODIUM 142 140 137   POTASSIUM 3.2* 4.0 4.3   CHLORIDE 106 103 101   CO2 27 30 28   GLUCOSE 109* 109* 118*   BUN 21 21 19   CREATININE 0.73 0.64 0.63   CALCIUM 7.4* 7.8* 7.9*     Recent Labs     10/06/22  1331   INR 1.14*         Recent Labs     10/07/22  0424   TRIGLYCERIDE 310*       Imaging  EC-ECHOCARDIOGRAM COMPLETE W/O CONT   Final Result      CT-CTA CHEST PULMONARY ARTERY W/ RECONS   Final Result         1.  No pulmonary embolus appreciated.   2.  Moderate bilateral pleural effusions. Associated linear consolidations in the lung bases suggests atelectasis, component of infiltrate not excluded.   3.  Low-density left hepatic lobe lesion, could represent small cyst or hemangioma, otherwise indeterminate. Could be followed up with three-phase CT liver for further characterization.      DX-ABDOMEN FOR TUBE PLACEMENT   Final Result      1.  Enteric tube overlies the proximal stomach.      CT-HEAD W/O   Final Result      Negative noncontrast CT scan of the head / brain.         DX-CHEST-PORTABLE (1 VIEW)   Final Result      1.  Satisfactory lines and tubes as detailed.   2.  Diffuse bilateral pulmonary opacities are nonspecific, may be related to extensive  pulmonary edema or infection.   3.  There may be layering small pleural effusions. No pneumothorax.           Assessment/Plan  * Seizure, possible meningitis/h/o tick bite, hypoxia/par Afib/anticoagulation (held) ([facility])- (present on admission)  Assessment & Plan  Concern of potential CSF infection meningeal irritation  Infectious disease did consult  Continue antibiotics  Neurochecks    Elevated liver enzymes  Assessment & Plan  Monitor cmp  Of unclear etiology but suspect infectious    Petechial rash  Assessment & Plan  Resolving per wife.  Rash faint on lower legs.    Pleural effusion, bilateral  Assessment & Plan  Await echocardiogram  Effusions notable on CT chest  Lasix as tolerates  Supplemental oxygen.    Altered mental status  Assessment & Plan  Acute encephalopathy of unclear etiology  infectious etiology from tick bite versus autoimmune issue versus seizure?  Infectious disease working up infectious differential.  Part of the work-up is at outside hospital believe that his Fatemeh will need follow-up.  Patient has been on empiric antibiotics.  Patient has been having neurologic improvement although still altered per family.  CT head unremarkable.  Had LP at outside hospital.  ESR normal, CRP elevated  PT/OT    Tick bite of left lower leg  Assessment & Plan  Concern of possible [place] spotted fever  Await serologies and CSF studies    Paroxysmal A-fib ([facility])  Assessment & Plan  CHADSVasc score:1  Holding anticoagulation for now.  Okay for Lovenox for DVT prophylaxis.  10/7 Echo:  CONCLUSIONS  No prior study is available for comparison.   The left ventricular ejection fraction is visually estimated to be 55%.  Unable to estimate right ventricular systolic pressure due to an   inadequate tricuspid regurgitant jet.    Acute respiratory failure with hypoxia ([facility])  Assessment & Plan  Extubated 10/7  Supplemental oxygen keep SPO2 greater than 89%  Encourage deep inspiratory efforts  Mobilize as able  RT  per protocol       VTE prophylaxis: enoxaparin ppx    I have performed a physical exam and reviewed and updated ROS and Plan today (10/9/2022). In review of yesterday's note (10/8/2022), there are no changes except as documented above.

## 2022-10-10 LAB
ALBUMIN SERPL BCP-MCNC: 2.8 G/DL (ref 3.2–4.9)
ALBUMIN/GLOB SERPL: 0.7 G/DL
ALP SERPL-CCNC: 74 U/L (ref 30–99)
ALT SERPL-CCNC: 90 U/L (ref 2–50)
ANION GAP SERPL CALC-SCNC: 8 MMOL/L (ref 7–16)
AST SERPL-CCNC: 41 U/L (ref 12–45)
B BURGDOR AB SER IA-ACNC: 0.68 LIV (ref 0–1.2)
BASOPHILS # BLD AUTO: 0 % (ref 0–1.8)
BASOPHILS # BLD: 0 K/UL (ref 0–0.12)
BILIRUB SERPL-MCNC: 0.4 MG/DL (ref 0.1–1.5)
BUN SERPL-MCNC: 19 MG/DL (ref 8–22)
CALCIUM SERPL-MCNC: 8.2 MG/DL (ref 8.5–10.5)
CHLORIDE SERPL-SCNC: 100 MMOL/L (ref 96–112)
CO2 SERPL-SCNC: 30 MMOL/L (ref 20–33)
CREAT SERPL-MCNC: 0.71 MG/DL (ref 0.5–1.4)
EOSINOPHIL # BLD AUTO: 0.12 K/UL (ref 0–0.51)
EOSINOPHIL NFR BLD: 0.9 % (ref 0–6.9)
ERYTHROCYTE [DISTWIDTH] IN BLOOD BY AUTOMATED COUNT: 44.9 FL (ref 35.9–50)
GFR SERPLBLD CREATININE-BSD FMLA CKD-EPI: 108 ML/MIN/1.73 M 2
GLOBULIN SER CALC-MCNC: 4.1 G/DL (ref 1.9–3.5)
GLUCOSE SERPL-MCNC: 130 MG/DL (ref 65–99)
HCT VFR BLD AUTO: 43.4 % (ref 42–52)
HGB BLD-MCNC: 13.3 G/DL (ref 14–18)
LYMPHOCYTES # BLD AUTO: 5.21 K/UL (ref 1–4.8)
LYMPHOCYTES NFR BLD: 38.6 % (ref 22–41)
MAGNESIUM SERPL-MCNC: 2.4 MG/DL (ref 1.5–2.5)
MANUAL DIFF BLD: NORMAL
MCH RBC QN AUTO: 25.7 PG (ref 27–33)
MCHC RBC AUTO-ENTMCNC: 30.6 G/DL (ref 33.7–35.3)
MCV RBC AUTO: 83.8 FL (ref 81.4–97.8)
MONOCYTES # BLD AUTO: 1.07 K/UL (ref 0–0.85)
MONOCYTES NFR BLD AUTO: 7.9 % (ref 0–13.4)
MORPHOLOGY BLD-IMP: NORMAL
NEUTROPHILS # BLD AUTO: 7.1 K/UL (ref 1.82–7.42)
NEUTROPHILS NFR BLD: 52.6 % (ref 44–72)
NRBC # BLD AUTO: 0 K/UL
NRBC BLD-RTO: 0 /100 WBC
PHOSPHATE SERPL-MCNC: 2.6 MG/DL (ref 2.5–4.5)
PLATELET # BLD AUTO: 244 K/UL (ref 164–446)
PLATELET BLD QL SMEAR: NORMAL
PMV BLD AUTO: 9.4 FL (ref 9–12.9)
POTASSIUM SERPL-SCNC: 4.3 MMOL/L (ref 3.6–5.5)
PROT SERPL-MCNC: 6.9 G/DL (ref 6–8.2)
RBC # BLD AUTO: 5.18 M/UL (ref 4.7–6.1)
RBC BLD AUTO: NORMAL
SODIUM SERPL-SCNC: 138 MMOL/L (ref 135–145)
TEST NAME 95000: NORMAL
WBC # BLD AUTO: 13.5 K/UL (ref 4.8–10.8)

## 2022-10-10 PROCEDURE — 700102 HCHG RX REV CODE 250 W/ 637 OVERRIDE(OP): Performed by: INTERNAL MEDICINE

## 2022-10-10 PROCEDURE — 700111 HCHG RX REV CODE 636 W/ 250 OVERRIDE (IP): Performed by: STUDENT IN AN ORGANIZED HEALTH CARE EDUCATION/TRAINING PROGRAM

## 2022-10-10 PROCEDURE — 85007 BL SMEAR W/DIFF WBC COUNT: CPT

## 2022-10-10 PROCEDURE — 83735 ASSAY OF MAGNESIUM: CPT

## 2022-10-10 PROCEDURE — 84100 ASSAY OF PHOSPHORUS: CPT

## 2022-10-10 PROCEDURE — 770020 HCHG ROOM/CARE - TELE (206)

## 2022-10-10 PROCEDURE — A9270 NON-COVERED ITEM OR SERVICE: HCPCS | Performed by: INTERNAL MEDICINE

## 2022-10-10 PROCEDURE — 700111 HCHG RX REV CODE 636 W/ 250 OVERRIDE (IP): Performed by: HOSPITALIST

## 2022-10-10 PROCEDURE — 94660 CPAP INITIATION&MGMT: CPT

## 2022-10-10 PROCEDURE — 99233 SBSQ HOSP IP/OBS HIGH 50: CPT | Performed by: INTERNAL MEDICINE

## 2022-10-10 PROCEDURE — 80053 COMPREHEN METABOLIC PANEL: CPT

## 2022-10-10 PROCEDURE — A9270 NON-COVERED ITEM OR SERVICE: HCPCS | Performed by: STUDENT IN AN ORGANIZED HEALTH CARE EDUCATION/TRAINING PROGRAM

## 2022-10-10 PROCEDURE — 36415 COLL VENOUS BLD VENIPUNCTURE: CPT

## 2022-10-10 PROCEDURE — 700102 HCHG RX REV CODE 250 W/ 637 OVERRIDE(OP): Performed by: STUDENT IN AN ORGANIZED HEALTH CARE EDUCATION/TRAINING PROGRAM

## 2022-10-10 PROCEDURE — 85025 COMPLETE CBC W/AUTO DIFF WBC: CPT

## 2022-10-10 RX ADMIN — LINEZOLID 600 MG: 600 TABLET, FILM COATED ORAL at 05:23

## 2022-10-10 RX ADMIN — ENOXAPARIN SODIUM 40 MG: 40 INJECTION SUBCUTANEOUS at 16:56

## 2022-10-10 RX ADMIN — ASPIRIN 81 MG: 81 TABLET, COATED ORAL at 05:23

## 2022-10-10 RX ADMIN — LINEZOLID 600 MG: 600 TABLET, FILM COATED ORAL at 16:56

## 2022-10-10 RX ADMIN — SENNOSIDES AND DOCUSATE SODIUM 2 TABLET: 50; 8.6 TABLET ORAL at 05:23

## 2022-10-10 RX ADMIN — FUROSEMIDE 40 MG: 10 INJECTION, SOLUTION INTRAMUSCULAR; INTRAVENOUS at 05:23

## 2022-10-10 RX ADMIN — MAGNESIUM HYDROXIDE 30 ML: 400 SUSPENSION ORAL at 11:09

## 2022-10-10 RX ADMIN — DOXYCYCLINE 100 MG: 100 TABLET, FILM COATED ORAL at 16:56

## 2022-10-10 RX ADMIN — SENNOSIDES AND DOCUSATE SODIUM 2 TABLET: 50; 8.6 TABLET ORAL at 16:56

## 2022-10-10 RX ADMIN — DOXYCYCLINE 100 MG: 100 TABLET, FILM COATED ORAL at 05:23

## 2022-10-10 ASSESSMENT — PAIN DESCRIPTION - PAIN TYPE: TYPE: ACUTE PAIN

## 2022-10-10 NOTE — PROGRESS NOTES
Monitor summary: SR/ST , ID -0.18, QRS -0.07, QT -0.30, with rare PAC per strip from the monitor room.

## 2022-10-10 NOTE — PROGRESS NOTES
Monitor Summary: SR 89-98, MS -0.15, QRS -0.07, QT -0.31, with rare PVC per strip from the monitor room.

## 2022-10-10 NOTE — PROGRESS NOTES
"Hospital Medicine Daily Progress Note    Date of Service  10/10/2022    Chief Complaint  Josh Trivedi is a 57 y.o. male admitted 10/4/2022 with No chief complaint on file.      Hospital Course  No notes on file  Josh Trivedi is a 57 y.o. male with a history of obesity, sleep apnea, paroxysmal atrial fibrillation on Xarelto who presented 10/4/2022 with transfer from [facility] on 10/2 for worsening shortness of breath fevers and headache.  Upon admission he had been intubated and sedated due to witnessed seizure on 10/4 with altered mental status and hypoxia.     Patient is from [place] and approximately 2 weeks prior to admission had been bitten by a tick on the left lower extremity.  He had a rash of his bilateral legs associated with fever and headaches.  He went to an urgent care and was diagnosed with prostatitis and given antibiotics but he did not improve.  He went to the emergency room on 9/30 for fevers chills shortness of breath and headache.  Patient was discharged but presented again on 10/2 which led to current hospitalization.  At outside hospital he was worked up for potential tickborne illness including Lyme disease, [place] spotted fever, and meningitis.  Lumbar puncture obtained and CSF cultures, and protein (elevated 57), cell count and normal CSF glucose.  He has had elevated liver enzymes.  Patient was negative for COVID, HIV.  Infectious diseases been consulting and has concern of [place] spotted fever.  Serologies have been pending     10/8: Patient seen with his wife and mother at bedside.  Patient is awake but remains altered and somewhat confused.  Wife states this is quite off of his normal mentation.  Patient is eating his breakfast.  He was able to tell me his wife's name and mother's name.  Intermittently answers questions.  He was unable to tell me what city he was in or where he was at.  Updates given to patient's wife.\"    Dr. Lopez  Interval Problem " Update  10/9. On O2, No new issues or complaints. Had several LPs but per patient, no fuither need; awaiting final ID Abx plan.   10/10. Discussed with patient and wife. Patient has more lucidity per wife but still aphasic and weak. They believe this is infectious possibly [place] Spotted Fever. No strong evidence tests were negative.     I have discussed this patient's plan of care and discharge plan at IDT rounds today with Case Management, Nursing, Nursing leadership, and other members of the IDT team.    Consultants/Specialty  Intensivist admitted  Hospitalist  ID      Code Status  Full Code    Disposition  Patient is not medically cleared for discharge.   Anticipate discharge to  TBD .  I have placed the appropriate orders for post-discharge needs.    Review of Systems  Review of Systems   Unable to perform ROS: Mental acuity      Physical Exam  Temp:  [36 °C (96.8 °F)-36.9 °C (98.4 °F)] 36 °C (96.8 °F)  Pulse:  [] 91  Resp:  [18] 18  BP: (125-161)/(90-98) 125/90  SpO2:  [90 %-96 %] 95 %    Physical Exam  Vitals and nursing note reviewed.   Constitutional:       Appearance: He is obese.   HENT:      Head: Normocephalic and atraumatic.      Right Ear: External ear normal.      Left Ear: External ear normal.      Nose: Nose normal.      Mouth/Throat:      Mouth: Mucous membranes are moist.   Eyes:      General: No scleral icterus.     Conjunctiva/sclera: Conjunctivae normal.   Cardiovascular:      Rate and Rhythm: Normal rate and regular rhythm.      Heart sounds: No murmur heard.    No friction rub. No gallop.   Pulmonary:      Effort: Pulmonary effort is normal.      Breath sounds: Normal breath sounds.   Abdominal:      General: Abdomen is flat. Bowel sounds are normal. There is no distension.      Palpations: Abdomen is soft.      Tenderness: There is no abdominal tenderness. There is no guarding.   Musculoskeletal:         General: Normal range of motion.      Cervical back: Normal range of  motion and neck supple.   Skin:     General: Skin is warm.   Neurological:      Mental Status: He is alert and oriented to person, place, and time. Mental status is at baseline.      Motor: Weakness present.      Comments: SLowness of thought and speech  Aphasic or minimal speech   Psychiatric:         Mood and Affect: Mood normal.         Behavior: Behavior normal.         Thought Content: Thought content normal.         Judgment: Judgment normal.       Fluids    Intake/Output Summary (Last 24 hours) at 10/10/2022 1641  Last data filed at 10/9/2022 1844  Gross per 24 hour   Intake --   Output 500 ml   Net -500 ml         Laboratory  Recent Labs     10/08/22  0315 10/09/22  0432 10/10/22  0316   WBC 14.4* 12.7* 13.5*   RBC 4.73 4.61* 5.18   HEMOGLOBIN 12.5* 11.9* 13.3*   HEMATOCRIT 39.8* 39.0* 43.4   MCV 84.1 84.6 83.8   MCH 26.4* 25.8* 25.7*   MCHC 31.4* 30.5* 30.6*   RDW 46.0 45.3 44.9   PLATELETCT 227 209 244   MPV 10.1 9.2 9.4       Recent Labs     10/08/22  0315 10/09/22  0432 10/10/22  0316   SODIUM 140 137 138   POTASSIUM 4.0 4.3 4.3   CHLORIDE 103 101 100   CO2 30 28 30   GLUCOSE 109* 118* 130*   BUN 21 19 19   CREATININE 0.64 0.63 0.71   CALCIUM 7.8* 7.9* 8.2*                         Imaging  EC-ECHOCARDIOGRAM COMPLETE W/O CONT   Final Result      CT-CTA CHEST PULMONARY ARTERY W/ RECONS   Final Result         1.  No pulmonary embolus appreciated.   2.  Moderate bilateral pleural effusions. Associated linear consolidations in the lung bases suggests atelectasis, component of infiltrate not excluded.   3.  Low-density left hepatic lobe lesion, could represent small cyst or hemangioma, otherwise indeterminate. Could be followed up with three-phase CT liver for further characterization.      DX-ABDOMEN FOR TUBE PLACEMENT   Final Result      1.  Enteric tube overlies the proximal stomach.      CT-HEAD W/O   Final Result      Negative noncontrast CT scan of the head / brain.         DX-CHEST-PORTABLE (1 VIEW)  "  Final Result      1.  Satisfactory lines and tubes as detailed.   2.  Diffuse bilateral pulmonary opacities are nonspecific, may be related to extensive pulmonary edema or infection.   3.  There may be layering small pleural effusions. No pneumothorax.             Assessment/Plan  * Seizure, possible meningitis/h/o tick bite, hypoxia/par Afib/anticoagulation (held) ([facility])- (present on admission)  Assessment & Plan  Concern of potential CSF infection meningeal irritation  Infectious disease did consult  Continue antibiotics  Neurochecks\"    Awaiting final ID rx and antibiotic plan.  Discussed with ID physician who will see patient tomorrow to reevaluate  COntinue empiric treatment with linezolid and doxycycline  SNF/rehab planned    Elevated liver enzymes  Assessment & Plan  Monitor cmp  Of unclear etiology but suspect infectious    Petechial rash  Assessment & Plan  Resolving per wife.  Rash faint on lower legs.    Pleural effusion, bilateral  Assessment & Plan  Await echocardiogram  Effusions notable on CT chest  Lasix as tolerates  Supplemental oxygen.    Altered mental status  Assessment & Plan  Acute encephalopathy of unclear etiology  infectious etiology from tick bite versus autoimmune issue versus seizure?  Infectious disease working up infectious differential.  Part of the work-up is at outside hospital believe that his Fatemeh will need follow-up.  Patient has been on empiric antibiotics.  Patient has been having neurologic improvement although still altered per family.  CT head unremarkable.  Had LP at outside hospital.  ESR normal, CRP elevated  PT/OT    Tick bite of left lower leg  Assessment & Plan  Concern of possible [place] spotted fever  Await serologies and CSF studies    Paroxysmal A-fib ([facility])  Assessment & Plan  CHADSVasc score:1  Holding anticoagulation for now.  Okay for Lovenox for DVT prophylaxis.  10/7 Echo:  CONCLUSIONS  No prior study is available for comparison.   The left " "ventricular ejection fraction is visually estimated to be 55%.  Unable to estimate right ventricular systolic pressure due to an   inadequate tricuspid regurgitant jet.\"    Vitals:    10/09/22 1155   BP: (!) 146/99   Pulse: 86   Resp: 17   Temp: 36.4 °C (97.5 °F)   SpO2: 94%     HR stable  CHADSVasc 1 means apsirin alone is an option. Explained to family    Acute respiratory failure with hypoxia ([facility])  Assessment & Plan  Extubated 10/7  Supplemental oxygen keep SPO2 greater than 89%  Encourage deep inspiratory efforts  Mobilize as able  RT per protocol       VTE prophylaxis: enoxaparin ppx    I have performed a physical exam and reviewed and updated ROS and Plan today (10/10/2022). In review of yesterday's note (10/9/2022), there are no changes except as documented above.        "

## 2022-10-10 NOTE — DIETARY
Nutrition Services: Brief Update    Pt started on clear liquid diet 10/7 and cortrak was removed. Pt is now on Clear liquid day 3 and tolerating well per discussion with RN. Discussed with MD.    Recommendations/Plan:  Advance diet to regular per discussion with MD.  D/c TF orders.  Monitor PO intake.    RD following.

## 2022-10-11 PROBLEM — K21.9 GERD (GASTROESOPHAGEAL REFLUX DISEASE): Status: ACTIVE | Noted: 2022-10-11

## 2022-10-11 PROBLEM — R23.3 PETECHIAL RASH: Status: RESOLVED | Noted: 2022-10-08 | Resolved: 2022-10-11

## 2022-10-11 LAB
ALBUMIN SERPL BCP-MCNC: 2.9 G/DL (ref 3.2–4.9)
ALBUMIN/GLOB SERPL: 0.7 G/DL
ALP SERPL-CCNC: 69 U/L (ref 30–99)
ALT SERPL-CCNC: 83 U/L (ref 2–50)
ANION GAP SERPL CALC-SCNC: 9 MMOL/L (ref 7–16)
AST SERPL-CCNC: 46 U/L (ref 12–45)
B BURGDOR DNA SPEC QL NAA+PROBE: NOT DETECTED
BASOPHILS # BLD AUTO: 0 % (ref 0–1.8)
BASOPHILS # BLD: 0 K/UL (ref 0–0.12)
BILIRUB SERPL-MCNC: 0.5 MG/DL (ref 0.1–1.5)
BUN SERPL-MCNC: 24 MG/DL (ref 8–22)
CALCIUM SERPL-MCNC: 8.6 MG/DL (ref 8.5–10.5)
CHLORIDE SERPL-SCNC: 99 MMOL/L (ref 96–112)
CO2 SERPL-SCNC: 31 MMOL/L (ref 20–33)
CREAT SERPL-MCNC: 0.69 MG/DL (ref 0.5–1.4)
EKG IMPRESSION: NORMAL
EOSINOPHIL # BLD AUTO: 0 K/UL (ref 0–0.51)
EOSINOPHIL NFR BLD: 0 % (ref 0–6.9)
ERYTHROCYTE [DISTWIDTH] IN BLOOD BY AUTOMATED COUNT: 44.5 FL (ref 35.9–50)
GFR SERPLBLD CREATININE-BSD FMLA CKD-EPI: 109 ML/MIN/1.73 M 2
GLOBULIN SER CALC-MCNC: 3.9 G/DL (ref 1.9–3.5)
GLUCOSE SERPL-MCNC: 127 MG/DL (ref 65–99)
HCT VFR BLD AUTO: 44.1 % (ref 42–52)
HGB BLD-MCNC: 13.5 G/DL (ref 14–18)
LYME SOURCE Q4169: NORMAL
LYMPHOCYTES # BLD AUTO: 5.65 K/UL (ref 1–4.8)
LYMPHOCYTES NFR BLD: 49.1 % (ref 22–41)
MAGNESIUM SERPL-MCNC: 2.5 MG/DL (ref 1.5–2.5)
MANUAL DIFF BLD: NORMAL
MCH RBC QN AUTO: 25.7 PG (ref 27–33)
MCHC RBC AUTO-ENTMCNC: 30.6 G/DL (ref 33.7–35.3)
MCV RBC AUTO: 83.8 FL (ref 81.4–97.8)
MONOCYTES # BLD AUTO: 0.9 K/UL (ref 0–0.85)
MONOCYTES NFR BLD AUTO: 7.8 % (ref 0–13.4)
MORPHOLOGY BLD-IMP: NORMAL
NEUTROPHILS # BLD AUTO: 4.96 K/UL (ref 1.82–7.42)
NEUTROPHILS NFR BLD: 43.1 % (ref 44–72)
NRBC # BLD AUTO: 0 K/UL
NRBC BLD-RTO: 0 /100 WBC
PHOSPHATE SERPL-MCNC: 2.8 MG/DL (ref 2.5–4.5)
PLATELET # BLD AUTO: 278 K/UL (ref 164–446)
PLATELET BLD QL SMEAR: NORMAL
PMV BLD AUTO: 9.6 FL (ref 9–12.9)
POTASSIUM SERPL-SCNC: 3.9 MMOL/L (ref 3.6–5.5)
PROT SERPL-MCNC: 6.8 G/DL (ref 6–8.2)
RBC # BLD AUTO: 5.26 M/UL (ref 4.7–6.1)
RBC BLD AUTO: PRESENT
SMUDGE CELLS BLD QL SMEAR: NORMAL
SODIUM SERPL-SCNC: 139 MMOL/L (ref 135–145)
T PALLIDUM AB SER QL IA: NORMAL
WBC # BLD AUTO: 11.5 K/UL (ref 4.8–10.8)

## 2022-10-11 PROCEDURE — 84100 ASSAY OF PHOSPHORUS: CPT

## 2022-10-11 PROCEDURE — A9270 NON-COVERED ITEM OR SERVICE: HCPCS | Performed by: HOSPITALIST

## 2022-10-11 PROCEDURE — 770020 HCHG ROOM/CARE - TELE (206)

## 2022-10-11 PROCEDURE — 93010 ELECTROCARDIOGRAM REPORT: CPT | Performed by: INTERNAL MEDICINE

## 2022-10-11 PROCEDURE — 99232 SBSQ HOSP IP/OBS MODERATE 35: CPT | Performed by: HOSPITALIST

## 2022-10-11 PROCEDURE — 83735 ASSAY OF MAGNESIUM: CPT

## 2022-10-11 PROCEDURE — 86780 TREPONEMA PALLIDUM: CPT

## 2022-10-11 PROCEDURE — A9270 NON-COVERED ITEM OR SERVICE: HCPCS | Performed by: INTERNAL MEDICINE

## 2022-10-11 PROCEDURE — A9270 NON-COVERED ITEM OR SERVICE: HCPCS | Performed by: STUDENT IN AN ORGANIZED HEALTH CARE EDUCATION/TRAINING PROGRAM

## 2022-10-11 PROCEDURE — 85007 BL SMEAR W/DIFF WBC COUNT: CPT

## 2022-10-11 PROCEDURE — 94660 CPAP INITIATION&MGMT: CPT

## 2022-10-11 PROCEDURE — 36415 COLL VENOUS BLD VENIPUNCTURE: CPT

## 2022-10-11 PROCEDURE — 700111 HCHG RX REV CODE 636 W/ 250 OVERRIDE (IP): Performed by: STUDENT IN AN ORGANIZED HEALTH CARE EDUCATION/TRAINING PROGRAM

## 2022-10-11 PROCEDURE — 700102 HCHG RX REV CODE 250 W/ 637 OVERRIDE(OP): Performed by: INTERNAL MEDICINE

## 2022-10-11 PROCEDURE — 93005 ELECTROCARDIOGRAM TRACING: CPT | Performed by: HOSPITALIST

## 2022-10-11 PROCEDURE — 80053 COMPREHEN METABOLIC PANEL: CPT

## 2022-10-11 PROCEDURE — 92610 EVALUATE SWALLOWING FUNCTION: CPT

## 2022-10-11 PROCEDURE — 700102 HCHG RX REV CODE 250 W/ 637 OVERRIDE(OP): Performed by: STUDENT IN AN ORGANIZED HEALTH CARE EDUCATION/TRAINING PROGRAM

## 2022-10-11 PROCEDURE — 85025 COMPLETE CBC W/AUTO DIFF WBC: CPT

## 2022-10-11 PROCEDURE — 700102 HCHG RX REV CODE 250 W/ 637 OVERRIDE(OP): Performed by: HOSPITALIST

## 2022-10-11 PROCEDURE — 99233 SBSQ HOSP IP/OBS HIGH 50: CPT | Performed by: INTERNAL MEDICINE

## 2022-10-11 RX ORDER — METOPROLOL SUCCINATE 25 MG/1
25 TABLET, EXTENDED RELEASE ORAL
Status: DISCONTINUED | OUTPATIENT
Start: 2022-10-11 | End: 2022-10-19 | Stop reason: HOSPADM

## 2022-10-11 RX ADMIN — DOXYCYCLINE 100 MG: 100 TABLET, FILM COATED ORAL at 06:05

## 2022-10-11 RX ADMIN — ASPIRIN 81 MG: 81 TABLET, COATED ORAL at 06:05

## 2022-10-11 RX ADMIN — DOXYCYCLINE 100 MG: 100 TABLET, FILM COATED ORAL at 17:49

## 2022-10-11 RX ADMIN — RIVAROXABAN 10 MG: 10 TABLET, FILM COATED ORAL at 17:49

## 2022-10-11 RX ADMIN — LIDOCAINE HYDROCHLORIDE 30 ML: 20 SOLUTION OROPHARYNGEAL at 10:32

## 2022-10-11 RX ADMIN — METOPROLOL SUCCINATE 25 MG: 25 TABLET, FILM COATED, EXTENDED RELEASE ORAL at 10:32

## 2022-10-11 RX ADMIN — FUROSEMIDE 40 MG: 10 INJECTION, SOLUTION INTRAMUSCULAR; INTRAVENOUS at 06:05

## 2022-10-11 RX ADMIN — LINEZOLID 600 MG: 600 TABLET, FILM COATED ORAL at 06:05

## 2022-10-11 ASSESSMENT — COGNITIVE AND FUNCTIONAL STATUS - GENERAL
DRESSING REGULAR LOWER BODY CLOTHING: A LOT
SUGGESTED CMS G CODE MODIFIER DAILY ACTIVITY: CL
PERSONAL GROOMING: A LOT
MOVING FROM LYING ON BACK TO SITTING ON SIDE OF FLAT BED: A LOT
WALKING IN HOSPITAL ROOM: A LOT
TURNING FROM BACK TO SIDE WHILE IN FLAT BAD: A LITTLE
STANDING UP FROM CHAIR USING ARMS: A LOT
TOILETING: A LOT
HELP NEEDED FOR BATHING: A LOT
MOVING TO AND FROM BED TO CHAIR: A LITTLE
CLIMB 3 TO 5 STEPS WITH RAILING: A LOT
DRESSING REGULAR UPPER BODY CLOTHING: A LOT
DAILY ACTIVITIY SCORE: 12
EATING MEALS: A LOT
SUGGESTED CMS G CODE MODIFIER MOBILITY: CL
MOBILITY SCORE: 14

## 2022-10-11 ASSESSMENT — PAIN DESCRIPTION - PAIN TYPE
TYPE: ACUTE PAIN
TYPE: ACUTE PAIN

## 2022-10-11 NOTE — PROGRESS NOTES
Monitor summary: SR 84-92, FL -0.15, QRS -0.08, QT -0.34, with rare PAC and PVCs per strip from the monitor room.

## 2022-10-11 NOTE — CARE PLAN
The patient is Stable - Low risk of patient condition declining or worsening    Shift Goals  Clinical Goals: Safety/mobility  Patient Goals: Rest  Family Goals: Comfort    Progress made toward(s) clinical / shift goals:        Problem: Knowledge Deficit - Standard  Goal: Patient and family/care givers will demonstrate understanding of plan of care, disease process/condition, diagnostic tests and medications  Outcome: Progressing     Problem: Pain - Standard  Goal: Alleviation of pain or a reduction in pain to the patient’s comfort goal  Outcome: Progressing     Problem: Skin Integrity  Goal: Skin integrity is maintained or improved  Outcome: Progressing     Problem: Fall Risk  Goal: Patient will remain free from falls  Outcome: Progressing     Problem: Safety - Medical Restraint  Goal: Remains free of injury from restraints (Restraint for Interference with Medical Device)  Outcome: Progressing  Goal: Free from restraint(s) (Restraint for Interference with Medical Device)  Outcome: Progressing       Patient is not progressing towards the following goals:

## 2022-10-11 NOTE — PROGRESS NOTES
Hospital Medicine Daily Progress Note    Date of Service  10/11/2022    Chief Complaint  Josh Trivedi is a 57 y.o. male admitted 10/4/2022 with No chief complaint on file.      Hospital Course  No notes on file  Josh Trivedi is a 57 y.o. male with a history of obesity, sleep apnea, paroxysmal atrial fibrillation on Xarelto who presented 10/4/2022 with transfer from [facility] on 10/2 for worsening shortness of breath fevers and headache.  Upon admission he had been intubated and sedated due to witnessed seizure on 10/4 with altered mental status and hypoxia.     Patient is from [place] and approximately 2 weeks prior to admission had been bitten by a tick on the left lower extremity.  He had a rash of his bilateral legs associated with fever and headaches.  He went to an urgent care and was diagnosed with prostatitis and given antibiotics but he did not improve.  He went to the emergency room on 9/30 for fevers chills shortness of breath and headache.  Patient was discharged but presented again on 10/2 which led to current hospitalization.  At outside hospital he was worked up for potential tickborne illness including Lyme disease, [place] spotted fever, and meningitis.  Lumbar puncture obtained and CSF cultures, and protein (elevated 57), cell count and normal CSF glucose.  He has had elevated liver enzymes.  Patient was negative for COVID, HIV.  Infectious diseases been consulting and has concern of [place] spotted fever.      Interval Problem Update      Patient is alert he is slow to respond Per wife at bedside he is clinically improved and was able to enter passcode for his cell phone  Per nursing staff complaining of heartburn this morning EKG done and reviewed with no acute ischemic changes  Reviewed with wife Patient with history of A. fib on Xarelto metoprolol at home      I have discussed this patient's plan of care and discharge plan at IDT rounds today with Case Management,  Nursing, Nursing leadership, and other members of the IDT team.    Consultants/Specialty  Intensivist admitted  Hospitalist  ID      Code Status  Full Code    Disposition  Patient is not medically cleared for discharge.   Anticipate discharge to  D .  I have placed the appropriate orders for post-discharge needs.    Review of Systems  Review of Systems   Unable to perform ROS: Medical condition      Physical Exam  Temp:  [36.6 °C (97.9 °F)-36.8 °C (98.2 °F)] 36.6 °C (97.9 °F)  Pulse:  [] 70  Resp:  [17-20] 17  BP: (125-146)/(75-90) 125/85  SpO2:  [88 %-97 %] 96 %    Physical Exam  Vitals and nursing note reviewed.   Constitutional:       Appearance: He is well-developed. He is not diaphoretic.   HENT:      Head: Normocephalic and atraumatic.      Mouth/Throat:      Pharynx: No oropharyngeal exudate.   Eyes:      General: No scleral icterus.        Right eye: No discharge.         Left eye: No discharge.      Conjunctiva/sclera: Conjunctivae normal.      Pupils: Pupils are equal, round, and reactive to light.   Neck:      Vascular: No JVD.      Trachea: No tracheal deviation.   Cardiovascular:      Rate and Rhythm: Regular rhythm. Tachycardia present.      Heart sounds: No murmur heard.    No friction rub. No gallop.   Pulmonary:      Effort: Pulmonary effort is normal. No respiratory distress.      Breath sounds: Normal breath sounds. No stridor. No wheezing.   Chest:      Chest wall: No tenderness.   Abdominal:      General: Bowel sounds are normal. There is no distension.      Palpations: Abdomen is soft.      Tenderness: There is no abdominal tenderness. There is no rebound.   Musculoskeletal:         General: No tenderness.      Cervical back: Neck supple.   Skin:     General: Skin is warm and dry.      Nails: There is no clubbing.   Neurological:      Mental Status: He is alert.      Motor: Weakness present. No abnormal muscle tone.      Comments: Oriented x2  Slow to respond with difficulty finding  words  Generalized weakness   Psychiatric:         Speech: Speech is delayed.         Behavior: Behavior is slowed.       Fluids    Intake/Output Summary (Last 24 hours) at 10/11/2022 1609  Last data filed at 10/11/2022 1000  Gross per 24 hour   Intake --   Output 1750 ml   Net -1750 ml         Laboratory  Recent Labs     10/09/22  0432 10/10/22  0316 10/11/22  0442   WBC 12.7* 13.5* 11.5*   RBC 4.61* 5.18 5.26   HEMOGLOBIN 11.9* 13.3* 13.5*   HEMATOCRIT 39.0* 43.4 44.1   MCV 84.6 83.8 83.8   MCH 25.8* 25.7* 25.7*   MCHC 30.5* 30.6* 30.6*   RDW 45.3 44.9 44.5   PLATELETCT 209 244 278   MPV 9.2 9.4 9.6       Recent Labs     10/09/22  0432 10/10/22  0316 10/11/22  0442   SODIUM 137 138 139   POTASSIUM 4.3 4.3 3.9   CHLORIDE 101 100 99   CO2 28 30 31   GLUCOSE 118* 130* 127*   BUN 19 19 24*   CREATININE 0.63 0.71 0.69   CALCIUM 7.9* 8.2* 8.6                         Imaging  EC-ECHOCARDIOGRAM COMPLETE W/O CONT   Final Result      CT-CTA CHEST PULMONARY ARTERY W/ RECONS   Final Result         1.  No pulmonary embolus appreciated.   2.  Moderate bilateral pleural effusions. Associated linear consolidations in the lung bases suggests atelectasis, component of infiltrate not excluded.   3.  Low-density left hepatic lobe lesion, could represent small cyst or hemangioma, otherwise indeterminate. Could be followed up with three-phase CT liver for further characterization.      DX-ABDOMEN FOR TUBE PLACEMENT   Final Result      1.  Enteric tube overlies the proximal stomach.      CT-HEAD W/O   Final Result      Negative noncontrast CT scan of the head / brain.         DX-CHEST-PORTABLE (1 VIEW)   Final Result      1.  Satisfactory lines and tubes as detailed.   2.  Diffuse bilateral pulmonary opacities are nonspecific, may be related to extensive pulmonary edema or infection.   3.  There may be layering small pleural effusions. No pneumothorax.      MR-BRAIN-WITH & W/O    (Results Pending)          Assessment/Plan  * Seizure,  "possible meningitis/h/o tick bite, hypoxia/par Afib/anticoagulation (held) ([facility])- (present on admission)  Assessment & Plan  Concern of potential CSF infection     Discussed with Dr. Mccabe from ID she recommends obtaining MRI of the brain given persistent encephalopathy  Continue doxycycline and DC Zyvox per ID recommendations        GERD (gastroesophageal reflux disease)  Assessment & Plan  pepcid    Elevated liver enzymes  Assessment & Plan  Abdominal exam is benign  LFTs trended down    Altered mental status  Assessment & Plan   acute encephalopathy    Empirically treated with antibiotics given recent tick infection  Discussed with ID continue doxycycline and repeat MRI of brain given persistent symptoms    Tick bite of left lower leg  Assessment & Plan      Serologies were negative  Lyme PCR negative      Paroxysmal A-fib ([facility])  Assessment & Plan  CHADSVasc score:1  Holding anticoagulation for now.  On low-dose aspirin  10/7 Echo:  CONCLUSIONS  No prior study is available for comparison.   The left ventricular ejection fraction is visually estimated to be 55%.  Unable to estimate right ventricular systolic pressure due to an   inadequate tricuspid regurgitant jet.\"      HR stable  CHADSVasc 1 means apsirin alone is an option. Explained to family    Acute respiratory failure with hypoxia ([facility])  Assessment & Plan  Extubated 10/7  Wean off oxygen as tolerated  Aspiration precautions and RT protocol       VTE prophylaxis: Xarelto 10 mg daily as prophylaxis    I have performed a physical exam and reviewed and updated ROS and Plan today (10/11/2022). In review of yesterday's note (10/10/2022), there are no changes except as documented above.        "

## 2022-10-11 NOTE — PROGRESS NOTES
Infectious Disease Progress Note    Author: Genoveva Mccabe M.D. Date & Time of service: 10/11/2022  9:59 AM    Chief Complaint:  Encephalopathy, concern for encephalitis associated with potential tickborne illness    Interval History:      Review of Systems:  Review of Systems   Unable to perform ROS: Mental status change     Hemodynamics:  Temp (24hrs), Av.4 °C (97.6 °F), Min:36 °C (96.8 °F), Max:36.8 °C (98.2 °F)  Temperature: 36.6 °C (97.9 °F)  Pulse  Av.3  Min: 56  Max: 145   Blood Pressure: (!) 146/86 (Rn notified )       Physical Exam:  Physical Exam  Cardiovascular:      Rate and Rhythm: Normal rate and regular rhythm.      Heart sounds: Normal heart sounds.   Pulmonary:      Effort: Pulmonary effort is normal.      Breath sounds: Normal breath sounds.   Abdominal:      General: Abdomen is flat. Bowel sounds are normal.      Palpations: Abdomen is soft.   Musculoskeletal:         General: Normal range of motion.   Skin:     General: Skin is warm and dry.   Neurological:      Comments: Extremely somnolent, I was able to get him to open eyes but did not respond to me or follow any commands       Meds:    Current Facility-Administered Medications:     hyoscyamine-lidocaine-Maalox (GI Cocktail)    metoprolol SR    aspirin EC    enoxaparin (LOVENOX) injection    furosemide    doxycycline monohydrate    linezolid    senna-docusate **AND** polyethylene glycol/lytes **AND** magnesium hydroxide **AND** bisacodyl    acetaminophen    ondansetron    Labs:  Recent Labs     10/09/22  0432 10/10/22  0316 10/11/22  0442   WBC 12.7* 13.5* 11.5*   RBC 4.61* 5.18 5.26   HEMOGLOBIN 11.9* 13.3* 13.5*   HEMATOCRIT 39.0* 43.4 44.1   MCV 84.6 83.8 83.8   MCH 25.8* 25.7* 25.7*   RDW 45.3 44.9 44.5   PLATELETCT 209 244 278   MPV 9.2 9.4 9.6   NEUTSPOLYS 45.30 52.60 43.10*   LYMPHOCYTES 40.20 38.60 49.10*   MONOCYTES 11.10 7.90 7.80   EOSINOPHILS 2.60 0.90 0.00   BASOPHILS 0.00 0.00 0.00   RBCMORPHOLO Present Normal  Present     Recent Labs     10/09/22  0432 10/10/22  0316 10/11/22  0442   SODIUM 137 138 139   POTASSIUM 4.3 4.3 3.9   CHLORIDE 101 100 99   CO2 28 30 31   GLUCOSE 118* 130* 127*   BUN 19 19 24*     Recent Labs     10/09/22  0432 10/10/22  0316 10/11/22  0442   ALBUMIN 2.3* 2.8* 2.9*   TBILIRUBIN 0.4 0.4 0.5   ALKPHOSPHAT 65 74 69   TOTPROTEIN 5.9* 6.9 6.8   ALTSGPT 101* 90* 83*   ASTSGOT 50* 41 46*   CREATININE 0.63 0.71 0.69       Imaging:  CT-HEAD W/O    Result Date: 10/4/2022  10/4/2022 5:23 PM HISTORY/REASON FOR EXAM:  Seizure, new-onset, no history of trauma. TECHNIQUE/EXAM DESCRIPTION AND NUMBER OF VIEWS: CT of the head without contrast. The study was performed on a helical multidetector CT scanner. Contiguous 5 mm axial sections were obtained from the skull base through the vertex. Up to date radiation dose reduction adjustments have been utilized to meet ALARA standards for radiation dose reduction. COMPARISON:  None FINDINGS: CALVARIA:  The calvaria are normal in appearance. BRAIN:  The brain parenchyma is normal in appearance. VENTRICULAR SYSTEM: Ventricular system is normal in size and position. There is no hemorrhage or evidence for acute infarct. There are no extra-axial fluid collection. Sinuses:  The paranasal sinuses are clear. The mastoid air cells and middle ear are clear. The orbital contents are normal in appearance.     Negative noncontrast CT scan of the head / brain.     DX-CHEST-PORTABLE (1 VIEW)    Result Date: 10/4/2022  10/4/2022 2:02 PM HISTORY/REASON FOR EXAM:  Shortness of Breath. TECHNIQUE/EXAM DESCRIPTION AND NUMBER OF VIEWS: Single portable view of the chest. COMPARISON: None FINDINGS: Endotracheal tube terminates 2.1 cm of the matty. Right IJ catheter tip projects in the SVC. Cardiomediastinal silhouette is normal. Diffuse airspace and probable interstitial opacities bilaterally. Layering small pleural effusions are probably present. No pneumothorax. No acute osseous abnormality.      1.  Satisfactory lines and tubes as detailed. 2.  Diffuse bilateral pulmonary opacities are nonspecific, may be related to extensive pulmonary edema or infection. 3.  There may be layering small pleural effusions. No pneumothorax.    CT-CTA CHEST PULMONARY ARTERY W/ RECONS    Result Date: 10/5/2022  10/5/2022 10:05 PM HISTORY/REASON FOR EXAM:  Fever, dyspnea, tachycardia. Symptoms worsening TECHNIQUE/EXAM DESCRIPTION:  CT angiogram of the chest with contrast. Transaxial MDCT scan of chest post bolus 45 cc Omnipaque 350 IV administration. MIP reconstructed images were created and reviewed. Low dose optimization technique was utilized for this CT exam including automated exposure control and adjustment of the mA and/or kV according to patient size. COMPARISON: None FINDINGS: The visualized portion of the thyroid appear within normal limits.  The trachea and main stem airways are normal in caliber. There are no pathologically enlarged mediastinal lymph nodes. The heart and pericardium appear within normal limits.   The aorta and its main branch vessels are normal in caliber and configuration.  The pulmonary arteries are well opacified without visualized filling defect. The pulmonary parenchyma demonstrates linear consolidations in the bilateral lung bases. There is moderate bilateral pleural effusion seen. Limited views of the abdomen demonstrate no acute abnormality. 11.6 mm low-density left hepatic lobe lesion is seen. The bony structures are age appropriate.     1.  No pulmonary embolus appreciated. 2.  Moderate bilateral pleural effusions. Associated linear consolidations in the lung bases suggests atelectasis, component of infiltrate not excluded. 3.  Low-density left hepatic lobe lesion, could represent small cyst or hemangioma, otherwise indeterminate. Could be followed up with three-phase CT liver for further characterization.    EC-ECHOCARDIOGRAM COMPLETE W/O CONT    Result Date: 10/8/2022  Transthoracic  Echo Report Echocardiography Laboratory CONCLUSIONS No prior study is available for comparison. The left ventricular ejection fraction is visually estimated to be 55%. Unable to estimate right ventricular systolic pressure due to an inadequate tricuspid regurgitant jet. LEANDRA JONES Exam Date:         10/07/2022                    14:26 Exam Location:     Inpatient Priority:          Routine Ordering Physician:        KHRIS MADERA Referring Physician:       [id number] Niño Sonographer:               Jake ACEVEDO Age:    57     Gender:    M MRN:    [id number] :    1965 BSA:    2.64   Ht (in):    75     Wt (lb):    309 Exam Type:     Complete Indications:     Congestive Heart Failure ICD Codes:       428 CPT Codes:       07616 BP:   86     /   54     HR: Technical Quality:       Fair MEASUREMENTS  (Male / Female) Normal Values 2D ECHO LV Diastolic Diameter PLAX        5.2 cm                4.2 - 5.9 / 3.9 - 5.3 cm LV Systolic Diameter PLAX         3.8 cm                2.1 - 4.0 cm IVS Diastolic Thickness           1.4 cm                LVPW Diastolic Thickness          1.2 cm                LVOT Diameter                     2 cm                  Estimated LV Ejection Fraction    55 %                  LV Ejection Fraction MOD BP       51.4 %                >= 55  % LV Ejection Fraction MOD 4C       56.2 %                LV Ejection Fraction MOD 2C       44.3 %                DOPPLER AV Peak Velocity                  1.5 m/s               AV Peak Gradient                  9.3 mmHg              AV Mean Gradient                  4.8 mmHg              LVOT Peak Velocity                0.81 m/s              AV Area Cont Eq vti               2.1 cm2               MV Velocity Time Integral         21.7 cm               Mitral E Point Velocity           0.93 m/s              Mitral E to A Ratio               0.98                  Mitral A Duration                 142 ms                 MV Pressure Half Time             81.8 ms               MV Area PHT                       2.7 cm2               MV Deceleration Time              242 ms                PV Peak Velocity                  1.1 m/s               PV Peak Gradient                  5.3 mmHg              RVOT Peak Velocity                0.96 m/s              * Indicates values subject to auto-interpretation LV EF:  55    % FINDINGS Left Ventricle Normal left ventricular chamber size. Mild concentric left ventricular hypertrophy. Normal left ventricular systolic function. The left ventricular ejection fraction is visually estimated to be 55%. Normal regional wall motion. Indeterminate diastolic function. Right Ventricle The right ventricle is normal in size and systolic function. Right Atrium The right atrium is normal in size. Normal inferior vena cava size and inspiratory collapse. Left Atrium The left atrium is normal in size. Left atrial volume index is 17.2 mL/sq m. Mitral Valve Structurally normal mitral valve without significant stenosis or regurgitation. Aortic Valve Structurally normal aortic valve without significant stenosis or regurgitation. Tricuspid Valve Structurally normal tricuspid valve without significant stenosis. Trace tricuspid regurgitation. Unable to estimate right ventricular systolic pressure due to an inadequate tricuspid regurgitant jet. Pulmonic Valve Structurally normal pulmonic valve without significant stenosis or regurgitation. Pericardium Normal pericardium without effusion. Aorta Normal aortic root for body surface area. The ascending aorta diameter is 3.2 cm. Cam Horvath MD (Electronically Signed) Final Date:     08 October 2022                 06:44    DX-ABDOMEN FOR TUBE PLACEMENT    Result Date: 10/5/2022  10/5/2022 4:54 PM HISTORY/REASON FOR EXAM:  Line evaluation. TECHNIQUE/EXAM DESCRIPTION AND NUMBER OF VIEWS:  1 view(s) of the abdomen. COMPARISON:  None. FINDINGS: Enteric tube  "has been placed. The tip projects over the left upper abdomen. The bowel gas pattern is within normal limits. Limited imaging of the chest demonstrates satisfactory appearance of an ET tube and right vascular catheter. There are diffuse bilateral parenchymal hazy opacities.     1.  Enteric tube overlies the proximal stomach.      Micro:  Results       Procedure Component Value Units Date/Time    BLOOD CULTURE [[id number]] Collected: 10/04/22 1422    Order Status: Completed Specimen: Blood from Peripheral Updated: 10/09/22 1700     Significant Indicator NEG     Source BLD     Site PERIPHERAL     Culture Result No growth after 5 days of incubation.    Narrative:      Per Hospital Policy: Only change Specimen Src: to \"Line\" if  specified by physician order.  No site indicated    BLOOD CULTURE [[id number]] Collected: 10/04/22 1420    Order Status: Completed Specimen: Blood from Peripheral Updated: 10/09/22 1700     Significant Indicator NEG     Source BLD     Site PERIPHERAL     Culture Result No growth after 5 days of incubation.    Narrative:      Per Hospital Policy: Only change Specimen Src: to \"Line\" if  specified by physician order.  Left Wrist    CSF Culture [[id number]]     Order Status: No result Specimen: CSF from Tap     CULTURE RESPIRATORY W/ GRM STN [[id number]] Collected: 10/04/22 1414    Order Status: Completed Specimen: Respirate from Sputum Updated: 10/06/22 1119     Significant Indicator NEG     Source RESP     Site Tracheal Aspirate     Culture Result No growth at 48 hours.     Gram Stain Result Few WBCs.  No organisms seen.      Narrative:      Collected By: [id number] YANCI MAY  Collected By: [id number] STEFFANIE FRANCO  Collected By: [id number] YANCI MAY    URINE CULTURE(NEW) [[id number]] Collected: 10/04/22 1415    Order Status: Completed Specimen: Urine, Martin Cath Updated: 10/06/22 0721     Significant Indicator NEG     Source UR     Site URINE, MARTIN CATH     Culture Result No growth at 48 " "hours.    Narrative:      Collected By: [id number] YANCI MAY  Indication for culture:->Patient WITHOUT an indwelling Irene  catheter in place with new onset of Dysuria, Frequency,  Urgency, and/or Suprapubic pain  Collected By: [id number] YANCI MAY  UTILIZATION ALERT: Has Flu/RSV/COVID PCR testing been  performed, resulted reviewed, and consulted with Infectious  Diseases or PICU Provider Teams?->Yes  Have you been in close contact with a person who is suspected  or known to be positive for COVID-19 within the last 30 days  (e.g. last seen that person < 30 days ago)->Yes  Collected By: [id number] YANCI MAY    Cryptococcal Antigen, Serum [[id number]] Collected: 10/04/22 1600    Order Status: Completed Updated: 10/04/22 1924     Cryptococcal Antigen, Serum Negative    GRAM STAIN [[id number]] Collected: 10/04/22 1414    Order Status: Completed Specimen: Respirate Updated: 10/04/22 1836     Significant Indicator .     Source RESP     Site Tracheal Aspirate     Gram Stain Result Few WBCs.  No organisms seen.      Narrative:      Collected By: [id number] YANCI MAY  Collected By: [id number] STEFFANIE FRANCO  Collected By: [id number] YANCI MAY    MRSA By PCR (Amp) [[id number]] Collected: 10/04/22 1605    Order Status: Completed Specimen: Respirate from Nares Updated: 10/04/22 1833     MRSA by PCR Negative    Narrative:      Collected By: [id number] YANCI MAY    Respiratory Panel By PCR [[id number]] Collected: 10/04/22 1432    Order Status: Completed Specimen: Respirate from Nasopharyngeal Updated: 10/04/22 1702     Adenovirus, PCR Not Detected     SARS-CoV-2 (COVID-19) RNA by ALLY NotDetected     Comment: PATIENTS: Important information regarding your results and instructions can  be found at [email or web address]   \"After your  Covid-19 Test\"    [facility] providers: PLEASE REFER TO DE-ESCALATION AND RETESTING PROTOCOL  on [email or web address]    **The [employer] Respiratory Panel 2.1 " "(RP2.1) RT-PCR test is FDA authorized.          Coronavirus 229E, PCR Not Detected     Coronavirus HKU1, PCR Not Detected     Coronavirus NL63, PCR Not Detected     Coronavirus OC43, PCR Not Detected     Human Metapneumovirus, PCR Not Detected     Rhino/Enterovirus, PCR Not Detected     Influenza A, PCR Not Detected     Influenza B, PCR Not Detected     Parainfluenza 1, PCR Not Detected     Parainfluenza 2, PCR Not Detected     Parainfluenza 3, PCR Not Detected     Parainfluenza 4, PCR Not Detected     RSV (Respiratory Syncytial Virus), PCR Not Detected     Bordetella parapertussis (CA8216), PCR Not Detected     Bordetella pertussis (ptxP), PCR Not Detected     Mycoplasma pneumoniae, PCR Not Detected     Chlamydia pneumoniae, PCR Not Detected    Narrative:      Collected By: [id number] YANCI MAY  Indication for culture:->Patient WITHOUT an indwelling Irene  catheter in place with new onset of Dysuria, Frequency,  Urgency, and/or Suprapubic pain  Collected By: [id number] YANCI MAY  UTILIZATION ALERT: Has Flu/RSV/COVID PCR testing been  performed, resulted reviewed, and consulted with Infectious  Diseases or PICU Provider Teams?->Yes  Have you been in close contact with a person who is suspected  or known to be positive for COVID-19 within the last 30 days  (e.g. last seen that person < 30 days ago)->Yes    COV-2, FLU A/B, AND RSV BY PCR (2-4 HOURS CEPHEID): Collect NP swab in VTM [[id number]] Collected: 10/04/22 1432    Order Status: Completed Specimen: Respirate from Nasopharyngeal Updated: 10/04/22 1609     Influenza virus A RNA Negative     Influenza virus B, PCR Negative     RSV, PCR Negative     SARS-CoV-2 by PCR NotDetected     Comment: PATIENTS: Important information regarding your results and instructions can  be found at [email or web address]   \"After your  Covid-19 Test\"    [facility] providers: PLEASE REFER TO DE-ESCALATION AND RETESTING PROTOCOL  on " [email or web address]    **The [employer] GeneXpert Xpress SARS-CoV-2 RT-PCR Test has been made  available for use under the Emergency Use Authorization (EUA) only.          SARS-CoV-2 Source NP Swab    Narrative:      Collected By: [id number] YANCI MAY    URINALYSIS [[id number]]  (Abnormal) Collected: 10/04/22 1415    Order Status: Completed Specimen: Urine, Cath Updated: 10/04/22 1525     Color Yellow     Character Turbid     Specific Gravity 1.025     Ph 6.0     Glucose Negative mg/dL      Ketones Negative mg/dL      Protein Negative mg/dL      Bilirubin Negative     Urobilinogen, Urine 0.2     Nitrite Negative     Leukocyte Esterase Small     Occult Blood Moderate     Micro Urine Req Microscopic    Narrative:      Collected By: [id number] YANCI MAY  Collected By: [id number] STEFFANIE FRANCO    Cryptococcal Antigen, Serum [[id number]]     Order Status: No result     FUNGAL BLOOD CULTURE [[id number]] Collected: 10/04/22 1420    Order Status: Sent Specimen: Blood     S. Aureus By PCR, Nasal Complete [[id number]]     Order Status: Canceled Specimen: Respirate             Assessment:  Active Hospital Problems    Diagnosis     *Seizure, possible meningitis/h/o tick bite, hypoxia/par Afib/anticoagulation (held) (HCC) [R56.9]     Altered mental status [R41.82]     Pleural effusion, bilateral [J90]     Petechial rash [R23.3]     Elevated liver enzymes [R74.8]     Acute respiratory failure with hypoxia (HCC) [J96.01]     Paroxysmal A-fib (HCC) [I48.0]     Tick bite of left lower leg [S80.862A, W57.XXXA]      Interval 24 hours:      AF, O2 5 L NC   Labs reviewed  Imaging personally reviewed both images and report.   Micro reviewed    Patient quite somnolent during my exam and did not respond.  Continued on doxycycline and will stop linezolid as unclear need.    Assessment:  Josh Trivedi is a 57 y.o. male with past medical history significant for paroxysmal A. fib on Xarelto, obesity, ALEXANDER and no history of epilepsy admitted  10/4/2022 from [facility] ER for dyspnea, rash, and history of tick bite.  He is initially quite encephalopathic and required intubation.  Per notes he had been in [place] approximately 2 weeks prior to abdomen and noticed a tick on his ankle which was immediately removed.  He reports a petechial rash as well as headaches and neck pain.  Rash had improved.  He presented to [facility] and was admitted.  During his care he had a witnessed tonic-clonic seizure and was intubated.  Per report LP was done at [facility] with mildly elevated protein at 57, glucose at 70, no report of cell count.  normal WBC normal glucose but elevated protein.  He has had persistent elevated liver enzymes.  He had increased on ceftriaxone so the concern was that this potentially was contributing.  Ceftriaxone was stopped and they have improved but are still elevated.      Problems:    Leukocytosis, ongoing  Encephalopathy, ongoing possible encephalitis although WBC was normal and repeat LP here with negative meningeal encephalitis panel.  Unfortunately no cell count protein or glucose was sent from our CSF fluid.  Per notes his mentation had improved, today he is extremely somnolent to my exam and I am unable to keep him awake.  -His mentation had improved so no LP was done at [facility]   Tick bite  Suspected RMSF, due to tick bite and rash, however antibodies are negative  -CSF cultures drawn at [facility] are no growth  -Serum crag-negative  -Lyme serology-pending  -Rickettsia Rickettsia high serology-neg   -Cocci serology-pending  -Aspergillus galactomannan-negative   -Fungitell-intermediate  -Urine Legionella and Streptococcus antigen-negative  -HIV negative  -CSF Borrelia burgdorferi reflex panel PCR - negative   --Respiratory viral panel negative  Transaminitis, ongoing  Reported seizure  Paroxysmal A. Fib     Plan:    --- Continue doxycycline 100 mg BID for a 14 day course  (need to extend) stopped linezolid as unclear need  --- Follow-up any outstanding testing but so far there is no clear infectious explanation for his symptoms  --- This is my first day with him and he was quite somnolent.  If this is worsening from his prior mentation I would recommend MRI head with contrast.   --- If MRI is unrevealing and mentation is not improved/declining will recommend repeating LP -no cell count, no meningoencephalitis panel was done, no West Nile testing, no noninfectious CSF work-up. Prior planned LP was deferred as he was reported to have had much improved mentation.  --- We are attempting to get cell count from the fist LP at OSH but it may not have ever been performed  --- Low threshold to check for seizure given history   --- Recommend neurology reevaluation if worsening mentation as I have no clear infectious source for his presentation.  If this was due to a tickborne disease it should have improved with doxycycline.Case reports of encephalitis with B. Miyamotoi but also should respond to doxy.     Discussed with internal medicine, Dr. Fide Hickman. ID will follow.

## 2022-10-11 NOTE — THERAPY
Speech Language Pathology   Clinical Swallow Evaluation     Patient Name: Josh Trivedi  AGE:  57 y.o., SEX:  male  Medical Record #: [id number]  Today's Date: 10/11/2022     Precautions  Precautions: Fall Risk, Swallow Precautions ( See Comments)    Assessment    HPI: 57 y.o. man admitted on 10/4/22 as a transfer from [facility] for worsening SOB, fevers, and headache; hypoxic, witnessed seizure, intubated 10/4-10/7 (76 hours). Suspected to have [place] Spotted Fever (tick bite).     CMH: seizure, possible meningitis, hypoxia, afib, elevated liver enzymres, petechial rash, pleural effusion bilateral, AMS (acute encephalopathy), tick bite L lower leg, acute respiratory failure.     PMH: obesity, sleep apnea. Not previously seen by service per EMR.     Imaging:  10/4/22 CT Head: Negative noncontrast CT scan of the head / brain.    10/4/22 CXR: Satisfactory lines and tubes as detailed. Diffuse bilateral pulmonary opacities are nonspecific, may be related to extensive pulmonary edema or infection. There may be layering small pleural effusions. No pneumothorax.    10/5/22 CTA Chest:   1.  No pulmonary embolus appreciated.  2.  Moderate bilateral pleural effusions. Associated linear consolidations in the lung bases suggests atelectasis, component of infiltrate not excluded.  3.  Low-density left hepatic lobe lesion, could represent small cyst or hemangioma, otherwise indeterminate. Could be followed up with three-phase CT liver for further characterization.        Level of Consciousness: Alert  Affect/Behavior: Appropriate, Calm, Uncooperative  Follows Directives: Yes - simple commands only  Orientation: Self, General place - stated [place], [place] for place; cue for successful retrieval of current month  Hearing: Functional hearing  Vision: Functional vision      Prior Living Situation & Level of Function:  Patient lives at home with spouse and two adult children per chart review. Patient is a poor historian with  increased distractibility, thus did not answer several questions.       Oral Mechanism Evaluation  Facial Symmetry: Equal  Labial Observations: WFL  Lingual Observations: Midline  Dentition: Good, Some missing dentition  Comments:      Voice  Quality: WFL  Resonance: WFL  Intensity: Appropriate  Cough: WFL  Comments:      Current Method of Nutrition   Oral diet (RG7/TN0)      Subjective  Patient did not report prior diet/services (encephalopathic, distracted). Patient commented on muted TV during interview; SLP turned off TV. Patient also with circumlocutory and halting speech. Per chart review, patient continues with improved mental status though still not baseline per family.       Assessment  Positioning: Duran's (60-90 degrees)  Bolus Administration: Patient  Oxygen Requirements:  5 L Nasal Cannula  Factor(s) Affecting Performance: Impaired mental status    Swallowing Trials  Thin Liquid (TN0): Impaired  Liquidised (LQ3): WFL  Regular (RG7): WFL    Comments: Adequate oral bolus acceptance/containment, complete AP transfer, and timely mastication. No cough/throat clear appreciated with solids or cup sip TN0. Single instance wet vocal quality with independently implemented throat clear which cleared vocal quality following straw sips TN0. Single swallow completed per bolus. The 3 ounce water challenge is a swallow screen which if passed has a predictive rate of 96% sensitivity for identifying individuals safe to swallow (Rolandr et al, 2008). Patient did not follow directions to drink entirety of cup without stopping. Patient instead took 3 semi-consecutive sips to finish cup; no cough/throat clear/wet vocal quality appreciated. Patient handed applesauce cup, which he opened adequately and then took a sip of, despite spoon in SLP hand in view. SLP prompted patient to use spoon, which patient did. Patient demonstrates adequate self-feeding, but would benefit from distant supervision at this time. RN present for  medication administration, which patient took whole with liquid wash without difficulty.       Clinical Impressions  Patient presents with clinical indicators of laryngeal penetration/aspiration with thin liquid straw sips. No dysphonia or dysarthria. Patient with word finding difficulty, characterized by circumlocutory, halting speech. Presentation is consistent with AMS/acute encephalopathy. Patient will benefit from follow up with service to ensure diet tolerance.       Recommendations  1.  Regular/thin liquids - NO STRAWS  2.  Instrumentation: Instrumental swallow study pending clinical progress  3.  Swallowing Instructions & Precautions:   Supervision: Distant supervision - check on patient 2-3 times per meal  Positioning: Fully upright and midline during oral intake  Medication: Whole with liquid  Strategies: Small bites/sips, Alternate bites and sips  Oral Care: BID  4.  Following for dysphagia management and completion of cognitive linguistic evaluation orders       Plan    Recommend Speech Therapy 3 times per week until therapy goals are met for the following treatments:  Dysphagia Training.    Discharge Recommendations: Recommend post-acute placement for additional speech therapy services prior to discharge home (TBD dependent on secondary evaluation (cognitive-linguistic))    Objective       10/11/22 1054   Charge Group   SLP Oral Pharyngeal Evaluation Oral Pharyngeal Evaluation   Total Treatment Time   SLP Time Spent Yes   SLP Oral Pharyngeal Evaluation (Mins) 24   SLP Total Time Spent 24   Initial Contact Note    Initial Contact Note  Order Received and Verified, Speech Therapy Evaluation in Progress with Full Report to Follow.   Precautions   Precautions Fall Risk;Swallow Precautions ( See Comments)   Vitals   O2 (LPM) 5   O2 Delivery Device Silicone Nasal Cannula   Pain 0 - 10 Group   Therapist Pain Assessment Post Activity Pain Same as Prior to Activity;0   Prior Level Of Function   Patient's Primary  Language English   Dysphagia Rating   Nutritional Liquid Intake Rating Scale Non thickened beverages   Nutritional Food Intake Rating Scale Total oral diet with no restrictions   Patient / Family Goals   Patient / Family Goal #1 none stated, encephalopathic   Short Term Goals   Short Term Goal # 1 Patient will consume meals of RG7/TN0 with swallow precautions without overt indicators of aspiration or decline in pulmonary status.   Education Group   Education Provided Dysphagia;Role of Speech Therapy   Dysphagia Patient Response Acceptance;Demonstration;Reinforcement Needed   Role of SLP Patient Response Patient;Acceptance;Explanation;Reinforcement Needed   Problem List   Problem List Dysphagia   Anticipated Discharge Needs   Discharge Recommendations Recommend post-acute placement for additional speech therapy services prior to discharge home  (TBD dependent on secondary evaluation (cognitive-linguistic))   Therapy Recommendations Upon DC Dysphagia Training   Interdisciplinary Plan of Care Collaboration   IDT Collaboration with  Nursing   Patient Position at End of Therapy In Bed;Bed Alarm On;Call Light within Reach;Tray Table within Reach   Collaboration Comments RN updated

## 2022-10-12 ENCOUNTER — APPOINTMENT (OUTPATIENT)
Dept: RADIOLOGY | Facility: MEDICAL CENTER | Age: 57
DRG: 871 | End: 2022-10-12
Attending: HOSPITALIST
Payer: COMMERCIAL

## 2022-10-12 PROBLEM — E87.6 HYPOKALEMIA: Status: ACTIVE | Noted: 2022-10-12

## 2022-10-12 LAB
ALBUMIN SERPL BCP-MCNC: 2.9 G/DL (ref 3.2–4.9)
ALBUMIN/GLOB SERPL: 0.8 G/DL
ALP SERPL-CCNC: 59 U/L (ref 30–99)
ALT SERPL-CCNC: 71 U/L (ref 2–50)
ANION GAP SERPL CALC-SCNC: 11 MMOL/L (ref 7–16)
AST SERPL-CCNC: 37 U/L (ref 12–45)
BASOPHILS # BLD AUTO: 0.7 % (ref 0–1.8)
BASOPHILS # BLD: 0.06 K/UL (ref 0–0.12)
BILIRUB SERPL-MCNC: 0.5 MG/DL (ref 0.1–1.5)
BUN SERPL-MCNC: 23 MG/DL (ref 8–22)
C IMMITIS AB SER QL ID: NOT DETECTED
CALCIUM SERPL-MCNC: 8.3 MG/DL (ref 8.5–10.5)
CHLORIDE SERPL-SCNC: 103 MMOL/L (ref 96–112)
CO2 SERPL-SCNC: 28 MMOL/L (ref 20–33)
COCCIDIOIDES AB TITR SER CF: NORMAL {TITER}
COCCIDIOIDES IGG SER-ACNC: 0.3 IV
COCCIDIOIDES IGM SERPL-ACNC: 0.1 IV
CREAT SERPL-MCNC: 0.76 MG/DL (ref 0.5–1.4)
EOSINOPHIL # BLD AUTO: 0.38 K/UL (ref 0–0.51)
EOSINOPHIL NFR BLD: 4.1 % (ref 0–6.9)
ERYTHROCYTE [DISTWIDTH] IN BLOOD BY AUTOMATED COUNT: 42.6 FL (ref 35.9–50)
GFR SERPLBLD CREATININE-BSD FMLA CKD-EPI: 106 ML/MIN/1.73 M 2
GLOBULIN SER CALC-MCNC: 3.6 G/DL (ref 1.9–3.5)
GLUCOSE SERPL-MCNC: 124 MG/DL (ref 65–99)
HCT VFR BLD AUTO: 42.3 % (ref 42–52)
HGB BLD-MCNC: 13.2 G/DL (ref 14–18)
IMM GRANULOCYTES # BLD AUTO: 0.1 K/UL (ref 0–0.11)
IMM GRANULOCYTES NFR BLD AUTO: 1.1 % (ref 0–0.9)
LYMPHOCYTES # BLD AUTO: 3.96 K/UL (ref 1–4.8)
LYMPHOCYTES NFR BLD: 43.1 % (ref 22–41)
MAGNESIUM SERPL-MCNC: 2.3 MG/DL (ref 1.5–2.5)
MCH RBC QN AUTO: 25.8 PG (ref 27–33)
MCHC RBC AUTO-ENTMCNC: 31.2 G/DL (ref 33.7–35.3)
MCV RBC AUTO: 82.6 FL (ref 81.4–97.8)
MONOCYTES # BLD AUTO: 0.92 K/UL (ref 0–0.85)
MONOCYTES NFR BLD AUTO: 10 % (ref 0–13.4)
NEUTROPHILS # BLD AUTO: 3.77 K/UL (ref 1.82–7.42)
NEUTROPHILS NFR BLD: 41 % (ref 44–72)
NRBC # BLD AUTO: 0 K/UL
NRBC BLD-RTO: 0 /100 WBC
PHOSPHATE SERPL-MCNC: 2.5 MG/DL (ref 2.5–4.5)
PLATELET # BLD AUTO: 265 K/UL (ref 164–446)
PMV BLD AUTO: 8.9 FL (ref 9–12.9)
POTASSIUM SERPL-SCNC: 3.5 MMOL/L (ref 3.6–5.5)
PROT SERPL-MCNC: 6.5 G/DL (ref 6–8.2)
RBC # BLD AUTO: 5.12 M/UL (ref 4.7–6.1)
SODIUM SERPL-SCNC: 142 MMOL/L (ref 135–145)
WBC # BLD AUTO: 9.2 K/UL (ref 4.8–10.8)

## 2022-10-12 PROCEDURE — 770020 HCHG ROOM/CARE - TELE (206)

## 2022-10-12 PROCEDURE — 84100 ASSAY OF PHOSPHORUS: CPT

## 2022-10-12 PROCEDURE — A9270 NON-COVERED ITEM OR SERVICE: HCPCS | Performed by: INTERNAL MEDICINE

## 2022-10-12 PROCEDURE — A9576 INJ PROHANCE MULTIPACK: HCPCS | Performed by: HOSPITALIST

## 2022-10-12 PROCEDURE — 700102 HCHG RX REV CODE 250 W/ 637 OVERRIDE(OP): Performed by: STUDENT IN AN ORGANIZED HEALTH CARE EDUCATION/TRAINING PROGRAM

## 2022-10-12 PROCEDURE — 70553 MRI BRAIN STEM W/O & W/DYE: CPT

## 2022-10-12 PROCEDURE — 92523 SPEECH SOUND LANG COMPREHEN: CPT

## 2022-10-12 PROCEDURE — 80053 COMPREHEN METABOLIC PANEL: CPT

## 2022-10-12 PROCEDURE — 94660 CPAP INITIATION&MGMT: CPT

## 2022-10-12 PROCEDURE — 85025 COMPLETE CBC W/AUTO DIFF WBC: CPT

## 2022-10-12 PROCEDURE — 700102 HCHG RX REV CODE 250 W/ 637 OVERRIDE(OP): Performed by: HOSPITALIST

## 2022-10-12 PROCEDURE — A9270 NON-COVERED ITEM OR SERVICE: HCPCS | Performed by: HOSPITALIST

## 2022-10-12 PROCEDURE — A9270 NON-COVERED ITEM OR SERVICE: HCPCS | Performed by: STUDENT IN AN ORGANIZED HEALTH CARE EDUCATION/TRAINING PROGRAM

## 2022-10-12 PROCEDURE — 99232 SBSQ HOSP IP/OBS MODERATE 35: CPT | Performed by: HOSPITALIST

## 2022-10-12 PROCEDURE — 700117 HCHG RX CONTRAST REV CODE 255: Performed by: HOSPITALIST

## 2022-10-12 PROCEDURE — 700111 HCHG RX REV CODE 636 W/ 250 OVERRIDE (IP): Performed by: STUDENT IN AN ORGANIZED HEALTH CARE EDUCATION/TRAINING PROGRAM

## 2022-10-12 PROCEDURE — 700102 HCHG RX REV CODE 250 W/ 637 OVERRIDE(OP): Performed by: INTERNAL MEDICINE

## 2022-10-12 PROCEDURE — 99232 SBSQ HOSP IP/OBS MODERATE 35: CPT | Performed by: INTERNAL MEDICINE

## 2022-10-12 PROCEDURE — 83735 ASSAY OF MAGNESIUM: CPT

## 2022-10-12 PROCEDURE — 36415 COLL VENOUS BLD VENIPUNCTURE: CPT

## 2022-10-12 RX ORDER — OMEPRAZOLE 20 MG/1
20 CAPSULE, DELAYED RELEASE ORAL DAILY
Status: DISCONTINUED | OUTPATIENT
Start: 2022-10-12 | End: 2022-10-19 | Stop reason: HOSPADM

## 2022-10-12 RX ORDER — CALCIUM CARBONATE 500 MG/1
500 TABLET, CHEWABLE ORAL 3 TIMES DAILY PRN
Status: DISCONTINUED | OUTPATIENT
Start: 2022-10-12 | End: 2022-10-19 | Stop reason: HOSPADM

## 2022-10-12 RX ORDER — POTASSIUM CHLORIDE 20 MEQ/1
40 TABLET, EXTENDED RELEASE ORAL ONCE
Status: COMPLETED | OUTPATIENT
Start: 2022-10-12 | End: 2022-10-12

## 2022-10-12 RX ADMIN — DOXYCYCLINE 100 MG: 100 TABLET, FILM COATED ORAL at 17:05

## 2022-10-12 RX ADMIN — METOPROLOL SUCCINATE 25 MG: 25 TABLET, FILM COATED, EXTENDED RELEASE ORAL at 04:21

## 2022-10-12 RX ADMIN — ONDANSETRON 4 MG: 2 INJECTION INTRAMUSCULAR; INTRAVENOUS at 15:23

## 2022-10-12 RX ADMIN — OMEPRAZOLE 20 MG: 20 CAPSULE, DELAYED RELEASE ORAL at 15:43

## 2022-10-12 RX ADMIN — GADOTERIDOL 20 ML: 279.3 INJECTION, SOLUTION INTRAVENOUS at 19:48

## 2022-10-12 RX ADMIN — ASPIRIN 81 MG: 81 TABLET, COATED ORAL at 04:21

## 2022-10-12 RX ADMIN — POTASSIUM CHLORIDE 40 MEQ: 1500 TABLET, EXTENDED RELEASE ORAL at 11:25

## 2022-10-12 RX ADMIN — DOXYCYCLINE 100 MG: 100 TABLET, FILM COATED ORAL at 04:21

## 2022-10-12 RX ADMIN — FUROSEMIDE 40 MG: 10 INJECTION, SOLUTION INTRAMUSCULAR; INTRAVENOUS at 04:21

## 2022-10-12 RX ADMIN — RIVAROXABAN 10 MG: 10 TABLET, FILM COATED ORAL at 17:05

## 2022-10-12 RX ADMIN — CALCIUM CARBONATE 500 MG: 500 TABLET, CHEWABLE ORAL at 15:43

## 2022-10-12 ASSESSMENT — ENCOUNTER SYMPTOMS
VOMITING: 0
CONSTIPATION: 0
MYALGIAS: 0
NERVOUS/ANXIOUS: 0
NAUSEA: 0
COUGH: 0
ABDOMINAL PAIN: 0
SHORTNESS OF BREATH: 0
FOCAL WEAKNESS: 0
DIARRHEA: 0

## 2022-10-12 NOTE — PROGRESS NOTES
Infectious Disease Progress Note    Author: Genoveva Mccabe M.D. Date & Time of service: 10/12/2022  8:45 AM       Chief Complaint:  Encephalopathy, concern for encephalitis associated with potential tickborne illness     Interval History:  AF, O2 5 L NC, quite somnolent during my exam and did not respond.  Continued on doxycycline and will stop linezolid as unclear need.    Review of Systems:  Review of Systems   Respiratory:  Negative for cough and shortness of breath.    Gastrointestinal:  Negative for abdominal pain, constipation, diarrhea, nausea and vomiting.   Musculoskeletal:  Negative for myalgias.   Neurological:  Negative for focal weakness.   Psychiatric/Behavioral:  The patient is not nervous/anxious.      Hemodynamics:  Temp (24hrs), Av.6 °C (97.9 °F), Min:36.6 °C (97.9 °F), Max:36.6 °C (97.9 °F)  Temperature: 36.6 °C (97.9 °F), Monitored Temp: 36.6 °C (97.9 °F)  Pulse  Av.7  Min: 56  Max: 145   Blood Pressure: 132/82       Physical Exam:  Physical Exam  Cardiovascular:      Rate and Rhythm: Normal rate.   Pulmonary:      Effort: Pulmonary effort is normal.   Abdominal:      General: Abdomen is flat.   Musculoskeletal:         General: Normal range of motion.   Skin:     General: Skin is warm and dry.   Neurological:      Comments: Patient working with therapist, moving all extremities, responding, not at baseline per wife   Psychiatric:         Mood and Affect: Mood normal.         Behavior: Behavior normal.       Meds:    Current Facility-Administered Medications:     metoprolol SR    rivaroxaban    aspirin EC    furosemide    doxycycline monohydrate    senna-docusate **AND** polyethylene glycol/lytes **AND** magnesium hydroxide **AND** bisacodyl    acetaminophen    ondansetron    Labs:  Recent Labs     10/10/22  0316 10/11/22  0442 10/12/22  0507   WBC 13.5* 11.5* 9.2   RBC 5.18 5.26 5.12   HEMOGLOBIN 13.3* 13.5* 13.2*   HEMATOCRIT 43.4 44.1 42.3   MCV 83.8 83.8 82.6   MCH 25.7* 25.7*  25.8*   RDW 44.9 44.5 42.6   PLATELETCT 244 278 265   MPV 9.4 9.6 8.9*   NEUTSPOLYS 52.60 43.10* 41.00*   LYMPHOCYTES 38.60 49.10* 43.10*   MONOCYTES 7.90 7.80 10.00   EOSINOPHILS 0.90 0.00 4.10   BASOPHILS 0.00 0.00 0.70   RBCMORPHOLO Normal Present  --      Recent Labs     10/10/22  0316 10/11/22  0442 10/12/22  0507   SODIUM 138 139 142   POTASSIUM 4.3 3.9 3.5*   CHLORIDE 100 99 103   CO2 30 31 28   GLUCOSE 130* 127* 124*   BUN 19 24* 23*     Recent Labs     10/10/22  0316 10/11/22  0442 10/12/22  0507   ALBUMIN 2.8* 2.9* 2.9*   TBILIRUBIN 0.4 0.5 0.5   ALKPHOSPHAT 74 69 59   TOTPROTEIN 6.9 6.8 6.5   ALTSGPT 90* 83* 71*   ASTSGOT 41 46* 37   CREATININE 0.71 0.69 0.76       Imaging:  CT-HEAD W/O    Result Date: 10/4/2022  10/4/2022 5:23 PM HISTORY/REASON FOR EXAM:  Seizure, new-onset, no history of trauma. TECHNIQUE/EXAM DESCRIPTION AND NUMBER OF VIEWS: CT of the head without contrast. The study was performed on a helical multidetector CT scanner. Contiguous 5 mm axial sections were obtained from the skull base through the vertex. Up to date radiation dose reduction adjustments have been utilized to meet ALARA standards for radiation dose reduction. COMPARISON:  None FINDINGS: CALVARIA:  The calvaria are normal in appearance. BRAIN:  The brain parenchyma is normal in appearance. VENTRICULAR SYSTEM: Ventricular system is normal in size and position. There is no hemorrhage or evidence for acute infarct. There are no extra-axial fluid collection. Sinuses:  The paranasal sinuses are clear. The mastoid air cells and middle ear are clear. The orbital contents are normal in appearance.     Negative noncontrast CT scan of the head / brain.     DX-CHEST-PORTABLE (1 VIEW)    Result Date: 10/4/2022  10/4/2022 2:02 PM HISTORY/REASON FOR EXAM:  Shortness of Breath. TECHNIQUE/EXAM DESCRIPTION AND NUMBER OF VIEWS: Single portable view of the chest. COMPARISON: None FINDINGS: Endotracheal tube terminates 2.1 cm of the matty.  Right IJ catheter tip projects in the SVC. Cardiomediastinal silhouette is normal. Diffuse airspace and probable interstitial opacities bilaterally. Layering small pleural effusions are probably present. No pneumothorax. No acute osseous abnormality.     1.  Satisfactory lines and tubes as detailed. 2.  Diffuse bilateral pulmonary opacities are nonspecific, may be related to extensive pulmonary edema or infection. 3.  There may be layering small pleural effusions. No pneumothorax.    CT-CTA CHEST PULMONARY ARTERY W/ RECONS    Result Date: 10/5/2022  10/5/2022 10:05 PM HISTORY/REASON FOR EXAM:  Fever, dyspnea, tachycardia. Symptoms worsening TECHNIQUE/EXAM DESCRIPTION:  CT angiogram of the chest with contrast. Transaxial MDCT scan of chest post bolus 45 cc Omnipaque 350 IV administration. MIP reconstructed images were created and reviewed. Low dose optimization technique was utilized for this CT exam including automated exposure control and adjustment of the mA and/or kV according to patient size. COMPARISON: None FINDINGS: The visualized portion of the thyroid appear within normal limits.  The trachea and main stem airways are normal in caliber. There are no pathologically enlarged mediastinal lymph nodes. The heart and pericardium appear within normal limits.   The aorta and its main branch vessels are normal in caliber and configuration.  The pulmonary arteries are well opacified without visualized filling defect. The pulmonary parenchyma demonstrates linear consolidations in the bilateral lung bases. There is moderate bilateral pleural effusion seen. Limited views of the abdomen demonstrate no acute abnormality. 11.6 mm low-density left hepatic lobe lesion is seen. The bony structures are age appropriate.     1.  No pulmonary embolus appreciated. 2.  Moderate bilateral pleural effusions. Associated linear consolidations in the lung bases suggests atelectasis, component of infiltrate not excluded. 3.  Low-density  left hepatic lobe lesion, could represent small cyst or hemangioma, otherwise indeterminate. Could be followed up with three-phase CT liver for further characterization.    EC-ECHOCARDIOGRAM COMPLETE W/O CONT    Result Date: 10/8/2022  Transthoracic Echo Report Echocardiography Laboratory CONCLUSIONS No prior study is available for comparison. The left ventricular ejection fraction is visually estimated to be 55%. Unable to estimate right ventricular systolic pressure due to an inadequate tricuspid regurgitant jet. LEANDRA JONES Exam Date:         10/07/2022                    14:26 Exam Location:     Inpatient Priority:          Routine Ordering Physician:        KHRIS MADERA Referring Physician:       [id number] Jensen Sonographer:               Jake ACEVEDO Age:    57     Gender:    M MRN:    [id number] :    [id number] BSA:    2.64   Ht (in):    75     Wt (lb):    309 Exam Type:     Complete Indications:     Congestive Heart Failure ICD Codes:       428 CPT Codes:       20054 BP:   86     /   54     HR: Technical Quality:       Fair MEASUREMENTS  (Male / Female) Normal Values 2D ECHO LV Diastolic Diameter PLAX        5.2 cm                4.2 - 5.9 / 3.9 - 5.3 cm LV Systolic Diameter PLAX         3.8 cm                2.1 - 4.0 cm IVS Diastolic Thickness           1.4 cm                LVPW Diastolic Thickness          1.2 cm                LVOT Diameter                     2 cm                  Estimated LV Ejection Fraction    55 %                  LV Ejection Fraction MOD BP       51.4 %                >= 55  % LV Ejection Fraction MOD 4C       56.2 %                LV Ejection Fraction MOD 2C       44.3 %                DOPPLER AV Peak Velocity                  1.5 m/s               AV Peak Gradient                  9.3 mmHg              AV Mean Gradient                  4.8 mmHg              LVOT Peak Velocity                0.81 m/s              AV Area Cont Eq  vti               2.1 cm2               MV Velocity Time Integral         21.7 cm               Mitral E Point Velocity           0.93 m/s              Mitral E to A Ratio               0.98                  Mitral A Duration                 142 ms                MV Pressure Half Time             81.8 ms               MV Area PHT                       2.7 cm2               MV Deceleration Time              242 ms                PV Peak Velocity                  1.1 m/s               PV Peak Gradient                  5.3 mmHg              RVOT Peak Velocity                0.96 m/s              * Indicates values subject to auto-interpretation LV EF:  55    % FINDINGS Left Ventricle Normal left ventricular chamber size. Mild concentric left ventricular hypertrophy. Normal left ventricular systolic function. The left ventricular ejection fraction is visually estimated to be 55%. Normal regional wall motion. Indeterminate diastolic function. Right Ventricle The right ventricle is normal in size and systolic function. Right Atrium The right atrium is normal in size. Normal inferior vena cava size and inspiratory collapse. Left Atrium The left atrium is normal in size. Left atrial volume index is 17.2 mL/sq m. Mitral Valve Structurally normal mitral valve without significant stenosis or regurgitation. Aortic Valve Structurally normal aortic valve without significant stenosis or regurgitation. Tricuspid Valve Structurally normal tricuspid valve without significant stenosis. Trace tricuspid regurgitation. Unable to estimate right ventricular systolic pressure due to an inadequate tricuspid regurgitant jet. Pulmonic Valve Structurally normal pulmonic valve without significant stenosis or regurgitation. Pericardium Normal pericardium without effusion. Aorta Normal aortic root for body surface area. The ascending aorta diameter is 3.2 cm. Cam Horvath MD (Electronically Signed) Final Date:     08 October 2022             "     06:44    DX-ABDOMEN FOR TUBE PLACEMENT    Result Date: 10/5/2022  10/5/2022 4:54 PM HISTORY/REASON FOR EXAM:  Line evaluation. TECHNIQUE/EXAM DESCRIPTION AND NUMBER OF VIEWS:  1 view(s) of the abdomen. COMPARISON:  None. FINDINGS: Enteric tube has been placed. The tip projects over the left upper abdomen. The bowel gas pattern is within normal limits. Limited imaging of the chest demonstrates satisfactory appearance of an ET tube and right vascular catheter. There are diffuse bilateral parenchymal hazy opacities.     1.  Enteric tube overlies the proximal stomach.      Micro:  Results       Procedure Component Value Units Date/Time    BLOOD CULTURE [[id number]] Collected: 10/04/22 1422    Order Status: Completed Specimen: Blood from Peripheral Updated: 10/09/22 1700     Significant Indicator NEG     Source BLD     Site PERIPHERAL     Culture Result No growth after 5 days of incubation.    Narrative:      Per Hospital Policy: Only change Specimen Src: to \"Line\" if  specified by physician order.  No site indicated    BLOOD CULTURE [[id number]] Collected: 10/04/22 1420    Order Status: Completed Specimen: Blood from Peripheral Updated: 10/09/22 1700     Significant Indicator NEG     Source BLD     Site PERIPHERAL     Culture Result No growth after 5 days of incubation.    Narrative:      Per Hospital Policy: Only change Specimen Src: to \"Line\" if  specified by physician order.  Left Wrist    CSF Culture [[id number]]     Order Status: No result Specimen: CSF from Tap     CULTURE RESPIRATORY W/ GRM STN [[id number]] Collected: 10/04/22 1414    Order Status: Completed Specimen: Respirate from Sputum Updated: 10/06/22 1119     Significant Indicator NEG     Source RESP     Site Tracheal Aspirate     Culture Result No growth at 48 hours.     Gram Stain Result Few WBCs.  No organisms seen.      Narrative:      Collected By: [id number] YANCI MAY  Collected By: [id number] STEFFANIE FRANCO  Collected By: [id number] YANCI BOLIVAR " LALO    URINE CULTURE(NEW) [[id number]] Collected: 10/04/22 1415    Order Status: Completed Specimen: Urine, Martin Cath Updated: 10/06/22 0721     Significant Indicator NEG     Source UR     Site URINE, MARTIN CATH     Culture Result No growth at 48 hours.    Narrative:      Collected By: [id number] YANCI MAY  Indication for culture:->Patient WITHOUT an indwelling Martin  catheter in place with new onset of Dysuria, Frequency,  Urgency, and/or Suprapubic pain  Collected By: [id number] YANCI MAY  UTILIZATION ALERT: Has Flu/RSV/COVID PCR testing been  performed, resulted reviewed, and consulted with Infectious  Diseases or PICU Provider Teams?->Yes  Have you been in close contact with a person who is suspected  or known to be positive for COVID-19 within the last 30 days  (e.g. last seen that person < 30 days ago)->Yes  Collected By: [id number] YANCI MAY            Assessment:  Active Hospital Problems    Diagnosis     *Seizure, possible meningitis/h/o tick bite, hypoxia/par Afib/anticoagulation (held) (HCC) [R56.9]     GERD (gastroesophageal reflux disease) [K21.9]     Altered mental status [R41.82]     Pleural effusion, bilateral [J90]     Elevated liver enzymes [R74.8]     Acute respiratory failure with hypoxia (HCC) [J96.01]     Paroxysmal A-fib (HCC) [I48.0]     Tick bite of left lower leg [S80.862A, W57.XXXA]      Interval 24 hours:      AF, O2 RA  Labs reviewed  Imaging personally reviewed both images and report.   Micro reviewed    Patient alert today and working with therapy.  Per wife he is still quite confused and well she is seeing some improvement he is well below his baseline.  Continued on doxycycline as below.    Assessment:  Josh Trivedi is a 57 y.o. male with past medical history significant for paroxysmal A. fib on Xarelto, obesity, ALEXANDER and no history of epilepsy admitted 10/4/2022 from [facility] ER for dyspnea, rash, and history of tick bite.  He is initially quite  encephalopathic and required intubation.  Per notes he had been in [place] approximately 2 weeks prior to abdomen and noticed a tick on his ankle which was immediately removed.  He reports a petechial rash as well as headaches and neck pain.  Rash had improved.  He presented to [facility] and was admitted.  During his care he had a witnessed tonic-clonic seizure and was intubated.  Per report LP was done at [facility] with mildly elevated protein at 57, glucose at 70, no report of cell count.  normal WBC normal glucose but elevated protein.  He has had persistent elevated liver enzymes.  He had increased on ceftriaxone so the concern was that this potentially was contributing.  Ceftriaxone was stopped and they have improved but are still elevated.      Problems:     Leukocytosis, improved  Encephalopathy, ongoing, per wife his mentation has improved but he is well below baseline    -Concern for encephalitis, however we were able to confirm the WBC from LP done at outside facility of 3 which is not within the range that would be suspected for bacterial or viral encephalitis.  There was some mildly elevated protein per report (57) and West Nile would be a consideration but this was not sent  Tick bite  Suspected RMSF, due to tick bite and rash, however antibodies are negative  -CSF cultures drawn at [facility] are no growth  -Serum crag-negative  -Lyme serology-pending  -Rickettsia Rickettsia high serology-neg   -Cocci serology-pending  -Aspergillus galactomannan-negative   -Fungitell-intermediate  -Urine Legionella and Streptococcus antigen-negative  -HIV negative  -CSF Borrelia burgdorferi reflex panel PCR - negative   --Respiratory viral panel negative  --Syphilis screen negative  --CMV quant pending  Transaminitis, improving   Reported seizure  Paroxysmal A. Fib      Plan:     --- Continue doxycycline 100 mg BID for a 14 day course (end 10/21/22)   ---  Follow-up any outstanding testing but so far there is no clear infectious explanation for his symptoms  --- Recommend MRI head with contrast, pending   --- Recommend neurology reevaluation as I have no clear infectious source for his presentation.  If this was due to a tickborne disease it should have improved with doxycycline.Case reports of encephalitis with B. Miyamotoi but also should respond to doxy.      Discussed with internal medicine, Dr. Fide Hickman. ID will follow.

## 2022-10-12 NOTE — CARE PLAN
The patient is Stable - Low risk of patient condition declining or worsening    Shift Goals  Clinical Goals: Utilize call bell/safety  Patient Goals: Rest  Family Goals: MRI of the brain    Progress made toward(s) clinical / shift goals:     Problem: Knowledge Deficit - Standard  Goal: Patient and family/care givers will demonstrate understanding of plan of care, disease process/condition, diagnostic tests and medications  Outcome: Progressing  Education provided regarding POC.     Problem: Fall Risk  Goal: Patient will remain free from falls  Outcome: Progressing  Bed low and locked, alarm set, call bell within reach. Telesitter ordered/at bedside due to pt's impulsivity.       Patient is not progressing towards the following goals: N/A

## 2022-10-12 NOTE — PROGRESS NOTES
Monitor Summary: SR 71-87, IA 0.14, QRS 0.09, QT 0.34, with rare PACs and PVCs per strip from monitor room.

## 2022-10-12 NOTE — PROGRESS NOTES
Hospital Medicine Daily Progress Note    Date of Service  10/12/2022    Chief Complaint  Josh Trivedi is a 57 y.o. male admitted 10/4/2022 with No chief complaint on file.      Hospital Course  No notes on file  Josh Trivedi is a 57 y.o. male with a history of obesity, sleep apnea, paroxysmal atrial fibrillation on Xarelto who presented 10/4/2022 with transfer from [facility] on 10/2 for worsening shortness of breath fevers and headache.  Upon admission he had been intubated and sedated due to witnessed seizure on 10/4 with altered mental status and hypoxia.     Patient is from [place] and approximately 2 weeks prior to admission had been bitten by a tick on the left lower extremity.  He had a rash of his bilateral legs associated with fever and headaches.  He went to an urgent care and was diagnosed with prostatitis and given antibiotics but he did not improve.  He went to the emergency room on 9/30 for fevers chills shortness of breath and headache.  Patient was discharged but presented again on 10/2 which led to current hospitalization.  At outside hospital he was worked up for potential tickborne illness including Lyme disease, [place] spotted fever, and meningitis.  Lumbar puncture obtained and CSF cultures, and protein (elevated 57), cell count and normal CSF glucose.  He has had elevated liver enzymes.  Patient was negative for COVID, HIV.  Infectious diseases been consulting and has concern of [place] spotted fever.      Interval Problem Update      Patient is alert he is oriented to self he knows in the hospital thinks he is in [place] knows the year  Denies headache  Complains of heartburn  On 2 L nasal cannula      I have discussed this patient's plan of care and discharge plan at IDT rounds today with Case Management, Nursing, Nursing leadership, and other members of the IDT team.    Consultants/Specialty  Intensivist admitted  Hospitalist  ID      Code Status  Full  Code    Disposition  Patient is not medically cleared for discharge.   Anticipate discharge to  TBD .  I have placed the appropriate orders for post-discharge needs.    Review of Systems  Review of Systems   Unable to perform ROS: Medical condition      Physical Exam  Temp:  [36.6 °C (97.9 °F)] 36.6 °C (97.9 °F)  Pulse:  [77-85] 84  Resp:  [17-18] 18  BP: (125-134)/(80-84) 126/80  SpO2:  [86 %-96 %] 90 %    Physical Exam  Vitals and nursing note reviewed.   Constitutional:       Appearance: He is well-developed. He is not diaphoretic.   HENT:      Head: Normocephalic and atraumatic.      Mouth/Throat:      Pharynx: No oropharyngeal exudate.   Eyes:      General: No scleral icterus.        Right eye: No discharge.         Left eye: No discharge.      Conjunctiva/sclera: Conjunctivae normal.      Pupils: Pupils are equal, round, and reactive to light.   Neck:      Vascular: No JVD.      Trachea: No tracheal deviation.   Cardiovascular:      Rate and Rhythm: Normal rate and regular rhythm.      Heart sounds: No murmur heard.    No friction rub. No gallop.   Pulmonary:      Effort: Pulmonary effort is normal. No respiratory distress.      Breath sounds: Normal breath sounds. No stridor. No wheezing.   Chest:      Chest wall: No tenderness.   Abdominal:      General: Bowel sounds are normal. There is no distension.      Palpations: Abdomen is soft.      Tenderness: There is no abdominal tenderness. There is no rebound.   Musculoskeletal:         General: No tenderness.      Cervical back: Neck supple.   Skin:     General: Skin is warm and dry.      Nails: There is no clubbing.   Neurological:      Mental Status: He is alert.      Cranial Nerves: No cranial nerve deficit.      Motor: No weakness or abnormal muscle tone.      Comments: Oriented x2   Psychiatric:         Behavior: Behavior normal.       Fluids    Intake/Output Summary (Last 24 hours) at 10/12/2022 1610  Last data filed at 10/11/2022 1700  Gross per 24 hour    Intake --   Output 275 ml   Net -275 ml         Laboratory  Recent Labs     10/10/22  0316 10/11/22  0442 10/12/22  0507   WBC 13.5* 11.5* 9.2   RBC 5.18 5.26 5.12   HEMOGLOBIN 13.3* 13.5* 13.2*   HEMATOCRIT 43.4 44.1 42.3   MCV 83.8 83.8 82.6   MCH 25.7* 25.7* 25.8*   MCHC 30.6* 30.6* 31.2*   RDW 44.9 44.5 42.6   PLATELETCT 244 278 265   MPV 9.4 9.6 8.9*       Recent Labs     10/10/22  0316 10/11/22  0442 10/12/22  0507   SODIUM 138 139 142   POTASSIUM 4.3 3.9 3.5*   CHLORIDE 100 99 103   CO2 30 31 28   GLUCOSE 130* 127* 124*   BUN 19 24* 23*   CREATININE 0.71 0.69 0.76   CALCIUM 8.2* 8.6 8.3*                         Imaging  EC-ECHOCARDIOGRAM COMPLETE W/O CONT   Final Result      CT-CTA CHEST PULMONARY ARTERY W/ RECONS   Final Result         1.  No pulmonary embolus appreciated.   2.  Moderate bilateral pleural effusions. Associated linear consolidations in the lung bases suggests atelectasis, component of infiltrate not excluded.   3.  Low-density left hepatic lobe lesion, could represent small cyst or hemangioma, otherwise indeterminate. Could be followed up with three-phase CT liver for further characterization.      DX-ABDOMEN FOR TUBE PLACEMENT   Final Result      1.  Enteric tube overlies the proximal stomach.      CT-HEAD W/O   Final Result      Negative noncontrast CT scan of the head / brain.         DX-CHEST-PORTABLE (1 VIEW)   Final Result      1.  Satisfactory lines and tubes as detailed.   2.  Diffuse bilateral pulmonary opacities are nonspecific, may be related to extensive pulmonary edema or infection.   3.  There may be layering small pleural effusions. No pneumothorax.      MR-BRAIN-WITH & W/O    (Results Pending)          Assessment/Plan  * Seizure, possible meningitis/h/o tick bite, hypoxia/par Afib/anticoagulation (held) (HCC)- (present on admission)  Assessment & Plan  Initial concern of potential CSF infection     Discussed with Dr. Mccabe from ID   Continue doxycycline complete 2 weeks  "course  Follow-up an MRI of brain      Hypokalemia  Assessment & Plan  Replete and monitor     GERD (gastroesophageal reflux disease)  Assessment & Plan  Prilosec and Tums as needed    Elevated liver enzymes  Assessment & Plan  Abdominal exam is benign  LFTs trended down    Altered mental status  Assessment & Plan   acute encephalopathy    Empirically treated with antibiotics given recent tick infection  Discussed with ID LP done at outside facility not consistent with meningitis   continue empiric treatment with doxycycline and repeat MRI of brain given persistent symptoms    Tick bite of left lower leg  Assessment & Plan      Serologies were negative  Lyme PCR negative      Paroxysmal A-fib (HCC)  Assessment & Plan  CHADSVasc score:1  Holding anticoagulation for now.  On low-dose aspirin reviewed with patient's wife  10/7 Echo:  CONCLUSIONS  No prior study is available for comparison.   The left ventricular ejection fraction is visually estimated to be 55%.  Unable to estimate right ventricular systolic pressure due to an   inadequate tricuspid regurgitant jet.\"      HR stable  CHADSVasc 1 means apsirin alone is an option. Explained to family    Acute respiratory failure with hypoxia (HCC)  Assessment & Plan  Extubated 10/7  Wean off oxygen as tolerated  Incentive spirometry use       VTE prophylaxis: Xarelto 10 mg daily as prophylaxis    I have performed a physical exam and reviewed and updated ROS and Plan today (10/12/2022). In review of yesterday's note (10/11/2022), there are no changes except as documented above.        "

## 2022-10-12 NOTE — THERAPY
Missed Therapy     Patient Name: Josh Trivedi  Age:  57 y.o., Sex:  male  Medical Record #: [id number]  Today's Date: 10/12/2022    Pt off floor for MRI, will hold and follow up as appropriate/able for OT evaluation.      Franko Jasso OTD, OTR/L

## 2022-10-12 NOTE — THERAPY
Missed Therapy     Patient Name: Josh Trivedi  Age:  57 y.o., Sex:  male  Medical Record #: [id number]  Today's Date: 10/12/2022       10/12/22 1524   Interdisciplinary Plan of Care Collaboration   Collaboration Comments Attempted to initiate PT eval this date; however, pt remains off the floor for MRI. Will re-attempt later as time allows and pt available.

## 2022-10-12 NOTE — PROGRESS NOTES
Assumed care of pt at 0700. Pt alert and oriented x4 with a flat affect. He can be very forgetful/impulsive/does not utilize call bell. Telesitter ordered and brought to bedside to maintain safety. Pt denies any pain or discomfort. Wife at bedside. Awaiting MRI. Bed low and locked, alarm set and call bell within reach. Hourly rounding continues.

## 2022-10-12 NOTE — PROGRESS NOTES
Monitor Summary: SR/ST  NC 0.18 QRS 0.08 QT 0.34 with rare PVCs per strip from monitor room.

## 2022-10-12 NOTE — THERAPY
"Speech Language Pathology   Initial Assessment     Patient Name: Josh Trivedi  AGE:  57 y.o., SEX:  male  Medical Record #: [id number]  Today's Date: 10/12/2022     Precautions  Precautions: Fall Risk, Swallow Precautions ( See Comments)    HPI: Pt is a 57 y.o. man admitted on 10/4/22 as a transfer from [facility] for worsening SOB, fevers, and headache; hypoxic, witnessed seizure, intubated 10/4-10/7 (76 hours). Labs currently negative for infectious disease per MD. MRI ordered. CSE 10/11: RG7/TN0 w/ precautions    CMH: seizure, possible meningitis, hypoxia, afib, elevated liver enzymres, petechial rash, pleural effusion bilateral, AMS (acute encephalopathy), tick bite L lower leg, acute respiratory failure.      PMH: obesity, sleep apnea.    10/4/22 CT Head: Negative noncontrast CT scan of the head / brain.    Level of Consciousness: Alert, Awake  Affect/Behavior: Calm, Cooperative, Flat  Follows Directives: Yes - simple commands only  Orientation: Self, , Situation, oriented to hospital but NOT oriented to city/state  Hearing: Functional hearing  Vision: Functional vision, Wears corrective lenses      Prior Living Situation & Level of Function:  Per wife, pt lives in [place] w/ his wife and adult child. 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    Subjective  Pt agreeable and cooperative w/ SLP evaluation. Wife reports that pt's alertness is improved. Pt expressed concern for ability to return to I w/ IADLSs and RTW, stating \"taking on issues with cognitive secondary to seizures and problems coming throughout with the issues in terms of keeping things moving along.\"    Assessment  The pt was seen for a cognitive evaluation. Portions of the Cognistat and other informal measures were administered by this SLP. Results are as follows:     Cognistat  Orientation: Severe  Attention: Mild  Comprehension: Mild  Repetition: Average  Naming: Average   Of note - pt needed mod cues to attend to and initiate " "naming task  Memory: Severe  Calculations: Severe   Not completed d/t prolonged delayed response and severity of impairment despite max cues   Similarities: Severe  Judgment: Severe   Modified d/t pt unable to problem solve for simple, functional judgement questions    Clock Drawing  Pt unable to initiate clock drawing task independently. Following max cues, pt jennifer clock w/ demonstration of severely impaired organization and planning, memory, and sequencing. Per CLQT protocol, pt jennifer clock w/ a score of 7/13, severely impaired. Errors as follows: numbers drawn outside the clock borders, no hands drawn.     Medication Management  When asked what questions he would want to ask his doctor/pharmacist given a new prescription, pt stated \"side effects\". No other answer provided given probes from SLP and wife x5, indicative of impaired problem solving, abstraction, and divergent thinking. When asked how he would remember to take his medication, pt answered \"Just take it.\"    Comments  Pt w/ significant word finding deficits, evidenced by empty speech during conversation and in picture description task, long delays, and circumlocution w/o resolution. Pt unable to accurately answer all biographical questions, including stating that he lived with \"me, my wife, Reece, and a collection of people who...\" Pt's wife then reported there are only three people living in their household. Pt required mod verbal cues to initiate tasks given visual stimuli. Severely delayed response time despite repeat prompts and impaired divided attention throughout. Some emotional lability noted. When asked what he should do when he needs to use the restroom in the hospital, pt stated \"Just get up and go.\" Given max cues from SLP and wife re fall risk and hospital safety, pt identified call button. Pt then pressed call button, despite instructions from wife and SLP not to.     Clinical Impressions  Pt presents w/ severe cognitive deficits, " evidenced by impairments in orientation, memory, judgement, and executive functioning. Given severity of deficits and poor safety awareness, pt will benefit from skilled SLP services in the acute and post-acute settings and will need direct A w/ IADLs upon discharge. Wife and RN aware and in agreement.    Of note, cognitive-linguistic evaluations assess performance on various cognitive-linguistic domains such as expressive and receptive language, attention, memory, executive function, problem solving, etc. It is not within the scope of Speech Language Pathologists to determine capacity, please defer to medical team or psych for capacity evaluation. Thank you.         Recommendations  Supervision Needs Upon Discharge: The pt will benefit from direct assistance for the following IADLs: Medication management, Financial management, Appointment management, Household chores, Cooking.  2.   Effective Communication Strategies: repetition/reinforcement of directive, 1-step directions, reorientation  3.   SLP will follow to ensure tolerance of current diet and to provided skilled intervention for cognition.       Plan    Recommend Speech Therapy 4 times per week until therapy goals are met for the following treatments:  Dysphagia Training, Comprehension Training, Expression Training, Cognitive-Linguistic Training, and Patient / Family / Caregiver Education.    Discharge Recommendations: Recommend post-acute placement for additional speech therapy services prior to discharge home (TBD dependent on secondary evaluation (cognitive-linguistic))    Objective   10/12/22 1035   Initial Contact Note    Initial Contact Note  Order Received and Verified, Speech Therapy Evaluation in Progress with Full Report to Follow.   Vitals   O2 (LPM) 2   O2 Delivery Device Silicone Nasal Cannula   Prior Living Situation   Prior Services None   Lives with - Patient's Self Care Capacity Adult Children;Significant Other   Prior Level Of Function  "  Communication Within Functional Limits   Swallow Within Functional Limits   Patient's Primary Language English   Verbal Expression   Verbal Output Functional Moderate (3)   Word Finding Deficits Moderate (3)   Auditory Comprehension   Follows Three Unit Commands Moderate (3)   Understands Simple, Structured Conversation  Minimal (4)   Understands Complex Conversation Moderate (3)   Reading Comprehension   Comments Not assessed this date; will need assessment as pt progresses   Written Expression   Functional  Writing: Simple Form Supervision (5)   Overall Legibility Supervision (5)   Cognitive-Linguistic   Level of Consciousness Alert   Orientation Level Not Oriented to Month;Not Oriented to Year;Not Oriented to Place;Not Oriented to Day   Sustained Attention Moderate (3)   Divided Attention Moderate (3)   Selective Attention Moderate (3)   Short Term Memory Profound (1)   Immediate Memory Minimal (4)   Simple Reasoning / Problem Solving Moderate (3)   Abstract Reasoning Severe (2)   Safety Awareness Severe (2)   Insight into Deficits Moderate (3)   Executive Functioning / Organization Severe (2)   Verbal Sequencing (Simple) Severe (2)   Functional Sequencing for ADL / IADLs Severe (2)   Auditory Math Severe (2)   Medication Management  Severe (2)   Clock Drawing Disorganization;Poor Planning;Other (See Comments);Omissions   Social / Pragmatic Communication   Social / Pragmatic Communication X   Comments Very delayed response time, impaired ability to hold contingent, continued conversation   Aphasia Compensatory Strategies   Compensatory Strategies Used To Communicate Phonemic Cues   Patient / Family Goals   Patient / Family Goal #1 \"Taking on issues with cognition\"   Short Term Goals   Short Term Goal # 2 Pt will correctly answer open set questions related to orientation and biographical information given min cues from SLP w/ 90% accuracy across three sessions.   Short Term Goal # 3 Pt will recall functional " information concerning safety precautions given five minute delay and min cues from SLP w/ 80% accuracy.   Short Term Goal # 4 Pt will complete word finding tasks, including naming to definition and divergent naming of 5 words in a category, given min cues from SLP w/ 80% accuracy.   Short Term Goal # 5 Pt will correctly answer functional problem-solving tasks given min cues from SLP w/ 80% accuracy in order to improve safety and independence w/ IADLs.   Education Group   Education Provided Traumatic Brain Injury / Cognitive-Linguistic;Role of Speech Therapy   Additional Comments Pt's wife w/ excellent demonstrated understanding of results and recommendaitons. Pt's white board updated to promote orientation.   Problem List   Problem List Dysphagia;Cognitive-Language Deficits;Impaired Judgement;Numerical Function Deficit;Executive Function Deficit;Memory Deficit;Expressive Language Deficit;Receptive Language Deficit;Attention Deficit;Verbal Problem Solving Deficits   Interdisciplinary Plan of Care Collaboration   IDT Collaboration with  Nursing;Physician;Family / Caregiver   Collaboration Comments RN and wife aware of results and recommendations.

## 2022-10-12 NOTE — CARE PLAN
Problem: Fall Risk  Goal: Patient will remain free from falls  Outcome: Not Progressing  Note: Patient does not call despite numerous education provided by RN. Patient also knows how to turn off his bed alarm and was found in the bathroom, on the toilet, and alarm had not gone off. Patient verbalizes understanding but continues to get up without calling. Patient also lost balance walking to bathroom and fell back into bed w/out injury at beginning of shift. Patient can be steady but also unbalanced at times.     Problem: Skin Integrity  Goal: Skin integrity is maintained or improved  Outcome: Progressing  Note: Patient much more mobile and able to walk to bathroom. Patient is more independent and BANEGAS, fine motor skills improving as well.   The patient is Watcher - Medium risk of patient condition declining or worsening    Shift Goals  Clinical Goals: Safety/mobility  Patient Goals: Rest  Family Goals: Comfort    Progress made toward(s) clinical / shift goals:  see notes for progress    Patient is not progressing towards the following goals:      Problem: Fall Risk  Goal: Patient will remain free from falls  Outcome: Not Progressing  Note: Patient does not call despite numerous education provided by RN. Patient also knows how to turn off his bed alarm and was found in the bathroom, on the toilet, and alarm had not gone off. Patient verbalizes understanding but continues to get up without calling. Patient also lost balance walking to bathroom and fell back into bed w/out injury at beginning of shift. Patient can be steady but also unbalanced at times.

## 2022-10-13 PROBLEM — G93.40 ACUTE ENCEPHALOPATHY: Status: ACTIVE | Noted: 2022-10-04

## 2022-10-13 LAB
ALBUMIN SERPL BCP-MCNC: 2.9 G/DL (ref 3.2–4.9)
ALBUMIN/GLOB SERPL: 0.8 G/DL
ALP SERPL-CCNC: 57 U/L (ref 30–99)
ALT SERPL-CCNC: 70 U/L (ref 2–50)
AMMONIA PLAS-SCNC: 21 UMOL/L (ref 11–45)
ANION GAP SERPL CALC-SCNC: 9 MMOL/L (ref 7–16)
AST SERPL-CCNC: 39 U/L (ref 12–45)
BASOPHILS # BLD AUTO: 0.8 % (ref 0–1.8)
BASOPHILS # BLD: 0.07 K/UL (ref 0–0.12)
BILIRUB SERPL-MCNC: 0.5 MG/DL (ref 0.1–1.5)
BUN SERPL-MCNC: 22 MG/DL (ref 8–22)
CALCIUM SERPL-MCNC: 8.2 MG/DL (ref 8.5–10.5)
CHLORIDE SERPL-SCNC: 105 MMOL/L (ref 96–112)
CO2 SERPL-SCNC: 27 MMOL/L (ref 20–33)
CREAT SERPL-MCNC: 0.77 MG/DL (ref 0.5–1.4)
EOSINOPHIL # BLD AUTO: 0.42 K/UL (ref 0–0.51)
EOSINOPHIL NFR BLD: 4.9 % (ref 0–6.9)
ERYTHROCYTE [DISTWIDTH] IN BLOOD BY AUTOMATED COUNT: 42.8 FL (ref 35.9–50)
GFR SERPLBLD CREATININE-BSD FMLA CKD-EPI: 106 ML/MIN/1.73 M 2
GLOBULIN SER CALC-MCNC: 3.7 G/DL (ref 1.9–3.5)
GLUCOSE SERPL-MCNC: 119 MG/DL (ref 65–99)
HCT VFR BLD AUTO: 39.9 % (ref 42–52)
HGB BLD-MCNC: 12.8 G/DL (ref 14–18)
IMM GRANULOCYTES # BLD AUTO: 0.1 K/UL (ref 0–0.11)
IMM GRANULOCYTES NFR BLD AUTO: 1.2 % (ref 0–0.9)
LYMPHOCYTES # BLD AUTO: 3.88 K/UL (ref 1–4.8)
LYMPHOCYTES NFR BLD: 45.3 % (ref 22–41)
MCH RBC QN AUTO: 26.2 PG (ref 27–33)
MCHC RBC AUTO-ENTMCNC: 32.1 G/DL (ref 33.7–35.3)
MCV RBC AUTO: 81.6 FL (ref 81.4–97.8)
MONOCYTES # BLD AUTO: 0.89 K/UL (ref 0–0.85)
MONOCYTES NFR BLD AUTO: 10.4 % (ref 0–13.4)
NEUTROPHILS # BLD AUTO: 3.21 K/UL (ref 1.82–7.42)
NEUTROPHILS NFR BLD: 37.4 % (ref 44–72)
NRBC # BLD AUTO: 0 K/UL
NRBC BLD-RTO: 0 /100 WBC
PLATELET # BLD AUTO: 256 K/UL (ref 164–446)
PMV BLD AUTO: 9 FL (ref 9–12.9)
POTASSIUM SERPL-SCNC: 3.8 MMOL/L (ref 3.6–5.5)
PROT SERPL-MCNC: 6.6 G/DL (ref 6–8.2)
RBC # BLD AUTO: 4.89 M/UL (ref 4.7–6.1)
SODIUM SERPL-SCNC: 141 MMOL/L (ref 135–145)
WBC # BLD AUTO: 8.6 K/UL (ref 4.8–10.8)

## 2022-10-13 PROCEDURE — 95816 EEG AWAKE AND DROWSY: CPT | Mod: 26 | Performed by: PSYCHIATRY & NEUROLOGY

## 2022-10-13 PROCEDURE — 4A10X4Z MONITORING OF CENTRAL NERVOUS ELECTRICAL ACTIVITY, EXTERNAL APPROACH: ICD-10-PCS | Performed by: PSYCHIATRY & NEUROLOGY

## 2022-10-13 PROCEDURE — 85025 COMPLETE CBC W/AUTO DIFF WBC: CPT

## 2022-10-13 PROCEDURE — 82140 ASSAY OF AMMONIA: CPT

## 2022-10-13 PROCEDURE — A9270 NON-COVERED ITEM OR SERVICE: HCPCS | Performed by: HOSPITALIST

## 2022-10-13 PROCEDURE — 97166 OT EVAL MOD COMPLEX 45 MIN: CPT

## 2022-10-13 PROCEDURE — A9270 NON-COVERED ITEM OR SERVICE: HCPCS | Performed by: INTERNAL MEDICINE

## 2022-10-13 PROCEDURE — 36415 COLL VENOUS BLD VENIPUNCTURE: CPT

## 2022-10-13 PROCEDURE — 99232 SBSQ HOSP IP/OBS MODERATE 35: CPT | Performed by: HOSPITALIST

## 2022-10-13 PROCEDURE — 95816 EEG AWAKE AND DROWSY: CPT | Performed by: PSYCHIATRY & NEUROLOGY

## 2022-10-13 PROCEDURE — A9270 NON-COVERED ITEM OR SERVICE: HCPCS | Performed by: STUDENT IN AN ORGANIZED HEALTH CARE EDUCATION/TRAINING PROGRAM

## 2022-10-13 PROCEDURE — 700111 HCHG RX REV CODE 636 W/ 250 OVERRIDE (IP): Performed by: STUDENT IN AN ORGANIZED HEALTH CARE EDUCATION/TRAINING PROGRAM

## 2022-10-13 PROCEDURE — 700102 HCHG RX REV CODE 250 W/ 637 OVERRIDE(OP): Performed by: HOSPITALIST

## 2022-10-13 PROCEDURE — 700102 HCHG RX REV CODE 250 W/ 637 OVERRIDE(OP): Performed by: STUDENT IN AN ORGANIZED HEALTH CARE EDUCATION/TRAINING PROGRAM

## 2022-10-13 PROCEDURE — 97163 PT EVAL HIGH COMPLEX 45 MIN: CPT

## 2022-10-13 PROCEDURE — 700102 HCHG RX REV CODE 250 W/ 637 OVERRIDE(OP): Performed by: INTERNAL MEDICINE

## 2022-10-13 PROCEDURE — 770020 HCHG ROOM/CARE - TELE (206)

## 2022-10-13 PROCEDURE — 80053 COMPREHEN METABOLIC PANEL: CPT

## 2022-10-13 RX ADMIN — RIVAROXABAN 10 MG: 10 TABLET, FILM COATED ORAL at 18:30

## 2022-10-13 RX ADMIN — FUROSEMIDE 40 MG: 10 INJECTION, SOLUTION INTRAMUSCULAR; INTRAVENOUS at 07:30

## 2022-10-13 RX ADMIN — DOXYCYCLINE 100 MG: 100 TABLET, FILM COATED ORAL at 18:30

## 2022-10-13 RX ADMIN — ASPIRIN 81 MG: 81 TABLET, COATED ORAL at 07:30

## 2022-10-13 RX ADMIN — METOPROLOL SUCCINATE 25 MG: 25 TABLET, FILM COATED, EXTENDED RELEASE ORAL at 07:29

## 2022-10-13 RX ADMIN — DOXYCYCLINE 100 MG: 100 TABLET, FILM COATED ORAL at 07:29

## 2022-10-13 RX ADMIN — SENNOSIDES AND DOCUSATE SODIUM 2 TABLET: 50; 8.6 TABLET ORAL at 18:30

## 2022-10-13 RX ADMIN — OMEPRAZOLE 20 MG: 20 CAPSULE, DELAYED RELEASE ORAL at 07:31

## 2022-10-13 ASSESSMENT — PAIN DESCRIPTION - PAIN TYPE: TYPE: ACUTE PAIN

## 2022-10-13 ASSESSMENT — COGNITIVE AND FUNCTIONAL STATUS - GENERAL
DAILY ACTIVITIY SCORE: 18
MOVING FROM LYING ON BACK TO SITTING ON SIDE OF FLAT BED: A LITTLE
MOVING TO AND FROM BED TO CHAIR: A LITTLE
SUGGESTED CMS G CODE MODIFIER MOBILITY: CK
DRESSING REGULAR LOWER BODY CLOTHING: A LITTLE
CLIMB 3 TO 5 STEPS WITH RAILING: A LITTLE
SUGGESTED CMS G CODE MODIFIER DAILY ACTIVITY: CK
EATING MEALS: A LITTLE
HELP NEEDED FOR BATHING: A LITTLE
STANDING UP FROM CHAIR USING ARMS: A LITTLE
TOILETING: A LITTLE
DRESSING REGULAR UPPER BODY CLOTHING: A LITTLE
MOBILITY SCORE: 19
PERSONAL GROOMING: A LITTLE
WALKING IN HOSPITAL ROOM: A LITTLE

## 2022-10-13 ASSESSMENT — GAIT ASSESSMENTS
DISTANCE (FEET): 100
DEVIATION: BRADYKINETIC;DECREASED HEEL STRIKE;DECREASED TOE OFF
GAIT LEVEL OF ASSIST: CONTACT GUARD ASSIST
ASSISTIVE DEVICE: FRONT WHEEL WALKER

## 2022-10-13 ASSESSMENT — PATIENT HEALTH QUESTIONNAIRE - PHQ9
SUM OF ALL RESPONSES TO PHQ9 QUESTIONS 1 AND 2: 0
2. FEELING DOWN, DEPRESSED, IRRITABLE, OR HOPELESS: NOT AT ALL
1. LITTLE INTEREST OR PLEASURE IN DOING THINGS: NOT AT ALL

## 2022-10-13 ASSESSMENT — ACTIVITIES OF DAILY LIVING (ADL): TOILETING: INDEPENDENT

## 2022-10-13 NOTE — CARE PLAN
Problem: Knowledge Deficit - Standard  Goal: Patient and family/care givers will demonstrate understanding of plan of care, disease process/condition, diagnostic tests and medications  Outcome: Progressing  Note: MRI of head completed at beginning of NOC shift. Patient continues to have delayed responses, understands he is in a hospital but forgets he is in [place].     Problem: Fall Risk  Goal: Patient will remain free from falls  Outcome: Not Progressing  Note: Patient remains impulsive for most part and does not call appropriately, despite education to use call light. Gait is becoming steadier but still stiff and unsteady at times. Fall precautions in place.   The patient is Watcher - Medium risk of patient condition declining or worsening    Shift Goals  Clinical Goals: Safety and complete MRI  Patient Goals: Rest  Family Goals: MRI of the brain    Progress made toward(s) clinical / shift goals:  see notes for progress    Patient is not progressing towards the following goals:      Problem: Fall Risk  Goal: Patient will remain free from falls  Outcome: Not Progressing  Note: Patient remains impulsive for most part and does not call appropriately, despite education to use call light. Gait is becoming steadier but still stiff and unsteady at times. Fall precautions in place.

## 2022-10-13 NOTE — PROCEDURES
VIDEO ELECTROENCEPHALOGRAM REPORT      Referring provider: Dr. Castañeda    DOS: 10/13/22 (total recording of 31 minutes).     INDICATION:  Josh Trivedi 57 y.o. male presenting with seizure     CURRENT ANTIEPILEPTIC REGIMEN: none     TECHNIQUE: 30 channel video electroencephalogram (EEG) was performed in accordance with the international 10-20 system. The study was reviewed in bipolar and referential montages. The recording examined the patient during   awake state only     DESCRIPTION OF THE RECORD:  During the wakefulness, the background showed a symmetrical 9 Hz alpha activity posteriorly with amplitude of 70 mV.  There was reactivity to eye closure/opening.  A normal anterior-posterior gradient was noted with faster beta frequencies seen anteriorly.  During drowsiness, increased theta/beta frequencies were seen.    During the sleep state,symmetrical sleep spindles and vertex sharps were seen in the leads over the central regions. No slow wave stage seen.     ACTIVATION PROCEDURES:     hyperventilation was not performed    No photic driving response but photoparoxysmal response was noted, does not outlast photic stimulation.     ICTAL AND/OR INTERICTAL FINDINGS:   No focal or generalized epileptiform activity noted. No regional slowing was seen during this routine study.  No clinical events or seizures were reported or recorded during the study.     EKG: sampling of the EKG recording demonstrated sinus rhythm.     EVENTS: photoparoxysmal response related myoclonic jerking was noted.     INTERPRETATION:    This is an abnormal video EEG recording in the awake state only.   The presence of photoparoxysmal response may carry some association with photosensitive epilepsy in the correct clinical setting. Clinical correlate is recommended.     Ayan Gomez MD  Diplomate in Neurology&Epilepsy  Office: [phone]  Fax: [phone]

## 2022-10-13 NOTE — DISCHARGE PLANNING
[facility]ination    Referral from: Dr Fide Hickman  Insurance Provider on Facesheet: Fabio  Potential Rehab Diagnosis: NTBI    Chart review indicates patient may need on going medical management and may have therapy needs to possibly meet inpatient rehab facility criteria with the goal of returning to community.    D/C support: Spouse - will need to verify.     Physiatry consultation pended per protocol.     NTBI, seizure potential tickborne illness - physiatry consult pended, awaiting OT eval. TCC will follow.     Thank you for the referral.

## 2022-10-13 NOTE — THERAPY
Physical Therapy   Initial Evaluation     Patient Name: Josh Trivedi  Age:  57 y.o., Sex:  male  Medical Record #: [id number]  Today's Date: 10/13/2022     Precautions: Fall Risk;Swallow Precautions (per SLP)    Assessment  Patient is 57 y.o. male transferred from [facility] on 10/2 2/2 worsening SOB, fever, and HA. Pt with recent tick bite, MRI of brain with no acute abnormality. Pt presents today with generalized weakness, impaired activity tolerance, and impaired cognition. He is able to negotiate bed mob, transfers, and amb short distances with FWW however quick fatigue noted with exertion. Anticipate pt will continue to progress with mob with time OOB. At this time recommend HHPT from mobility standpoint however defer to SLP for DC recommendations given cognition is primary deficit.    Plan    Recommend Physical Therapy 3 times per week until therapy goals are met for the following treatments:  Community Re-integration, Equipment, Gait Training, Neuro Re-Education / Balance, Stair Training, Therapeutic Activities, and Therapeutic Exercises    DC Equipment Recommendations: Unable to determine at this time (potential need for AD if balance/gait does not improve prior to DC)  Discharge Recommendations: Recommend home health for continued physical therapy services (however defer to SLP recommendations given cognition is primary deficit at this time)     Objective    Prior Living Situation   Prior Services None   Housing / Facility 2 Story House (bedroom/bathroom on main level)   Steps Into Home 1   Steps In Home 12   Equipment Owned None   Lives with - Patient's Self Care Capacity Spouse;Adult Children   Comments Spouse home as caregiver for adult son whom is on the spectrum and low functioning.   Prior Level of Functional Mobility   Bed Mobility Independent   Transfer Status Independent   Ambulation Independent   Distance Ambulation (Feet) (Community distances)   Assistive Devices Used None   Stairs  Independent   Comments Pt works as CRNA at [employer]   Cognition    Cognition / Consciousness X   Speech/ Communication Delayed Responses   Level of Consciousness Alert   Attention Impaired   Comments unable to answer open-ended questions regarding living situation, did better after answering a few yes/no questions, odd affect   Passive ROM Lower Body   Passive ROM Lower Body WDL   Active ROM Lower Body    Active ROM Lower Body  WDL   Strength Lower Body   Lower Body Strength  X   Gross Strength Generalized Weakness, Equal Bilaterally   Comments BLE strength 3+/5, quick fatigue with muscle fasciculations toward end of amb   Sensation Lower Body   Lower Extremity Sensation   WDL   Comments Denies N/T throughout BLE   Lower Body Muscle Tone   Lower Body Muscle Tone  WDL   Neurological Concerns   Neurological Concerns Yes   Comments given cognition   Balance Assessment   Sitting Balance (Static) Fair +   Sitting Balance (Dynamic) Fair   Standing Balance (Static) Fair -   Standing Balance (Dynamic) Fair -   Weight Shift Sitting Fair   Weight Shift Standing Fair   Comments w/ FWW   Gait Analysis   Gait Level Of Assist Contact Guard Assist   Assistive Device Front Wheel Walker   Distance (Feet) 100   # of Times Distance was Traveled 1   Deviation Bradykinetic;Decreased Heel Strike;Decreased Toe Off (fwd flexed posture)   Level of Assist with Stairs Unable to Participate (2/2 fatigue with amb)   Weight Bearing Status No restrictions   Comments Adjusted walker ht post ambulation to improve upright posture   Bed Mobility    Supine to Sit Standby Assist   Sit to Supine (Up in chair post ambulation)   Functional Mobility   Sit to Stand Standby Assist   Bed, Chair, Wheelchair Transfer Contact Guard Assist   Transfer Method Stand Step   Short Term Goals    Short Term Goal # 1 Pt will perform bed<>chair transfers with SPV no AD within 6 visits to increase OOB activity.   Short Term Goal # 2 Pt will amb >300ft with SPV no  AD within 6 visits to progress toward community distances at [facility].   Short Term Goal # 3 Pt will ascend/descend 1 step with SPV no AD within 6 visits to access main level of home upon DC.

## 2022-10-13 NOTE — DISCHARGE PLANNING
Current documentation reveals a potential for acute inpatient rehabilitation, please consider a PMR referral to assist with discharge planning.

## 2022-10-13 NOTE — PROGRESS NOTES
Hospital Medicine Daily Progress Note    Date of Service  10/13/2022    Chief Complaint  Josh Trivedi is a 57 y.o. male admitted 10/4/2022 with No chief complaint on file.      Hospital Course  No notes on file  Josh Trivedi is a 57 y.o. male with a history of obesity, sleep apnea, paroxysmal atrial fibrillation on Xarelto who presented 10/4/2022 with transfer from [facility] on 10/2 for worsening shortness of breath fevers and headache.  Upon admission he had been intubated and sedated due to witnessed seizure on 10/4 with altered mental status and hypoxia.     Patient is from [place] and approximately 2 weeks prior to admission had been bitten by a tick on the left lower extremity.  He had a rash of his bilateral legs associated with fever and headaches.  He went to an urgent care and was diagnosed with prostatitis and given antibiotics but he did not improve.  He went to the emergency room on 9/30 for fevers chills shortness of breath and headache.  Patient was discharged but presented again on 10/2 which led to current hospitalization.  At outside hospital he was worked up for potential tickborne illness including Lyme disease, [place] spotted fever, and meningitis.  Lumbar puncture obtained and CSF cultures, and protein (elevated 57), cell count and normal CSF glucose.  He has had elevated liver enzymes.  Patient was negative for COVID, HIV.  Infectious diseases been consulting and has concern of [place] spotted fever.      Interval Problem Update    Patient is alert oriented x3 Still confused otherwise  Denies headache  MRI reviewed with no acute pathology  Discussed with Dr. Castañeda from neurology recommendation is for EEG and continue monitoring as long as he is improving if not consider repeat LP and send autoimmune encephalitis panel        I have discussed this patient's plan of care and discharge plan at IDT rounds today with Case Management, Nursing, Nursing leadership, and  other members of the IDT team.    Consultants/Specialty  Intensivist admitted  Hospitalist  ID      Code Status  Full Code    Disposition  Patient is not medically cleared for discharge.   Anticipate discharge to  D .  I have placed the appropriate orders for post-discharge needs.    Review of Systems  Review of Systems   Unable to perform ROS: Medical condition      Physical Exam  Temp:  [36.3 °C (97.4 °F)-36.9 °C (98.4 °F)] 36.6 °C (97.9 °F)  Pulse:  [] 100  Resp:  [16-18] 17  BP: (108-135)/(77-88) 108/77  SpO2:  [90 %-94 %] 91 %    Physical Exam  Vitals and nursing note reviewed.   Constitutional:       Appearance: He is well-developed. He is not diaphoretic.   HENT:      Head: Normocephalic and atraumatic.      Mouth/Throat:      Pharynx: No oropharyngeal exudate.   Eyes:      General: No scleral icterus.        Right eye: No discharge.         Left eye: No discharge.      Conjunctiva/sclera: Conjunctivae normal.      Pupils: Pupils are equal, round, and reactive to light.   Neck:      Vascular: No JVD.      Trachea: No tracheal deviation.   Cardiovascular:      Rate and Rhythm: Normal rate and regular rhythm.      Heart sounds: No murmur heard.    No friction rub. No gallop.   Pulmonary:      Effort: Pulmonary effort is normal. No respiratory distress.      Breath sounds: Normal breath sounds. No stridor. No wheezing.   Chest:      Chest wall: No tenderness.   Abdominal:      General: Bowel sounds are normal. There is no distension.      Palpations: Abdomen is soft.      Tenderness: There is no abdominal tenderness. There is no rebound.   Musculoskeletal:         General: No tenderness.      Cervical back: Neck supple.   Skin:     General: Skin is warm and dry.      Nails: There is no clubbing.   Neurological:      Mental Status: He is alert and oriented to person, place, and time.      Cranial Nerves: No cranial nerve deficit.      Motor: No abnormal muscle tone.   Psychiatric:         Behavior:  Behavior normal.       Fluids    Intake/Output Summary (Last 24 hours) at 10/13/2022 1315  Last data filed at 10/13/2022 1000  Gross per 24 hour   Intake 120 ml   Output --   Net 120 ml         Laboratory  Recent Labs     10/11/22  0442 10/12/22  0507 10/13/22  0221   WBC 11.5* 9.2 8.6   RBC 5.26 5.12 4.89   HEMOGLOBIN 13.5* 13.2* 12.8*   HEMATOCRIT 44.1 42.3 39.9*   MCV 83.8 82.6 81.6   MCH 25.7* 25.8* 26.2*   MCHC 30.6* 31.2* 32.1*   RDW 44.5 42.6 42.8   PLATELETCT 278 265 256   MPV 9.6 8.9* 9.0       Recent Labs     10/11/22  0442 10/12/22  0507 10/13/22  0221   SODIUM 139 142 141   POTASSIUM 3.9 3.5* 3.8   CHLORIDE 99 103 105   CO2 31 28 27   GLUCOSE 127* 124* 119*   BUN 24* 23* 22   CREATININE 0.69 0.76 0.77   CALCIUM 8.6 8.3* 8.2*                         Imaging  MR-BRAIN-WITH & W/O   Final Result      1.  No acute abnormality.   2.  Mild cerebral volume loss.   3.  There is no hippocampal sclerosis, neuronal migrational abnormality or cortical dysplasia.      EC-ECHOCARDIOGRAM COMPLETE W/O CONT   Final Result      CT-CTA CHEST PULMONARY ARTERY W/ RECONS   Final Result         1.  No pulmonary embolus appreciated.   2.  Moderate bilateral pleural effusions. Associated linear consolidations in the lung bases suggests atelectasis, component of infiltrate not excluded.   3.  Low-density left hepatic lobe lesion, could represent small cyst or hemangioma, otherwise indeterminate. Could be followed up with three-phase CT liver for further characterization.      DX-ABDOMEN FOR TUBE PLACEMENT   Final Result      1.  Enteric tube overlies the proximal stomach.      CT-HEAD W/O   Final Result      Negative noncontrast CT scan of the head / brain.         DX-CHEST-PORTABLE (1 VIEW)   Final Result      1.  Satisfactory lines and tubes as detailed.   2.  Diffuse bilateral pulmonary opacities are nonspecific, may be related to extensive pulmonary edema or infection.   3.  There may be layering small pleural effusions. No  "pneumothorax.             Assessment/Plan  * Possible infectious meningitis- (present on admission)  Assessment & Plan  Initial concern of potential CSF infection     ID following  Continue doxycycline complete 2 weeks course  MRI brain normal  Discussed with neurology Dr. Castañeda on 10/13/2022 recommendation is to obtain EEG and monitor patient as long as clinically improving If worsening then  consider repeat LP and check autoimmune encephalitis panel cell count and proteins    GERD (gastroesophageal reflux disease)  Assessment & Plan  Prilosec and Tums as needed    Elevated liver enzymes  Assessment & Plan  Abdominal exam is benign  LFTs trended down  Ammonia normal    Altered mental status  Assessment & Plan   acute encephalopathy    Empirically treated with antibiotics given recent tick infection  Discussed with ID LP done at outside facility not consistent with meningitis   continue empiric treatment with doxycycline and repeat MRI of brain given persistent symptoms    Tick bite of left lower leg  Assessment & Plan  Lower extremity rash resolved    Serologies were negative  Lyme PCR negative    Complete 2 weeks course of doxycycline    Paroxysmal A-fib (HCC)  Assessment & Plan  CHADSVasc score:1  Holding anticoagulation for now.  On low-dose aspirin reviewed with patient's wife  10/7 Echo:  CONCLUSIONS  No prior study is available for comparison.   The left ventricular ejection fraction is visually estimated to be 55%.  Unable to estimate right ventricular systolic pressure due to an   inadequate tricuspid regurgitant jet.\"          Acute respiratory failure with hypoxia (HCC)  Assessment & Plan  Extubated 10/7  Weaned off oxygen       VTE prophylaxis: Xarelto 10 mg daily as prophylaxis    I have performed a physical exam and reviewed and updated ROS and Plan today (10/13/2022). In review of yesterday's note (10/12/2022), there are no changes except as documented above.        "

## 2022-10-13 NOTE — PROGRESS NOTES
Monitor Summary: SR/ST , CO 0.16, QRS 0.06, QT 0.32, with couplets and rare PVCs per strip from monitor room.

## 2022-10-13 NOTE — PROGRESS NOTES
Monitor Summary: SR 81-93, GA 0.17, QRS 0.07, QT 0.33, with PVCs per strip from monitor room.

## 2022-10-13 NOTE — DIETARY
Nutrition Services Brief Update:    Day 9 of admit.  Josh Trivedi is a 57 y.o. male with admitting DX of Seizure.    Pt is on regular diet with no straws. PO intake is variable with half meals 0-50% and other half of meals %. Pt seen at bedside with multiple family members present. Lunch tray untouched, few bites eaten were by family members. Pt reports no appetite and unable to say what sounds good to him to eat. Family discussing sandwiches and possibly getting [employer] for pt before I walked in. Discussed if family willing to bring in food this is welcome if it will increase PO intake. Noted pt confused at times and A/O to self. Pt wanting to sleep, discussed adding variety of smoothies/protein shakes to promote PO options. Family notes liquids have been going better than solid foods. Pt wanting to sleep, discussed willl have Nutrition Rep stop by for preferences with menu so that pt can get foods he likes, encouraged family to help with food preferences if pt asleep.    Problem: Nutritional:  Goal: Achieve adequate nutritional intake  Description: Patient will consume 50% of meals  Outcome: Progressing    Recommendations/Plan:  Regular diet.   Will order chobani smoothies/Boost glucose with meals to promote PO intake.   Asked Nutrition Rep to see pt for menu preferences and provide a menu.    RD following.

## 2022-10-13 NOTE — THERAPY
Occupational Therapy   Initial Evaluation     Patient Name: Josh Trivedi  Age:  57 y.o., Sex:  male  Medical Record #: [id number]  Today's Date: 10/13/2022     Precautions  Precautions: (P) Fall Risk, Swallow Precautions ( See Comments)    Assessment    Patient is 57 y.o. male admitted for worsening SOB, fever, and HA. Pt with recent tick bite, MRI of brain with no acute abnormality. encephalopathy, concern for encephalitis associated with potential tickborne illness. Pt normally independent with functional mobility and ADLs living in a 2 story home with spouse and working full time as a CRNA. Pt required CGA for functional mobility and ADLs with FWW, needed minimal cues for re-direction due to cognitive status. Will continue to see for skilled therapy while admitted as well as recommend home health therapy.       Plan    Recommend Occupational Therapy 3 times per week until therapy goals are met for the following treatments:  Adaptive Equipment, Neuro Re-Education / Balance, Self Care/Activities of Daily Living, Therapeutic Activities, and Therapeutic Exercises.    DC Equipment Recommendations: (P) Unable to determine at this time  Discharge Recommendations: (P) Recommend home health therapy    Objective       10/13/22 0958   Prior Living Situation   Prior Services None   Housing / Facility 2 Story House   Steps Into Home 1   Steps In Home 12   Bathroom Set up Walk In Shower   Equipment Owned None   Lives with - Patient's Self Care Capacity Spouse;Adult Children   Prior Level of ADL Function   Self Feeding Independent   Grooming / Hygiene Independent   Bathing Independent   Dressing Independent   Toileting Independent   Prior Level of IADL Function   Medication Management Independent   Laundry Independent   Kitchen Mobility Independent   Finances Independent   Home Management Independent   Shopping Independent   Prior Level Of Mobility Independent Without Device in Community   Driving / Transportation Driving  Independent   Occupation (Pre-Hospital Vocational) Employed Full Time  (Works as a CRNA)   Precautions   Precautions Fall Risk;Swallow Precautions ( See Comments)   Pain 0 - 10 Group   Therapist Pain Assessment Post Activity Pain Same as Prior to Activity;Nurse Notified   Cognition    Cognition / Consciousness X   Speech/ Communication Delayed Responses   Orientation Level Not Oriented to Month;Not Oriented to Year;Not Oriented to Place;Not Oriented to Day   Level of Consciousness Alert   Attention Impaired   Initiation Impaired   Active ROM Upper Body   Active ROM Upper Body  WDL   Dominant Hand Right   Strength Upper Body   Upper Body Strength  WDL   Sensation Upper Body   Upper Extremity Sensation  WDL   Upper Body Muscle Tone   Upper Body Muscle Tone  WDL   Neurological Concerns   Neurological Concerns No   Coordination Upper Body   Coordination WDL   Balance Assessment   Sitting Balance (Static) Fair +   Sitting Balance (Dynamic) Fair   Standing Balance (Static) Fair -   Standing Balance (Dynamic) Fair -   Weight Shift Sitting Fair   Weight Shift Standing Fair   Comments w/ FWW   Bed Mobility    Supine to Sit Standby Assist   Scooting Standby Assist   Rolling Standby Assist   ADL Assessment   Grooming Standby Assist   Upper Body Dressing Minimal Assist   Lower Body Dressing Minimal Assist   Toileting   (NT-refused need)   How much help from another person does the patient currently need...   Putting on and taking off regular lower body clothing? 3   Bathing (including washing, rinsing, and drying)? 3   Toileting, which includes using a toilet, bedpan, or urinal? 3   Putting on and taking off regular upper body clothing? 3   Taking care of personal grooming such as brushing teeth? 3   Eating meals? 3   6 Clicks Daily Activity Score 18   Functional Mobility   Sit to Stand Standby Assist   Bed, Chair, Wheelchair Transfer Contact Guard Assist   Transfer Method Stand Step   Mobility bed mobility, hallway mobility,  up to chair   Comments w/ FWW   Activity Tolerance   Sitting in Chair left seated in chair   Sitting Edge of Bed 6 min   Standing 8 min   Short Term Goals   Short Term Goal # 1 Pt will complete ADL transfers with supervision   Short Term Goal # 2 Pt will complete LB dressing with supervision   Short Term Goal # 3 Pt will complete toileting with supervision   Education Group   Education Provided Role of Occupational Therapist   Role of Occupational Therapist Patient Response Patient;Acceptance;Explanation   Problem List   Problem List Decreased Active Daily Living Skills;Decreased Homemaking Skills;Decreased Functional Mobility;Decreased Activity Tolerance;Impaired Postural Control / Balance;Impaired Cognitive Function;Safety Awareness Deficits / Cognition   Anticipated Discharge Equipment and Recommendations   DC Equipment Recommendations Unable to determine at this time   Discharge Recommendations Recommend post-acute placement for additional occupational therapy services prior to discharge home   Interdisciplinary Plan of Care Collaboration   IDT Collaboration with  Nursing;Physical Therapist   Patient Position at End of Therapy Seated;Chair Alarm On;Call Light within Reach;Tray Table within Reach;Phone within Reach   Collaboration Comments RN updated

## 2022-10-14 ENCOUNTER — HOSPITAL ENCOUNTER (INPATIENT)
Facility: REHABILITATION | Age: 57
End: 2022-10-14
Attending: PHYSICAL MEDICINE & REHABILITATION | Admitting: PHYSICAL MEDICINE & REHABILITATION
Payer: COMMERCIAL

## 2022-10-14 PROBLEM — J96.01 ACUTE RESPIRATORY FAILURE WITH HYPOXIA (HCC): Status: RESOLVED | Noted: 2022-10-04 | Resolved: 2022-10-14

## 2022-10-14 LAB — GLUCOSE BLD STRIP.AUTO-MCNC: 172 MG/DL (ref 65–99)

## 2022-10-14 PROCEDURE — 86052 AQUAPORIN-4 ANTB CBA EACH: CPT

## 2022-10-14 PROCEDURE — 97129 THER IVNTJ 1ST 15 MIN: CPT

## 2022-10-14 PROCEDURE — 700102 HCHG RX REV CODE 250 W/ 637 OVERRIDE(OP): Performed by: STUDENT IN AN ORGANIZED HEALTH CARE EDUCATION/TRAINING PROGRAM

## 2022-10-14 PROCEDURE — 700102 HCHG RX REV CODE 250 W/ 637 OVERRIDE(OP): Performed by: HOSPITALIST

## 2022-10-14 PROCEDURE — 94660 CPAP INITIATION&MGMT: CPT

## 2022-10-14 PROCEDURE — 94760 N-INVAS EAR/PLS OXIMETRY 1: CPT

## 2022-10-14 PROCEDURE — 36415 COLL VENOUS BLD VENIPUNCTURE: CPT

## 2022-10-14 PROCEDURE — A9270 NON-COVERED ITEM OR SERVICE: HCPCS | Performed by: STUDENT IN AN ORGANIZED HEALTH CARE EDUCATION/TRAINING PROGRAM

## 2022-10-14 PROCEDURE — 700111 HCHG RX REV CODE 636 W/ 250 OVERRIDE (IP): Performed by: HOSPITALIST

## 2022-10-14 PROCEDURE — 83519 RIA NONANTIBODY: CPT

## 2022-10-14 PROCEDURE — 700102 HCHG RX REV CODE 250 W/ 637 OVERRIDE(OP): Performed by: INTERNAL MEDICINE

## 2022-10-14 PROCEDURE — 82962 GLUCOSE BLOOD TEST: CPT

## 2022-10-14 PROCEDURE — 97130 THER IVNTJ EA ADDL 15 MIN: CPT

## 2022-10-14 PROCEDURE — 700111 HCHG RX REV CODE 636 W/ 250 OVERRIDE (IP): Performed by: STUDENT IN AN ORGANIZED HEALTH CARE EDUCATION/TRAINING PROGRAM

## 2022-10-14 PROCEDURE — A9270 NON-COVERED ITEM OR SERVICE: HCPCS | Performed by: HOSPITALIST

## 2022-10-14 PROCEDURE — 770020 HCHG ROOM/CARE - TELE (206)

## 2022-10-14 PROCEDURE — 99232 SBSQ HOSP IP/OBS MODERATE 35: CPT | Performed by: HOSPITALIST

## 2022-10-14 PROCEDURE — 86341 ISLET CELL ANTIBODY: CPT

## 2022-10-14 PROCEDURE — A9270 NON-COVERED ITEM OR SERVICE: HCPCS | Performed by: INTERNAL MEDICINE

## 2022-10-14 PROCEDURE — 86362 MOG-IGG1 ANTB CBA EACH: CPT

## 2022-10-14 PROCEDURE — 86255 FLUORESCENT ANTIBODY SCREEN: CPT | Mod: 91

## 2022-10-14 RX ORDER — HALOPERIDOL 5 MG/ML
1 INJECTION INTRAMUSCULAR EVERY 4 HOURS PRN
Status: DISCONTINUED | OUTPATIENT
Start: 2022-10-14 | End: 2022-10-19 | Stop reason: HOSPADM

## 2022-10-14 RX ADMIN — FUROSEMIDE 40 MG: 10 INJECTION, SOLUTION INTRAMUSCULAR; INTRAVENOUS at 06:26

## 2022-10-14 RX ADMIN — HALOPERIDOL LACTATE 1 MG: 5 INJECTION, SOLUTION INTRAMUSCULAR at 12:30

## 2022-10-14 RX ADMIN — METOPROLOL SUCCINATE 25 MG: 25 TABLET, FILM COATED, EXTENDED RELEASE ORAL at 06:25

## 2022-10-14 RX ADMIN — SENNOSIDES AND DOCUSATE SODIUM 2 TABLET: 50; 8.6 TABLET ORAL at 17:02

## 2022-10-14 RX ADMIN — OMEPRAZOLE 20 MG: 20 CAPSULE, DELAYED RELEASE ORAL at 06:26

## 2022-10-14 RX ADMIN — DOXYCYCLINE 100 MG: 100 TABLET, FILM COATED ORAL at 06:26

## 2022-10-14 RX ADMIN — RIVAROXABAN 10 MG: 10 TABLET, FILM COATED ORAL at 17:02

## 2022-10-14 RX ADMIN — DOXYCYCLINE 100 MG: 100 TABLET, FILM COATED ORAL at 17:02

## 2022-10-14 RX ADMIN — SENNOSIDES AND DOCUSATE SODIUM 2 TABLET: 50; 8.6 TABLET ORAL at 06:25

## 2022-10-14 RX ADMIN — ASPIRIN 81 MG: 81 TABLET, COATED ORAL at 06:25

## 2022-10-14 ASSESSMENT — PAIN DESCRIPTION - PAIN TYPE
TYPE: ACUTE PAIN
TYPE: ACUTE PAIN

## 2022-10-14 NOTE — FACE TO FACE
Face to Face Supporting Documentation - Home Health    The encounter with this patient was in whole or in part the primary reason for home health admission.    Date of encounter:   Patient:                    MRN:                       YOB: 20[birth year]  Josh Trivedi  [id number]  1965     Home health to see patient for:  Skilled Nursing care for assessment, interventions & education, Physical Therapy evaluation and treatment, Occupational therapy evaluation and treatment, and Speech Language Pathology evaluation and treatment    Skilled need for:  New Onset Medical Diagnosis acute encephalopathy    Skilled nursing interventions to include:  Comment: med management and monitoring    Homebound status evidenced by:  Needs the assistance of another person in order to leave the home. Leaving home requires a considerable and taxing effort. There is a normal inability to leave the home.    Community Physician to provide follow up care: Pcp Pt States None     Optional Interventions? No      I certify the face to face encounter for this home health care referral meets the CMS requirements and the encounter/clinical assessment with the patient was, in whole, or in part, for the medical condition(s) listed above, which is the primary reason for home health care. Based on my clinical findings: the service(s) are medically necessary, support the need for home health care, and the homebound criteria are met.  I certify that this patient has had a face to face encounter by myself.  Familia Hickman M.D. - NPI: [id number]

## 2022-10-14 NOTE — PROGRESS NOTES
Monitor summary: SR, HR 83-97, UT 0.16, QRS 0.07, QT 0.34 with (R) PVCs per strip from monitor room

## 2022-10-14 NOTE — PROGRESS NOTES
Hospital Medicine Daily Progress Note    Date of Service  10/14/2022    Chief Complaint  Josh Trivedi is a 57 y.o. male admitted 10/4/2022 with No chief complaint on file.      Hospital Course  No notes on file  Josh Trivedi is a 57 y.o. male with a history of obesity, sleep apnea, paroxysmal atrial fibrillation on Xarelto who presented 10/4/2022 with transfer from [facility] on 10/2 for worsening shortness of breath fevers and headache.  Upon admission he had been intubated and sedated due to witnessed seizure on 10/4 with altered mental status and hypoxia.     Patient is from [place] and approximately 2 weeks prior to admission had been bitten by a tick on the left lower extremity.  He had a rash of his bilateral legs associated with fever and headaches.  He went to an urgent care and was diagnosed with prostatitis and given antibiotics but he did not improve.  He went to the emergency room on 9/30 for fevers chills shortness of breath and headache.  Patient was discharged but presented again on 10/2 which led to current hospitalization.  At outside hospital he was worked up for potential tickborne illness including Lyme disease, [place] spotted fever, and meningitis.  Lumbar puncture obtained and CSF cultures, and protein (elevated 57), cell count and normal CSF glucose.  He has had elevated liver enzymes.  Patient was negative for COVID, HIV.  Infectious diseases been consulting and has concern of [place] spotted fever.      Interval Problem Update    Patient is oriented 2-3  Denies headache  Afebrile  Still confused to situation at times difficulty finding words          I have discussed this patient's plan of care and discharge plan at IDT rounds today with Case Management, Nursing, Nursing leadership, and other members of the IDT team.    Consultants/Specialty  Intensivist admitted  Hospitalist  ID      Code Status  Full Code    Disposition  Patient is not medically cleared for  discharge.   Anticipate discharge to  TBD .  I have placed the appropriate orders for post-discharge needs.    Review of Systems  Review of Systems   Unable to perform ROS: Medical condition      Physical Exam  Temp:  [36.6 °C (97.9 °F)-36.8 °C (98.2 °F)] 36.6 °C (97.9 °F)  Pulse:  [77-95] 86  Resp:  [17-18] 18  BP: (116-137)/(73-88) 126/77  SpO2:  [91 %-93 %] 91 %    Physical Exam  Vitals and nursing note reviewed.   Constitutional:       Appearance: He is well-developed. He is not diaphoretic.   HENT:      Head: Normocephalic and atraumatic.      Mouth/Throat:      Pharynx: No oropharyngeal exudate.   Eyes:      General: No scleral icterus.        Right eye: No discharge.         Left eye: No discharge.      Conjunctiva/sclera: Conjunctivae normal.      Pupils: Pupils are equal, round, and reactive to light.   Neck:      Vascular: No JVD.      Trachea: No tracheal deviation.   Cardiovascular:      Rate and Rhythm: Normal rate and regular rhythm.      Heart sounds: No murmur heard.    No friction rub. No gallop.   Pulmonary:      Effort: Pulmonary effort is normal. No respiratory distress.      Breath sounds: Normal breath sounds. No stridor. No wheezing.   Chest:      Chest wall: No tenderness.   Abdominal:      General: Bowel sounds are normal. There is no distension.      Palpations: Abdomen is soft.      Tenderness: There is no abdominal tenderness. There is no rebound.   Musculoskeletal:         General: No tenderness.      Cervical back: Neck supple.   Skin:     General: Skin is warm and dry.      Nails: There is no clubbing.   Neurological:      Mental Status: He is alert.      Cranial Nerves: No cranial nerve deficit.      Motor: Weakness (Generalized 4+/5) present. No abnormal muscle tone.      Comments: Oriented x2   Psychiatric:         Mood and Affect: Mood is anxious.       Fluids  No intake or output data in the 24 hours ending 10/14/22 1447      Laboratory  Recent Labs     10/12/22  4591  10/13/22  0221   WBC 9.2 8.6   RBC 5.12 4.89   HEMOGLOBIN 13.2* 12.8*   HEMATOCRIT 42.3 39.9*   MCV 82.6 81.6   MCH 25.8* 26.2*   MCHC 31.2* 32.1*   RDW 42.6 42.8   PLATELETCT 265 256   MPV 8.9* 9.0       Recent Labs     10/12/22  0507 10/13/22  0221   SODIUM 142 141   POTASSIUM 3.5* 3.8   CHLORIDE 103 105   CO2 28 27   GLUCOSE 124* 119*   BUN 23* 22   CREATININE 0.76 0.77   CALCIUM 8.3* 8.2*                         Imaging  MR-BRAIN-WITH & W/O   Final Result      1.  No acute abnormality.   2.  Mild cerebral volume loss.   3.  There is no hippocampal sclerosis, neuronal migrational abnormality or cortical dysplasia.      EC-ECHOCARDIOGRAM COMPLETE W/O CONT   Final Result      CT-CTA CHEST PULMONARY ARTERY W/ RECONS   Final Result         1.  No pulmonary embolus appreciated.   2.  Moderate bilateral pleural effusions. Associated linear consolidations in the lung bases suggests atelectasis, component of infiltrate not excluded.   3.  Low-density left hepatic lobe lesion, could represent small cyst or hemangioma, otherwise indeterminate. Could be followed up with three-phase CT liver for further characterization.      DX-ABDOMEN FOR TUBE PLACEMENT   Final Result      1.  Enteric tube overlies the proximal stomach.      CT-HEAD W/O   Final Result      Negative noncontrast CT scan of the head / brain.         DX-CHEST-PORTABLE (1 VIEW)   Final Result      1.  Satisfactory lines and tubes as detailed.   2.  Diffuse bilateral pulmonary opacities are nonspecific, may be related to extensive pulmonary edema or infection.   3.  There may be layering small pleural effusions. No pneumothorax.             Assessment/Plan  * Possible infectious meningitis- (present on admission)  Assessment & Plan  Initial concern of potential CSF infection     Evaluated by ID with recommendations to complete 2 weeks course of doxycycline given recent tick bite with rash    MRI brain normal  Discussed with neurology Dr. Castañeda on 10/13/2022  "recommendation is to obtain EEG and monitor patient as long as clinically improving If worsening then  consider repeat LP and check autoimmune encephalitis panel cell count and proteins  Discussed again with Dr. Garcia on 10/14/2022 and reviewed EEG findings and test results with him.  His impression at this time as this is likely related to tick bite with CNS infection.  Given overall slow clinical improvement he recommends completing treatment with doxycycline and outpatient follow-up with neurology.  He suggested sending serum paraneoplastic and autoimmune serology and if patient does not return to baseline consideration of repeat LP with outpatient neurology  Reviewed plan of care with patient's wife discussed home health referral which will be initiated    GERD (gastroesophageal reflux disease)  Assessment & Plan  Prilosec and Tums as needed    Elevated liver enzymes  Assessment & Plan  Abdominal exam is benign  LFTs trended down  Ammonia normal    Altered mental status  Assessment & Plan   acute encephalopathy    Empirically treated with antibiotics given recent tick infection  Discussed with ID LP done at outside facility not consistent with meningitis   continue empiric treatment with doxycycline and repeat MRI of brain given persistent symptoms    Tick bite of left lower leg  Assessment & Plan  Lower extremity rash resolved    Serologies were negative  Lyme PCR negative    Complete 2 weeks course of doxycycline    Paroxysmal A-fib (HCC)  Assessment & Plan  CHADSVasc score:1  Holding anticoagulation for now.  On low-dose aspirin reviewed with patient's wife  10/7 Echo:  CONCLUSIONS  No prior study is available for comparison.   The left ventricular ejection fraction is visually estimated to be 55%.  Unable to estimate right ventricular systolic pressure due to an   inadequate tricuspid regurgitant jet.\"             VTE prophylaxis: Xarelto 10 mg daily as prophylaxis    I have performed a physical exam and " reviewed and updated ROS and Plan today (10/14/2022). In review of yesterday's note (10/13/2022), there are no changes except as documented above.

## 2022-10-14 NOTE — CARE PLAN
Problem: Knowledge Deficit - Standard  Goal: Patient and family/care givers will demonstrate understanding of plan of care, disease process/condition, diagnostic tests and medications  Outcome: Progressing     Problem: Pain - Standard  Goal: Alleviation of pain or a reduction in pain to the patient’s comfort goal  Outcome: Progressing     Problem: Skin Integrity  Goal: Skin integrity is maintained or improved  Outcome: Progressing     Problem: Fall Risk  Goal: Patient will remain free from falls  Outcome: Progressing     The patient is Stable - Low risk of patient condition declining or worsening    Shift Goals  Clinical Goals: safety, improved cognitive function, HD stability, comfort  Patient Goals: sleep  Family Goals: sal    Progress made toward(s) clinical / shift goals:  pt slept well throughout the night. Continues to be impulsive with getting out of bed without staff assist. Bed alarm on at all times. Pt is able to turn himself. Pt denied pain throughout the night.     Patient is not progressing towards the following goals:

## 2022-10-14 NOTE — CARE PLAN
The patient is Stable - Low risk of patient condition declining or worsening    Shift Goals  Clinical Goals: safety  Patient Goals: comfort  Family Goals: sal    Progress made toward(s) clinical / shift goals:    Problem: Skin Integrity  Goal: Skin integrity is maintained or improved  Outcome: Progressing     Problem: Fall Risk  Goal: Patient will remain free from falls  Outcome: Progressing       Patient is not progressing towards the following goals:

## 2022-10-14 NOTE — DISCHARGE PLANNING
1155  Spoke w/wife, Jessica, in regard to choices for SNF's  in [place].  Told her the only way we can send choices; if they can pay for transportation to [place].      They are willing to do that.      Presented choices but she told me he may not need SNF.  OT/PT is now recommending HH.  I told her let me look at the recommendations because they were not there earlier.      She also ask if it would be possible to transport him home in the car.      Told her I will have to discuss with the team and Dr. Fide Hickman to see if that will be ok.   I told them that right now he is not med. Clear to go but will discuss at meeting at 2:45 pm and will have to let her know.      No other needs at this time and will cont. To follow.        1640  Pt has been moved to [id number].  Jessica informed me that  works here in [place] and they have a home in [place] and wants to know if the insurance would cover HH in [place].      I told her to call his insurance and ask for elibil. Dept to see.  Also, she could ask for a  also to find out.      She will call tomorrow tami.  Also, asked if I knew he could ride in car home.  I told her that we had meeting but the doctor was unavail. At the time.

## 2022-10-14 NOTE — PROGRESS NOTES
Review chart including vitals and labs.  Patient stable, no further leukocytosis and liver enzymes continue to normalize.  There has been concern for tickborne illness.    --- Okay to continue doxycycline to complete a 14-day course  --- MRI head unremarkable  --- OSH lumbar puncture not supportive of either bacterial or viral infection (WBC 3) mild protein elevation which could be due to the seizure unsure if this was obtained post episode, possibility of West Nile although seems unlikely  --- Syphilis screen negative  --- No clear etiology of the reported witnessed seizure  --- While the initial event may be associated with the tickborne disease this would be expected to have continued improvement on doxycycline and all infectious work-up including extensive work-up for tickborne disease is negative.  Suspect a noninfectious etiology for his ongoing symptoms and recommended continued neurology work-up    ID will sign off.     Genoveva Mccabe MD

## 2022-10-14 NOTE — DISCHARGE PLANNING
Care Transition Team Discharge Planning    Anticipated Discharge Information  Discharge Disposition: D/T to home under [facility]HA care in anticipation of covered skilled care (06)      Discharge Plan:  CM met with patient family to discuss discharge planning, family electing to go home with [facility], they will be staying at their condo in [facility] . Provided wife with chice list , she has no preference for providers, CM veried pt address : [address] [place] [address], [place] [phone], PCP : UMESH Whaley sending [facility] referral to :     [facility] ( [phone]/ F [phone]), approval pending       5:12 PM   Wife requesting CM try [facility] in [place], CM previously informed wife, most [facility] agencies will not take orders from out of state provider , CM called 4 agencies in the area of [place], [place] and all stated patient will need a [place] provider to write his HHC orders , wife provided [facility] paperwork, CM forwarded the paperwork to Brian Pina via email      Kendra KASPERN, RN CCM   Care Coordinator

## 2022-10-14 NOTE — PROGRESS NOTES
Called scheduling to make an appointment with neurologist for pt. Zuri said she will send pt's referral to referral dept for authorization. Cont to monitor

## 2022-10-14 NOTE — THERAPY
"Speech Language Pathology  Daily Treatment     Patient Name: Josh Trivedi  Age:  57 y.o., Sex:  male  Medical Record #: [id number]  Today's Date: 10/14/2022     Precautions  Precautions: (P) Fall Risk, Swallow Precautions ( See Comments)    HPI: Pt is a 57 y.o. man admitted on 10/4/22 as a transfer from [facility] for worsening SOB, fevers, and headache; hypoxic, witnessed seizure, intubated 10/4-10/7 (76 hours). Labs currently negative for infectious disease per MD. MRI ordered. CSE 10/11: RG7/TN0 w/ precautions    Subjective  Pt lethargic but agreeable to SLP tx this date. Session ended when pt stated \"I need to sleep.\" Dysphagia tx not completed this date d/t pt declining PO trials w/ SLP from breakfast tray. Pt's family reported tolerance of current diet (no cough/dejan) but poor PO intake.     Assessment  Pt oriented to self. Word finding noted for \"hospital\"; however, given cues, pt oriented to general place and situation. Pt not oriented to city/state, said he is in [place], [place]. Not oriented to date. SLP updated and oriented pt to white board for date, hospital name, and city/state. Pt answered biographical questions w/ 50% accuracy this date; performance did not improve given max cues from SLP and family.     Pt attention deficit continues to impact overall cognitive performance. Pt needed consistent repetition and multisensory stimulation to attend to therapy tasks. Pt claimed he forgot the question posed x4 throughout tx session. Pt required max cues from SLP to describe safety precautions as follows: mobility precautions, swallow precautions, use of call light. Pt able to recall that he can to use his call light when he needs help after approx. 5 min delay. Pt continues to demonstrate impulsivity and impaired reasoning by pushing call light during discussion of when to use light. Pt required max cues to answer functional problem solving questions, suspect involvement of attention and memory deficits. Pt " "better ability to attend given frequent verbal and tactile redirections using his name.     Clinical Impressions  Pt presents w/ severe cognitive deficits, evidenced by impairments in orientation, memory, judgement, and executive functioning. Given severity of deficits and poor safety awareness, pt will will need direct A w/ IADLs upon discharge to home health per POC in EMR.     Note: It is not within the scope of practice of Speech-Language Pathologists to determine patient capacity. Please defer to the physician or psych to complete this assessment.    Recommendations  Supervision Needs Upon Discharge: The pt will benefit from direct assistance for the following IADLs: Medication management, Financial management, Appointment management, Household chores, Cooking.  2.   Effective Communication Strategies: repetition/reinforcement of directive, 1-step directions, reorientation      Plan    Continue current treatment plan.    Discharge Recommendations: (P) Recommend post-acute placement for additional speech therapy services prior to discharge home    Objective   10/14/22 1015   Precautions   Precautions Fall Risk;Swallow Precautions ( See Comments)   Vitals   O2 Delivery Device None - Room Air  (CPAP at night)   Pain 0 - 10 Group   Therapist Pain Assessment Post Activity Pain Same as Prior to Activity  (No c/o pain)   Cognitive-Linguistic   Level of Consciousness Alert   Orientation Level Not Oriented to Month;Not Oriented to Day;Not Oriented to Place;Not Oriented to Year   Sustained Attention Moderate (3)   Divided Attention Moderate (3)   Short Term Memory Profound (1)   Safety Awareness Moderate (3)   Patient / Family Goals   Patient / Family Goal #1 \"Taking on issues with cognition\"   Goal #1 Outcome Progressing slower than expected   Short Term Goals   Short Term Goal # 2 Pt will correctly answer open set questions related to orientation and biographical information given min cues from SLP w/ 90% accuracy " across three sessions.   Goal Outcome # 2  Progressing slower than expected   Short Term Goal # 3 Pt will recall functional information concerning safety precautions given five minute delay and min cues from SLP w/ 80% accuracy.   Goal Outcome  # 3 Progressing slower than expected   Short Term Goal # 5 Pt will correctly answer functional problem-solving tasks given min cues from SLP w/ 80% accuracy in order to improve safety and independence w/ IADLs.   Goal Outcome  # 5 Progressing slower than expected   Education Group   Education Provided Role of Speech Therapy;Traumatic Brain Injury / Cognitive-Linguistic   TBI / Cog-Ling Patient Response Patient;Significant Other;Family;Acceptance;Explanation;Action Demonstration;Verbal Demonstration;Reinforcement Needed   Role of SLP Patient Response Patient;Family;Significant Other;Acceptance;Demonstration;Verbal Demonstration;Reinforcement Needed   Additional Comments Pt's wife w/ excellent demonstrated understanding of results and recommendations. Pt's white board updated to promote orientation.   Anticipated Discharge Needs   Discharge Recommendations Recommend post-acute placement for additional speech therapy services prior to discharge home   Therapy Recommendations Upon DC Dysphagia Training;Expression Training;Comprehension Training;Cognitive-Linguistic Training;Community Re-Integration;Patient / Family / Caregiver Education   Interdisciplinary Plan of Care Collaboration   Collaboration Comments RN aware of session. Family in agreement w/ POC.

## 2022-10-15 LAB
BURR CELLS/RBC NFR CSF MANUAL: <1 %
CLARITY CSF: CLEAR
CMV DNA # SERPL NAA+PROBE: ABNORMAL CPY/ML
CMV DNA SERPL NAA+PROBE-ACNC: ABNORMAL IU/ML
CMV DNA SERPL NAA+PROBE-LOG IU: 4.1 LOG IU/ML
CMV DNA SERPL NAA+PROBE-LOG#: 4.3 LOG CPY/ML
CMV GENE MUT DET ISLT: DETECTED
CMV PCR SOURCE Q4398: ABNORMAL
COLOR CSF: NORMAL
COLOR SPUN CSF: COLORLESS
CRYPTOC AG CSF QL LA: NEGATIVE
CYTOLOGY REG CYTOL: NORMAL
GRAM STN SPEC: NORMAL
LYMPHOCYTES NFR CSF: 64 %
MONONUC CELLS NFR CSF: 18 %
NEUTROPHILS NFR CSF: 18 %
PROT CSF-MCNC: 84 MG/DL (ref 15–45)
RBC # CSF: 488 CELLS/UL
SIGNIFICANT IND 70042: NORMAL
SITE SITE: NORMAL
SOURCE SOURCE: NORMAL
SPECIMEN VOL CSF: 17 ML
TSH SERPL DL<=0.005 MIU/L-ACNC: 2.3 UIU/ML (ref 0.38–5.33)
TUBE # CSF: 3
TUBE # CSF: 4
VIT B12 SERPL-MCNC: 733 PG/ML (ref 211–911)
WBC # CSF: 1 CELLS/UL (ref 0–10)

## 2022-10-15 PROCEDURE — 87798 DETECT AGENT NOS DNA AMP: CPT

## 2022-10-15 PROCEDURE — 87483 CNS DNA AMP PROBE TYPE 12-25: CPT

## 2022-10-15 PROCEDURE — 99223 1ST HOSP IP/OBS HIGH 75: CPT | Performed by: PHYSICAL MEDICINE & REHABILITATION

## 2022-10-15 PROCEDURE — 99232 SBSQ HOSP IP/OBS MODERATE 35: CPT | Performed by: HOSPITALIST

## 2022-10-15 PROCEDURE — 87070 CULTURE OTHR SPECIMN AEROBIC: CPT

## 2022-10-15 PROCEDURE — 700102 HCHG RX REV CODE 250 W/ 637 OVERRIDE(OP): Performed by: HOSPITALIST

## 2022-10-15 PROCEDURE — 700111 HCHG RX REV CODE 636 W/ 250 OVERRIDE (IP): Performed by: STUDENT IN AN ORGANIZED HEALTH CARE EDUCATION/TRAINING PROGRAM

## 2022-10-15 PROCEDURE — 700105 HCHG RX REV CODE 258: Performed by: HOSPITALIST

## 2022-10-15 PROCEDURE — 700111 HCHG RX REV CODE 636 W/ 250 OVERRIDE (IP): Performed by: HOSPITALIST

## 2022-10-15 PROCEDURE — 82607 VITAMIN B-12: CPT

## 2022-10-15 PROCEDURE — 770020 HCHG ROOM/CARE - TELE (206)

## 2022-10-15 PROCEDURE — 009U3ZX DRAINAGE OF SPINAL CANAL, PERCUTANEOUS APPROACH, DIAGNOSTIC: ICD-10-PCS | Performed by: STUDENT IN AN ORGANIZED HEALTH CARE EDUCATION/TRAINING PROGRAM

## 2022-10-15 PROCEDURE — 62270 DX LMBR SPI PNXR: CPT | Performed by: STUDENT IN AN ORGANIZED HEALTH CARE EDUCATION/TRAINING PROGRAM

## 2022-10-15 PROCEDURE — 36415 COLL VENOUS BLD VENIPUNCTURE: CPT

## 2022-10-15 PROCEDURE — 82784 ASSAY IGA/IGD/IGG/IGM EACH: CPT | Mod: 91

## 2022-10-15 PROCEDURE — 83825 ASSAY OF MERCURY: CPT

## 2022-10-15 PROCEDURE — 82300 ASSAY OF CADMIUM: CPT

## 2022-10-15 PROCEDURE — 86255 FLUORESCENT ANTIBODY SCREEN: CPT | Mod: 91

## 2022-10-15 PROCEDURE — 86341 ISLET CELL ANTIBODY: CPT

## 2022-10-15 PROCEDURE — 82040 ASSAY OF SERUM ALBUMIN: CPT

## 2022-10-15 PROCEDURE — A9270 NON-COVERED ITEM OR SERVICE: HCPCS | Performed by: STUDENT IN AN ORGANIZED HEALTH CARE EDUCATION/TRAINING PROGRAM

## 2022-10-15 PROCEDURE — 82175 ASSAY OF ARSENIC: CPT

## 2022-10-15 PROCEDURE — 83916 OLIGOCLONAL BANDS: CPT

## 2022-10-15 PROCEDURE — 700102 HCHG RX REV CODE 250 W/ 637 OVERRIDE(OP): Performed by: STUDENT IN AN ORGANIZED HEALTH CARE EDUCATION/TRAINING PROGRAM

## 2022-10-15 PROCEDURE — 87205 SMEAR GRAM STAIN: CPT

## 2022-10-15 PROCEDURE — 86403 PARTICLE AGGLUT ANTBDY SCRN: CPT

## 2022-10-15 PROCEDURE — 83655 ASSAY OF LEAD: CPT

## 2022-10-15 PROCEDURE — 700102 HCHG RX REV CODE 250 W/ 637 OVERRIDE(OP): Performed by: INTERNAL MEDICINE

## 2022-10-15 PROCEDURE — 84157 ASSAY OF PROTEIN OTHER: CPT

## 2022-10-15 PROCEDURE — A9270 NON-COVERED ITEM OR SERVICE: HCPCS | Performed by: INTERNAL MEDICINE

## 2022-10-15 PROCEDURE — 700101 HCHG RX REV CODE 250: Performed by: HOSPITALIST

## 2022-10-15 PROCEDURE — 86789 WEST NILE VIRUS ANTIBODY: CPT

## 2022-10-15 PROCEDURE — 88108 CYTOPATH CONCENTRATE TECH: CPT

## 2022-10-15 PROCEDURE — 82042 OTHER SOURCE ALBUMIN QUAN EA: CPT

## 2022-10-15 PROCEDURE — A9270 NON-COVERED ITEM OR SERVICE: HCPCS | Performed by: HOSPITALIST

## 2022-10-15 PROCEDURE — 99223 1ST HOSP IP/OBS HIGH 75: CPT | Performed by: PSYCHIATRY & NEUROLOGY

## 2022-10-15 PROCEDURE — 86788 WEST NILE VIRUS AB IGM: CPT

## 2022-10-15 PROCEDURE — 86592 SYPHILIS TEST NON-TREP QUAL: CPT

## 2022-10-15 PROCEDURE — 84443 ASSAY THYROID STIM HORMONE: CPT

## 2022-10-15 PROCEDURE — 89051 BODY FLUID CELL COUNT: CPT

## 2022-10-15 RX ORDER — MIDAZOLAM HYDROCHLORIDE 1 MG/ML
2 INJECTION INTRAMUSCULAR; INTRAVENOUS ONCE
Status: COMPLETED | OUTPATIENT
Start: 2022-10-15 | End: 2022-10-15

## 2022-10-15 RX ORDER — METHYLPREDNISOLONE SODIUM SUCCINATE 40 MG/ML
1000 INJECTION, POWDER, LYOPHILIZED, FOR SOLUTION INTRAMUSCULAR; INTRAVENOUS EVERY 6 HOURS
Status: DISCONTINUED | OUTPATIENT
Start: 2022-10-15 | End: 2022-10-15

## 2022-10-15 RX ORDER — METHYLPREDNISOLONE SODIUM SUCCINATE 40 MG/ML
1000 INJECTION, POWDER, LYOPHILIZED, FOR SOLUTION INTRAMUSCULAR; INTRAVENOUS DAILY
Status: DISCONTINUED | OUTPATIENT
Start: 2022-10-15 | End: 2022-10-15

## 2022-10-15 RX ORDER — MIDAZOLAM HYDROCHLORIDE 2 MG/ML
2 SYRUP ORAL ONCE
Status: DISCONTINUED | OUTPATIENT
Start: 2022-10-15 | End: 2022-10-15

## 2022-10-15 RX ADMIN — MIDAZOLAM HYDROCHLORIDE 2 MG: 1 INJECTION, SOLUTION INTRAMUSCULAR; INTRAVENOUS at 17:06

## 2022-10-15 RX ADMIN — RIVAROXABAN 10 MG: 10 TABLET, FILM COATED ORAL at 18:48

## 2022-10-15 RX ADMIN — SENNOSIDES AND DOCUSATE SODIUM 2 TABLET: 50; 8.6 TABLET ORAL at 18:48

## 2022-10-15 RX ADMIN — DOXYCYCLINE 100 MG: 100 TABLET, FILM COATED ORAL at 18:48

## 2022-10-15 RX ADMIN — LIDOCAINE HYDROCHLORIDE 20 ML: 10 INJECTION, SOLUTION INFILTRATION; PERINEURAL at 17:15

## 2022-10-15 RX ADMIN — DOXYCYCLINE 100 MG: 100 TABLET, FILM COATED ORAL at 04:43

## 2022-10-15 RX ADMIN — ACETAMINOPHEN 650 MG: 325 TABLET, FILM COATED ORAL at 09:09

## 2022-10-15 RX ADMIN — OMEPRAZOLE 20 MG: 20 CAPSULE, DELAYED RELEASE ORAL at 04:43

## 2022-10-15 RX ADMIN — HALOPERIDOL LACTATE 1 MG: 5 INJECTION, SOLUTION INTRAMUSCULAR at 23:59

## 2022-10-15 RX ADMIN — ASPIRIN 81 MG: 81 TABLET, COATED ORAL at 04:42

## 2022-10-15 RX ADMIN — POLYETHYLENE GLYCOL 3350 1 PACKET: 17 POWDER, FOR SOLUTION ORAL at 04:45

## 2022-10-15 RX ADMIN — SODIUM CHLORIDE 1000 MG: 9 INJECTION, SOLUTION INTRAVENOUS at 18:43

## 2022-10-15 RX ADMIN — METOPROLOL SUCCINATE 25 MG: 25 TABLET, FILM COATED, EXTENDED RELEASE ORAL at 04:42

## 2022-10-15 RX ADMIN — FUROSEMIDE 40 MG: 10 INJECTION, SOLUTION INTRAMUSCULAR; INTRAVENOUS at 04:49

## 2022-10-15 RX ADMIN — SENNOSIDES AND DOCUSATE SODIUM 2 TABLET: 50; 8.6 TABLET ORAL at 04:44

## 2022-10-15 ASSESSMENT — PAIN DESCRIPTION - PAIN TYPE
TYPE: ACUTE PAIN
TYPE: ACUTE PAIN

## 2022-10-15 NOTE — PROGRESS NOTES
Hospital Medicine Daily Progress Note    Date of Service  10/15/2022    Chief Complaint  Josh Trivedi is a 57 y.o. male admitted 10/4/2022 with No chief complaint on file.      Hospital Course  No notes on file  Josh Trivedi is a 57 y.o. male with a history of obesity, sleep apnea, paroxysmal atrial fibrillation on Xarelto who presented 10/4/2022 with transfer from [facility] on 10/2 for worsening shortness of breath fevers and headache.  Upon admission he had been intubated and sedated due to witnessed seizure on 10/4 with altered mental status and hypoxia.     Patient is from [place] and approximately 2 weeks prior to admission had been bitten by a tick on the left lower extremity.  He had a rash of his bilateral legs associated with fever and headaches.  He went to an urgent care and was diagnosed with prostatitis and given antibiotics but he did not improve.  He went to the emergency room on 9/30 for fevers chills shortness of breath and headache.  Patient was discharged but presented again on 10/2 which led to current hospitalization.  At outside hospital he was worked up for potential tickborne illness including Lyme disease, [place] spotted fever, and meningitis.  Lumbar puncture obtained and CSF cultures, and protein (elevated 57), cell count and normal CSF glucose.  He has had elevated liver enzymes.  Patient was negative for COVID, HIV.  Infectious diseases been consulting and has concern of [place] spotted fever.      Interval Problem Update    Oriented to self and place  Appears more confused today per wife  Denies headache  Evaluated by physiatry with recommendation for IPR  Discussed with Dr. Garcia who will see him in formal consultation          I have discussed this patient's plan of care and discharge plan at IDT rounds today with Case Management, Nursing, Nursing leadership, and other members of the IDT team.    Consultants/Specialty  Intensivist  admitted  Hospitalist  ID      Code Status  Full Code    Disposition  Patient is not medically cleared for discharge.   Anticipate discharge to  TBD .  I have placed the appropriate orders for post-discharge needs.    Review of Systems  Review of Systems   Unable to perform ROS: Medical condition      Physical Exam  Temp:  [36.1 °C (97 °F)-37.1 °C (98.8 °F)] 36.6 °C (97.9 °F)  Pulse:  [] 104  Resp:  [14-22] 22  BP: (118-132)/(77-90) 118/80  SpO2:  [90 %-95 %] 91 %    Physical Exam  Vitals and nursing note reviewed.   Constitutional:       Appearance: He is well-developed. He is not diaphoretic.   HENT:      Head: Normocephalic and atraumatic.      Mouth/Throat:      Pharynx: No oropharyngeal exudate.   Eyes:      General:         Right eye: No discharge.         Left eye: No discharge.      Conjunctiva/sclera: Conjunctivae normal.      Pupils: Pupils are equal, round, and reactive to light.   Neck:      Vascular: No JVD.      Trachea: No tracheal deviation.   Cardiovascular:      Rate and Rhythm: Normal rate and regular rhythm.      Heart sounds: No murmur heard.    No friction rub.   Pulmonary:      Effort: Pulmonary effort is normal. No respiratory distress.      Breath sounds: Normal breath sounds. No stridor.   Chest:      Chest wall: No tenderness.   Abdominal:      General: Bowel sounds are normal. There is no distension.      Palpations: Abdomen is soft.      Tenderness: There is no abdominal tenderness. There is no rebound.   Musculoskeletal:         General: No tenderness.      Cervical back: Neck supple.   Skin:     General: Skin is warm and dry.      Nails: There is no clubbing.   Neurological:      Mental Status: He is alert and oriented to person, place, and time.      Cranial Nerves: No cranial nerve deficit.      Motor: Weakness (Generalized) present. No abnormal muscle tone.   Psychiatric:         Cognition and Memory: Cognition is impaired. He exhibits impaired recent memory.       Fluids  No  intake or output data in the 24 hours ending 10/15/22 1545      Laboratory  Recent Labs     10/13/22  0221   WBC 8.6   RBC 4.89   HEMOGLOBIN 12.8*   HEMATOCRIT 39.9*   MCV 81.6   MCH 26.2*   MCHC 32.1*   RDW 42.8   PLATELETCT 256   MPV 9.0       Recent Labs     10/13/22  0221   SODIUM 141   POTASSIUM 3.8   CHLORIDE 105   CO2 27   GLUCOSE 119*   BUN 22   CREATININE 0.77   CALCIUM 8.2*                         Imaging  MR-BRAIN-WITH & W/O   Final Result      1.  No acute abnormality.   2.  Mild cerebral volume loss.   3.  There is no hippocampal sclerosis, neuronal migrational abnormality or cortical dysplasia.      EC-ECHOCARDIOGRAM COMPLETE W/O CONT   Final Result      CT-CTA CHEST PULMONARY ARTERY W/ RECONS   Final Result         1.  No pulmonary embolus appreciated.   2.  Moderate bilateral pleural effusions. Associated linear consolidations in the lung bases suggests atelectasis, component of infiltrate not excluded.   3.  Low-density left hepatic lobe lesion, could represent small cyst or hemangioma, otherwise indeterminate. Could be followed up with three-phase CT liver for further characterization.      DX-ABDOMEN FOR TUBE PLACEMENT   Final Result      1.  Enteric tube overlies the proximal stomach.      CT-HEAD W/O   Final Result      Negative noncontrast CT scan of the head / brain.         DX-CHEST-PORTABLE (1 VIEW)   Final Result      1.  Satisfactory lines and tubes as detailed.   2.  Diffuse bilateral pulmonary opacities are nonspecific, may be related to extensive pulmonary edema or infection.   3.  There may be layering small pleural effusions. No pneumothorax.             Assessment/Plan  * Possible infectious meningitis- (present on admission)  Assessment & Plan  Initial concern of potential CSF infection     Evaluated by ID with recommendations to complete 2 weeks course of doxycycline given recent tick bite with rash    MRI brain normal  Discussed with neurology Dr. Castañeda on 10/13/2022  recommendation is to obtain EEG and monitor patient as long as clinically improving If worsening then  consider repeat LP and check autoimmune encephalitis panel cell count and proteins  Discussed again with Dr. Garcia on 10/14/2022 and reviewed EEG findings and test results with him.  His impression at this time as this is likely related to tick bite with CNS infection.  Given overall slow clinical improvement he recommends completing treatment with doxycycline and outpatient follow-up with neurology.  He suggested sending serum paraneoplastic and autoimmune serology and if patient does not return to baseline consideration of repeat LP with outpatient neurology  10/15/2022 given persistent confusion formal consultation with neurology obtained with recommendation to repeat LP with autoimmune paraneoplastic and infectious serologies and high-dose Solu-Medrol 1 g daily for 5 days.  Discussed with ID Dr. Mccabe she agrees with current plan.  Dr. La graciously accepted to do LP later today  Updated patient's wife and family on plan of care and discussed with nursing staff    GERD (gastroesophageal reflux disease)  Assessment & Plan  Prilosec and Tums as needed    Elevated liver enzymes  Assessment & Plan  Abdominal exam is benign  LFTs trended down  Ammonia normal    Altered mental status  Assessment & Plan   acute encephalopathy    Empirically treated with antibiotics given recent tick infection  Discussed with ID LP done at outside facility not consistent with meningitis   continue empiric treatment with doxycycline   MRI with and without contrast negative    Tick bite of left lower leg  Assessment & Plan  Lower extremity rash resolved    Serologies were negative  Lyme PCR negative    ID recommended completing 2 weeks course of doxycycline    Paroxysmal A-fib (HCC)  Assessment & Plan  CHADSVasc score:1  Holding anticoagulation for now.  On low-dose aspirin reviewed with patient's wife    Hold aspirin       VTE  prophylaxis: Xarelto 10 mg daily as prophylaxis    I have performed a physical exam and reviewed and updated ROS and Plan today (10/15/2022). In review of yesterday's note (10/14/2022), there are no changes except as documented above.

## 2022-10-15 NOTE — CONSULTS
Physical Medicine and Rehabilitation Consultation              Date of initial consultation: 10/15/2022  Consulting provider: Familia Hickman M.D  Reason for consultation: assess for acute inpatient rehab appropriateness  LOS: 11 Day(s)    Chief complaint: AMS    HPI: The patient is a 57 y.o. right hand dominant male with a past medical history of obesity, sleep apnea, paroxysmal atrial fibrillation on Xarelto;  who presented on 10/4/2022  1:09 PM as a transfer from [facility] for worsening shortness of breath and headache.  Patient required intubation and sedation for witnessed seizure with altered mental status and hypoxia.  Patient had been bitten by a tick on his left leg 2 weeks prior to admission and developed a rash with associated fevers and headaches.  Patient went to urgent care was diagnosed with prostatitis and given antibiotics but did not improve.  Went to the emergency room on 9/30 and was discharged.  He presented on 10/2 and was worked up for tickborne illnesses including Lyme disease and [place] spotted fever.  Patient was assessed by neurology who feels this could be a CNS infection.  LP was obtained with CSF cultures.  Initial analysis showed elevated protein.  Etiology remains unknown. ID feels this is non-infectious and recommends continued neurology work up. Family states his condition is worsening. He currently has severe cognitive deficits, which is uncharacteristic of him.  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  Wife at bedside is very concerned, as are his brother and sister-in-law at bedside.  Family is reporting increased tremors, twitches, and increased confusion.    ROS  Pertinent positives are mentioned in the HPI, all others reviewed and are negative.    Social Hx:  2 SH  1 LUIS MANUEL  With: Spouse, adult children.  Adult son is on the autism spectrum and low functioning, spouse is caregiver.  Patient lives  in [place], [place].  Patient can stay on first floor.    Employment: CRNA    THERAPY:  Restrictions: Fall risk, swallow precautions  PT: Functional mobility   10/13: Walking 100 feet with front wheel walker contact-guard assist    OT: ADLs  10/13: Min assist dressing    SLP:   10/14: Patient needs supervision on discharge due to poor safety awareness, impairments in orientation, memory and judgment    IMAGING:  MR brain 10/12/2022  1.  No acute abnormality.  2.  Mild cerebral volume loss.  3.  There is no hippocampal sclerosis, neuronal migrational abnormality or cortical dysplasia.    PROCEDURES:  EEG 10/13/2022  This is an abnormal video EEG recording in the awake state only.   The presence of photoparoxysmal response may carry some association with photosensitive epilepsy in the correct clinical setting. Clinical correlate is recommended.     PMH:  No past medical history on file.    PSH:  No past surgical history on file.    FHX:  Non-pertinent to today's issues    Medications:  Current Facility-Administered Medications   Medication Dose    haloperidol lactate (HALDOL) injection 1 mg  1 mg    omeprazole (PRILOSEC) capsule 20 mg  20 mg    calcium carbonate (Tums) chewable tab 500 mg  500 mg    metoprolol SR (TOPROL XL) tablet 25 mg  25 mg    rivaroxaban (XARELTO) tablet 10 mg  10 mg    aspirin EC (ECOTRIN) tablet 81 mg  81 mg    furosemide (LASIX) injection 40 mg  40 mg    doxycycline monohydrate (ADOXA) tablet 100 mg  100 mg    senna-docusate (PERICOLACE or SENOKOT S) 8.6-50 MG per tablet 2 Tablet  2 Tablet    And    polyethylene glycol/lytes (MIRALAX) PACKET 1 Packet  1 Packet    And    magnesium hydroxide (MILK OF MAGNESIA) suspension 30 mL  30 mL    And    bisacodyl (DULCOLAX) suppository 10 mg  10 mg    acetaminophen (Tylenol) tablet 650 mg  650 mg    ondansetron (ZOFRAN) syringe/vial injection 4 mg  4 mg       Allergies:  Allergies   Allergen Reactions    Penicillins Rash     Rash as a child; tolerated  "ampicillin & ceftriaxone Oct 2022         Physical Exam:  Vitals: BP (!) 124/90   Pulse 61   Temp 36.4 °C (97.5 °F) (Temporal)   Resp 18   Ht 1.905 m (6' 3\")   Wt (!) 133 kg (293 lb 3.4 oz)   SpO2 92%   Gen: NAD  Head:  NC/AT  Eyes/ Nose/ Mouth: PERRLA, moist mucous membranes  Cardio: RRR, good distal perfusion, warm extremities  Pulm: normal respiratory effort, no cyanosis   Abd: Soft NTND, negative borborygmi   Ext: No peripheral edema. No calf tenderness. No clubbing.    Mental status: Decreased cognitive capacity, not oriented  Speech: Hypophonic speech, apraxic dysarthria    CRANIAL NERVES:  2,3: visual acuity grossly intact, PERRL  3,4,6: EOMI bilaterally, no nystagmus or diplopia  5: sensation intact to light touch bilaterally and symmetric  7: no facial asymmetry  8: hearing grossly intact  9,10: symmetric palate elevation  11: SCM/Trapezius strength 5/5 bilaterally  12: tongue protrudes slightly left    Motor:      Upper Extremity  Myotome R L   Shoulder flexion C5 5 5   Elbow flexion C5 5 5   Wrist extension C6 5 5   Elbow extension C7 5 5   Finger flexion C8 5 5   Finger abduction T1 5 5     Lower Extremity Myotome R L   Hip flexion L2 5 5   Knee extension L3 5 5   Ankle dorsiflexion L4 5 5   Toe extension L5 5 5   Ankle plantarflexion S1 5 5       Sensory:   intact to light touch through out    DTRs:  Right  Left    Brachioradialis  2+  2+   Patella tendon  2+ 2+     Moderate tremors in all 4 limbs    positive Llanos b/l     Tone: no spasticity noted, no cogwheeling noted    Coordination:   Altered finger rhea left    Labs: Reviewed and significant for   Recent Labs     10/13/22  0221   RBC 4.89   HEMOGLOBIN 12.8*   HEMATOCRIT 39.9*   PLATELETCT 256     Recent Labs     10/13/22  0221   SODIUM 141   POTASSIUM 3.8   CHLORIDE 105   CO2 27   GLUCOSE 119*   BUN 22   CREATININE 0.77   CALCIUM 8.2*     Recent Results (from the past 24 hour(s))   POCT glucose device results    Collection Time: 10/14/22 " 11:31 AM   Result Value Ref Range    POC Glucose, Blood 172 (H) 65 - 99 mg/dL         ASSESSMENT:  Patient is a 57 y.o. male admitted with altered mental status and disorientation, now with increasing tremors and decreasing cognitive ability.  Etiology unknown.  Currently on doxycycline as a precaution for Lyme, however infectious etiology is not suspected    [facility] Code / Diagnosis to Support: 0002.1 - Brain Dysfunction: Non-Traumatic    Rehabilitation: Impaired ADLs and mobility  Patient is a good candidate for inpatient rehab based on needs for PT, OT, and speech therapy.  Patient will also benefit from family training.  Patient has a good discharge situation which will be home with spouse.     Barriers to transfer include: Insurance authorization, TCCs to verify disposition, medical clearance and bed availability     All cases are subject to administrative review and recommendations may change    Disposition recommendations:  -Patient will benefit from IPR once medically cleared and etiology and treatment plan for his dysfunction is identified  -Plan to return to home in [place] after rehab  -TCC to follow for rehab appropriateness    Medical Complexity:    Altered mental status  -Etiology unknown  -MR brain WNL  -Consult placed to neurology to continue work-up.  Differential is broad and includes encephalitis, possible seizures  -Unlikely to be infectious etiology per ID  -On doxycycline 14-day course prophylactically  -Continue PT OT and SLP while in-house  -Plan for IPR once etiology and treatment plan has been identified  -No role for neuro stimulants at this time  -Bowel and bladder under volitional control    Hypertension  -Lasix  -Metoprolol      DVT PPX: Xarelto 10 mg tab daily      Thank you for allowing us to participate in the care of this patient.     Patient was seen for 85 minutes on unit/floor of which > 50% of time was spent on counseling and coordination of care regarding the above, including  prognosis, risk reduction, benefits of treatment, and options for next stage of care.    Chintan Branch, DO   Physical Medicine and Rehabilitation     Please note that this dictation was created using voice recognition software. I have made every reasonable attempt to correct obvious errors, but there may be errors of grammar and possibly content that I did not discover before finalizing the note.

## 2022-10-15 NOTE — PROGRESS NOTES
Monitor summary: SR, HR 82-90, WV 0.16, QRS 0.06, QT 0.40 with (R) PVCs per strip from monitor room

## 2022-10-15 NOTE — CONSULTS
Neurology Initial Consult H&P  Neurohospitalist Service, [facility]    Referring Physician: Familia Hickman M.D.    No chief complaint on file.      HPI: 57-year-old male has been admitted here since the fourth.  Transfer from outside facility.  Most of the information is obtained from the chart and from the family at bedside.  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  Started after a tick bite on his leg.  Started have a rash later that week.  Then fevers and headaches.  The confusion started soon afterwards.  It was at outside facility had an MRI brain reported as unremarkable.  An LP slight elevation protein only.  Had a seizure and was then intubated.  He was then transferred here on the fourth.  He was extubated next day and transferred to the ICU a few days later.  A repeat MRI brain was done on the 11th this month which is also unremarkable.  Patient mentation has been waxing waning.  Did improve initially but per the wife's been getting worse the past few days now.  He knows he is at [facility].  However he does not have clear insight of disease process.  Cannot do any type of complex calculations or answer complex questions.  He has become tremulous the past few days now.  He has been treated for Lyme disease per ID.  Multiple infectious disease labs have been negative since he been admitted here.        Review of systems: In addition to what is detailed in the HPI above, (and scanned into the chart if and when applicable), all other systems reviewed and are negative.    Past Medical History:   A. fib on Xarelto  FHx:  No early neurological degenerative diseases  SHx:   reports that he has never smoked. He has never used smokeless tobacco. He reports that he does not drink alcohol and does not use drugs.    Allergies:  Allergies   Allergen Reactions    Penicillins Rash     Rash as a child; tolerated ampicillin & ceftriaxone Oct 2022       Medications:    Current  Facility-Administered Medications:     haloperidol lactate (HALDOL) injection 1 mg, 1 mg, Intravenous, Q4HRS PRN, Familia Hickman M.D., 1 mg at 10/14/22 1230    omeprazole (PRILOSEC) capsule 20 mg, 20 mg, Oral, DAILY, Familia Hickman M.D., 20 mg at 10/15/22 0443    calcium carbonate (Tums) chewable tab 500 mg, 500 mg, Oral, TID PRN, Familia Hickman M.D., 500 mg at 10/12/22 1543    metoprolol SR (TOPROL XL) tablet 25 mg, 25 mg, Oral, Q DAY, Familia Hickman M.D., 25 mg at 10/15/22 0442    rivaroxaban (XARELTO) tablet 10 mg, 10 mg, Oral, DAILY AT 1800, Familia Hickman M.D., 10 mg at 10/14/22 1702    aspirin EC (ECOTRIN) tablet 81 mg, 81 mg, Oral, DAILY, Manuel Bradley M.D., 81 mg at 10/15/22 0442    furosemide (LASIX) injection 40 mg, 40 mg, Intravenous, Q DAY, Jennifer Pineda M.D., 40 mg at 10/15/22 0449    doxycycline monohydrate (ADOXA) tablet 100 mg, 100 mg, Oral, Q12HRS, Jennifer Pineda M.D., 100 mg at 10/15/22 0443    senna-docusate (PERICOLACE or SENOKOT S) 8.6-50 MG per tablet 2 Tablet, 2 Tablet, Oral, BID, 2 Tablet at 10/15/22 0444 **AND** polyethylene glycol/lytes (MIRALAX) PACKET 1 Packet, 1 Packet, Oral, QDAY PRN, 1 Packet at 10/15/22 0445 **AND** magnesium hydroxide (MILK OF MAGNESIA) suspension 30 mL, 30 mL, Oral, QDAY PRN, 30 mL at 10/10/22 1109 **AND** bisacodyl (DULCOLAX) suppository 10 mg, 10 mg, Rectal, QDAY PRN, Jennifer Pineda M.D.    acetaminophen (Tylenol) tablet 650 mg, 650 mg, Oral, Q4HRS PRN, Jennifer Pineda M.D., 650 mg at 10/15/22 0909    ondansetron (ZOFRAN) syringe/vial injection 4 mg, 4 mg, Intravenous, Q6HRS PRN, Ruben Staples M.D., 4 mg at 10/12/22 1523    Physical Examination:     Vitals:    10/14/22 2356 10/15/22 0206 10/15/22 0319 10/15/22 0653   BP: 129/86  132/87 (!) 124/90   Pulse: 83 85 66 61   Resp: 17  18 18   Temp: 37.1 °C (98.8 °F)  37.1 °C (98.8 °F) 36.4 °C (97.5 °F)   TempSrc: Temporal  Temporal Temporal   SpO2: 95%  90% 92%    Weight:       Height:           General: Patient is awake and in no acute distress  Eyes: Unremarkable  CV: RRR    NEUROLOGICAL EXAM:     Mental status: He is awake and alert and oriented himself and family knows in [facility] but not to situation  Speech and language: speech is clear and fluent. The patient is able to name and repeat.  Cranial nerve exam: Pupils are equal, round and reactive to light bilaterally. Visual fields are full. Extraocular muscles are intact. Sensation in the face is intact to light touch. Face is symmetric. Hearing intact. Palate elevates symmetrically. Shoulder shrug is full. Tongue is midline.  Motor exam: Sustained antigravity strength in all 4.  Slight increased tone throughout.  No resting tremor however he does have a postural tremor at times  Sensory exam: No sensory deficits identified   Deep tendon +2 bilateral patellar's with upgoing toes no clonus.  Positive snout reflex.  Startles easily  Coordination: no ataxia   Gait: Needs 2 person assist    Objective Data:    Labs:  Lab Results   Component Value Date/Time    PROTHROMBTM 14.5 10/06/2022 01:31 PM    INR 1.14 (H) 10/06/2022 01:31 PM      Lab Results   Component Value Date/Time    WBC 8.6 10/13/2022 02:21 AM    RBC 4.89 10/13/2022 02:21 AM    HEMOGLOBIN 12.8 (L) 10/13/2022 02:21 AM    HEMATOCRIT 39.9 (L) 10/13/2022 02:21 AM    MCV 81.6 10/13/2022 02:21 AM    MCH 26.2 (L) 10/13/2022 02:21 AM    MCHC 32.1 (L) 10/13/2022 02:21 AM    MPV 9.0 10/13/2022 02:21 AM    NEUTSPOLYS 37.40 (L) 10/13/2022 02:21 AM    LYMPHOCYTES 45.30 (H) 10/13/2022 02:21 AM    MONOCYTES 10.40 10/13/2022 02:21 AM    EOSINOPHILS 4.90 10/13/2022 02:21 AM    BASOPHILS 0.80 10/13/2022 02:21 AM    ANISOCYTOSIS 1+ 10/05/2022 06:30 AM      Lab Results   Component Value Date/Time    SODIUM 141 10/13/2022 02:21 AM    POTASSIUM 3.8 10/13/2022 02:21 AM    CHLORIDE 105 10/13/2022 02:21 AM    CO2 27 10/13/2022 02:21 AM    GLUCOSE 119 (H) 10/13/2022 02:21 AM    BUN 22  10/13/2022 02:21 AM    CREATININE 0.77 10/13/2022 02:21 AM      Lab Results   Component Value Date/Time    TRIGLYCERIDE 310 (H) 10/07/2022 04:24 AM       Lab Results   Component Value Date/Time    ALKPHOSPHAT 57 10/13/2022 02:21 AM    ASTSGOT 39 10/13/2022 02:21 AM    ALTSGPT 70 (H) 10/13/2022 02:21 AM    TBILIRUBIN 0.5 10/13/2022 02:21 AM        Imaging/Testing:  MR-BRAIN-WITH & W/O   Final Result      1.  No acute abnormality.   2.  Mild cerebral volume loss.   3.  There is no hippocampal sclerosis, neuronal migrational abnormality or cortical dysplasia.      EC-ECHOCARDIOGRAM COMPLETE W/O CONT   Final Result      CT-CTA CHEST PULMONARY ARTERY W/ RECONS   Final Result         1.  No pulmonary embolus appreciated.   2.  Moderate bilateral pleural effusions. Associated linear consolidations in the lung bases suggests atelectasis, component of infiltrate not excluded.   3.  Low-density left hepatic lobe lesion, could represent small cyst or hemangioma, otherwise indeterminate. Could be followed up with three-phase CT liver for further characterization.      DX-ABDOMEN FOR TUBE PLACEMENT   Final Result      1.  Enteric tube overlies the proximal stomach.      CT-HEAD W/O   Final Result      Negative noncontrast CT scan of the head / brain.         DX-CHEST-PORTABLE (1 VIEW)   Final Result      1.  Satisfactory lines and tubes as detailed.   2.  Diffuse bilateral pulmonary opacities are nonspecific, may be related to extensive pulmonary edema or infection.   3.  There may be layering small pleural effusions. No pneumothorax.            Assessment and Plan:    57-year-old male presents with month duration of encephalopathy with tremulousness, hyperreflexia, rash, fevers with negative neuroimaging and benign LP at beginning of the disease process.  Per family all started after a tick bite.  From my perspective this has to be related to the tick bite.  I cannot think of a noninfectious related neurological disease that  spontaneously causes a rash, fevers, and altered mental status this acutely.  It is possible the patient has a postinfectious autoimmune disease process.  Which again is related to CNS infection.  The other option is ignoring the fever, rash and the tick bite and just assumed this was just a big coincidence.  But even then the neurological differential is not great with an idiopathic autoimmune encephalitis being the best diagnosis at this time assuming the fever and rash and tick bite not related.      Recommend repeating the lumbar puncture getting a cell count, glucose, protein, IgG, IgG index, viral studies (Lyme, West Nile, crypto, etc.), paraneoplastic panel (serum and CSF), oligoclonal bands, NMDA, cytology, flow cytometric, Whipple PCR    B12 and TSH and heavy metal screen    Consider empirically treating with high-dose steroids Solu-Medrol 1 g daily for 5 doses if the primary service and ID have no objections.      For the EEG findings is nonspecific.  Patient has not had any further seizure activity would not empirically treat given this finding at this time.        The evaluation of the patient, and recommended management, was discussed with the resident staff.       This chart was partially generated using voice recognition technology and sound alike word replacement may be present, best efforts were made to make the chart accurate.    Josh Garcia MD  Board Certified Neurology, ABPN  t) [phone]

## 2022-10-15 NOTE — CARE PLAN
The patient is Stable - Low risk of patient condition declining or worsening    Shift Goals  Clinical Goals: Safety  Patient Goals: Sleep  Family Goals: LINDA    Progress made toward(s) clinical / shift goals:      Problem: Skin Integrity  Goal: Skin integrity is maintained or improved  Outcome: Progressing     Problem: Fall Risk  Goal: Patient will remain free from falls  Outcome: Progressing    Patient is not progressing towards the following goals:

## 2022-10-16 PROBLEM — R03.0 ELEVATED BLOOD PRESSURE READING: Status: ACTIVE | Noted: 2022-10-16

## 2022-10-16 PROBLEM — R73.9 STEROID-INDUCED HYPERGLYCEMIA: Status: ACTIVE | Noted: 2022-10-16

## 2022-10-16 PROBLEM — T38.0X5A STEROID-INDUCED HYPERGLYCEMIA: Status: ACTIVE | Noted: 2022-10-16

## 2022-10-16 LAB
ANION GAP SERPL CALC-SCNC: 14 MMOL/L (ref 7–16)
BASOPHILS # BLD AUTO: 0.3 % (ref 0–1.8)
BASOPHILS # BLD: 0.03 K/UL (ref 0–0.12)
BUN SERPL-MCNC: 35 MG/DL (ref 8–22)
C GATTII+NEOFOR DNA CSF QL NAA+NON-PROBE: NOT DETECTED
CALCIUM SERPL-MCNC: 9.4 MG/DL (ref 8.5–10.5)
CHLORIDE SERPL-SCNC: 104 MMOL/L (ref 96–112)
CMV DNA CSF QL NAA+NON-PROBE: NOT DETECTED
CO2 SERPL-SCNC: 22 MMOL/L (ref 20–33)
CREAT SERPL-MCNC: 1.06 MG/DL (ref 0.5–1.4)
E COLI K1 DNA CSF QL NAA+NON-PROBE: NOT DETECTED
EOSINOPHIL # BLD AUTO: 0.01 K/UL (ref 0–0.51)
EOSINOPHIL NFR BLD: 0.1 % (ref 0–6.9)
ERYTHROCYTE [DISTWIDTH] IN BLOOD BY AUTOMATED COUNT: 43.1 FL (ref 35.9–50)
EV RNA CSF QL NAA+NON-PROBE: NOT DETECTED
GFR SERPLBLD CREATININE-BSD FMLA CKD-EPI: 83 ML/MIN/1.73 M 2
GLUCOSE BLD STRIP.AUTO-MCNC: 204 MG/DL (ref 65–99)
GLUCOSE BLD STRIP.AUTO-MCNC: 205 MG/DL (ref 65–99)
GLUCOSE BLD STRIP.AUTO-MCNC: 206 MG/DL (ref 65–99)
GLUCOSE BLD STRIP.AUTO-MCNC: 243 MG/DL (ref 65–99)
GLUCOSE SERPL-MCNC: 218 MG/DL (ref 65–99)
GP B STREP DNA CSF QL NAA+NON-PROBE: NOT DETECTED
HAEM INFLU DNA CSF QL NAA+NON-PROBE: NOT DETECTED
HCT VFR BLD AUTO: 49.2 % (ref 42–52)
HGB BLD-MCNC: 15.6 G/DL (ref 14–18)
HHV6 DNA CSF QL NAA+NON-PROBE: NOT DETECTED
HSV1 DNA CSF QL NAA+NON-PROBE: NOT DETECTED
HSV2 DNA CSF QL NAA+NON-PROBE: NOT DETECTED
IMM GRANULOCYTES # BLD AUTO: 0.09 K/UL (ref 0–0.11)
IMM GRANULOCYTES NFR BLD AUTO: 1 % (ref 0–0.9)
L MONOCYTOG DNA CSF QL NAA+NON-PROBE: NOT DETECTED
LYMPHOCYTES # BLD AUTO: 3.2 K/UL (ref 1–4.8)
LYMPHOCYTES NFR BLD: 34.3 % (ref 22–41)
MAGNESIUM SERPL-MCNC: 2.4 MG/DL (ref 1.5–2.5)
MCH RBC QN AUTO: 26 PG (ref 27–33)
MCHC RBC AUTO-ENTMCNC: 31.7 G/DL (ref 33.7–35.3)
MCV RBC AUTO: 82.1 FL (ref 81.4–97.8)
MONOCYTES # BLD AUTO: 0.12 K/UL (ref 0–0.85)
MONOCYTES NFR BLD AUTO: 1.3 % (ref 0–13.4)
N MEN DNA CSF QL NAA+NON-PROBE: NOT DETECTED
NEUTROPHILS # BLD AUTO: 5.89 K/UL (ref 1.82–7.42)
NEUTROPHILS NFR BLD: 63 % (ref 44–72)
NRBC # BLD AUTO: 0 K/UL
NRBC BLD-RTO: 0 /100 WBC
PARECHOVIRUS A RNA CSF QL NAA+NON-PROBE: NOT DETECTED
PHOSPHATE SERPL-MCNC: 4.1 MG/DL (ref 2.5–4.5)
PLATELET # BLD AUTO: 324 K/UL (ref 164–446)
PMV BLD AUTO: 9 FL (ref 9–12.9)
POTASSIUM SERPL-SCNC: 4.6 MMOL/L (ref 3.6–5.5)
RBC # BLD AUTO: 5.99 M/UL (ref 4.7–6.1)
S PNEUM DNA CSF QL NAA+NON-PROBE: NOT DETECTED
SODIUM SERPL-SCNC: 140 MMOL/L (ref 135–145)
VZV DNA CSF QL NAA+NON-PROBE: NOT DETECTED
WBC # BLD AUTO: 9.3 K/UL (ref 4.8–10.8)

## 2022-10-16 PROCEDURE — 36415 COLL VENOUS BLD VENIPUNCTURE: CPT

## 2022-10-16 PROCEDURE — 700102 HCHG RX REV CODE 250 W/ 637 OVERRIDE(OP): Performed by: HOSPITALIST

## 2022-10-16 PROCEDURE — 83735 ASSAY OF MAGNESIUM: CPT

## 2022-10-16 PROCEDURE — 93005 ELECTROCARDIOGRAM TRACING: CPT

## 2022-10-16 PROCEDURE — A9270 NON-COVERED ITEM OR SERVICE: HCPCS | Performed by: HOSPITALIST

## 2022-10-16 PROCEDURE — 700102 HCHG RX REV CODE 250 W/ 637 OVERRIDE(OP): Performed by: STUDENT IN AN ORGANIZED HEALTH CARE EDUCATION/TRAINING PROGRAM

## 2022-10-16 PROCEDURE — 700111 HCHG RX REV CODE 636 W/ 250 OVERRIDE (IP): Performed by: HOSPITALIST

## 2022-10-16 PROCEDURE — 94760 N-INVAS EAR/PLS OXIMETRY 1: CPT

## 2022-10-16 PROCEDURE — 84100 ASSAY OF PHOSPHORUS: CPT

## 2022-10-16 PROCEDURE — 82962 GLUCOSE BLOOD TEST: CPT

## 2022-10-16 PROCEDURE — 51798 US URINE CAPACITY MEASURE: CPT

## 2022-10-16 PROCEDURE — 85025 COMPLETE CBC W/AUTO DIFF WBC: CPT

## 2022-10-16 PROCEDURE — 80048 BASIC METABOLIC PNL TOTAL CA: CPT

## 2022-10-16 PROCEDURE — 700102 HCHG RX REV CODE 250 W/ 637 OVERRIDE(OP): Performed by: INTERNAL MEDICINE

## 2022-10-16 PROCEDURE — 97535 SELF CARE MNGMENT TRAINING: CPT

## 2022-10-16 PROCEDURE — 700111 HCHG RX REV CODE 636 W/ 250 OVERRIDE (IP): Performed by: STUDENT IN AN ORGANIZED HEALTH CARE EDUCATION/TRAINING PROGRAM

## 2022-10-16 PROCEDURE — 94660 CPAP INITIATION&MGMT: CPT

## 2022-10-16 PROCEDURE — A9270 NON-COVERED ITEM OR SERVICE: HCPCS | Performed by: INTERNAL MEDICINE

## 2022-10-16 PROCEDURE — 700105 HCHG RX REV CODE 258: Performed by: HOSPITALIST

## 2022-10-16 PROCEDURE — 770020 HCHG ROOM/CARE - TELE (206)

## 2022-10-16 PROCEDURE — 99232 SBSQ HOSP IP/OBS MODERATE 35: CPT | Performed by: HOSPITALIST

## 2022-10-16 PROCEDURE — A9270 NON-COVERED ITEM OR SERVICE: HCPCS | Performed by: STUDENT IN AN ORGANIZED HEALTH CARE EDUCATION/TRAINING PROGRAM

## 2022-10-16 RX ORDER — AMLODIPINE BESYLATE 5 MG/1
5 TABLET ORAL
Status: DISCONTINUED | OUTPATIENT
Start: 2022-10-16 | End: 2022-10-19 | Stop reason: HOSPADM

## 2022-10-16 RX ORDER — DEXTROSE MONOHYDRATE 25 G/50ML
25 INJECTION, SOLUTION INTRAVENOUS
Status: DISCONTINUED | OUTPATIENT
Start: 2022-10-16 | End: 2022-10-19 | Stop reason: HOSPADM

## 2022-10-16 RX ORDER — ASPIRIN 81 MG/1
81 TABLET, CHEWABLE ORAL DAILY
Status: DISCONTINUED | OUTPATIENT
Start: 2022-10-16 | End: 2022-10-19 | Stop reason: HOSPADM

## 2022-10-16 RX ADMIN — DOXYCYCLINE 100 MG: 100 TABLET, FILM COATED ORAL at 04:50

## 2022-10-16 RX ADMIN — INSULIN HUMAN 3 UNITS: 100 INJECTION, SOLUTION PARENTERAL at 17:37

## 2022-10-16 RX ADMIN — AMLODIPINE BESYLATE 5 MG: 5 TABLET ORAL at 17:37

## 2022-10-16 RX ADMIN — OMEPRAZOLE 20 MG: 20 CAPSULE, DELAYED RELEASE ORAL at 04:51

## 2022-10-16 RX ADMIN — SODIUM CHLORIDE 1000 MG: 9 INJECTION, SOLUTION INTRAVENOUS at 05:30

## 2022-10-16 RX ADMIN — SENNOSIDES AND DOCUSATE SODIUM 2 TABLET: 50; 8.6 TABLET ORAL at 17:38

## 2022-10-16 RX ADMIN — INSULIN HUMAN 3 UNITS: 100 INJECTION, SOLUTION PARENTERAL at 12:03

## 2022-10-16 RX ADMIN — RIVAROXABAN 10 MG: 10 TABLET, FILM COATED ORAL at 17:38

## 2022-10-16 RX ADMIN — INSULIN HUMAN 3 UNITS: 100 INJECTION, SOLUTION PARENTERAL at 21:04

## 2022-10-16 RX ADMIN — ASPIRIN 81 MG 81 MG: 81 TABLET ORAL at 17:47

## 2022-10-16 RX ADMIN — FUROSEMIDE 40 MG: 10 INJECTION, SOLUTION INTRAMUSCULAR; INTRAVENOUS at 04:42

## 2022-10-16 RX ADMIN — METOPROLOL SUCCINATE 25 MG: 25 TABLET, FILM COATED, EXTENDED RELEASE ORAL at 04:51

## 2022-10-16 RX ADMIN — DOXYCYCLINE 100 MG: 100 TABLET, FILM COATED ORAL at 17:38

## 2022-10-16 ASSESSMENT — ENCOUNTER SYMPTOMS
FEVER: 0
ABDOMINAL PAIN: 0
VOMITING: 0
NAUSEA: 0
SHORTNESS OF BREATH: 0

## 2022-10-16 ASSESSMENT — COGNITIVE AND FUNCTIONAL STATUS - GENERAL
DRESSING REGULAR LOWER BODY CLOTHING: A LITTLE
PERSONAL GROOMING: A LITTLE
TOILETING: A LITTLE
DAILY ACTIVITIY SCORE: 19
DRESSING REGULAR UPPER BODY CLOTHING: A LITTLE
SUGGESTED CMS G CODE MODIFIER DAILY ACTIVITY: CK
HELP NEEDED FOR BATHING: A LITTLE

## 2022-10-16 ASSESSMENT — PAIN DESCRIPTION - PAIN TYPE: TYPE: ACUTE PAIN

## 2022-10-16 NOTE — THERAPY
Occupational Therapy  Daily Treatment     Patient Name: Josh Trivedi  Age:  57 y.o., Sex:  male  Medical Record #: [id number]  Today's Date: 10/16/2022     Precautions  Precautions: Fall Risk, Swallow Precautions ( See Comments)    Assessment    Pt seen for OT tx. Pt A+O x3 at time of session. Pt able to state short sentences this session. Pt demonstrated difficulty w/ divided attn during ADL tasks. Pt participated in toileting and standing handwashing w/ CGA due to impaired balance. Pt functional performance limited by impaired strength, balance, and cognition. Pt will continue to benefit from skilled OT while admitted to acute care.     Plan    Continue current treatment plan.    DC Equipment Recommendations: Unable to determine at this time  Discharge Recommendations: Recommend home health for continued occupational therapy services     Objective     10/16/22 1245   Non Verbal Descriptors   Non Verbal Scale  Calm   Cognition    Cognition / Consciousness X   Speech/ Communication Delayed Responses   Orientation Level   (A+O x3)   Level of Consciousness Alert   Attention Impaired   Comments Pleasant and cooperative, able to state short sentences.   Balance   Sitting Balance (Static) Fair +   Sitting Balance (Dynamic) Fair   Standing Balance (Static) Fair -   Standing Balance (Dynamic) Fair -   Weight Shift Sitting Fair   Weight Shift Standing Fair   Skilled Intervention Verbal Cuing;Facilitation;Tactile Cuing   Comments w/ FWW   Bed Mobility    Supine to Sit Standby Assist   Sit to Supine   (up to chair post)   Scooting Standby Assist   Rolling Standby Assist   Skilled Intervention Verbal Cuing;Facilitation   Comments HOB slightly elevated, use of rails   Activities of Daily Living   Grooming Contact Guard Assist  (washed hands at sink)   Toileting Contact Guard Assist  (urination/BM in toilet)   Skilled Intervention Verbal Cuing;Tactile Cuing;Facilitation   Functional Mobility   Sit to Stand Standby Assist   Bed,  Chair, Wheelchair Transfer Standby Assist   Toilet Transfers Contact Guard Assist  (Due to limited balance and cues for safe use of FWW)   Transfer Method Stand Step   Mobility EOB>bathroom>chair   Skilled Intervention Verbal Cuing;Tactile Cuing;Facilitation   Comments w/ FWW   Activity Tolerance   Sitting in Chair up in chair post   Sitting Edge of Bed <5 min   Standing <5 min   Comments no reports of pain/fatigue   Patient / Family Goals   Patient / Family Goal #1 to go home   Short Term Goals   Short Term Goal # 1 Pt will complete ADL transfers with supervision   Goal Outcome # 1 Progressing as expected   Short Term Goal # 2 Pt will complete LB dressing with supervision   Goal Outcome # 2 Goal not met  (NT this session)   Short Term Goal # 3 Pt will complete toileting with supervision   Goal Outcome # 3 Progressing as expected   Education Group   Education Provided Role of Occupational Therapist;Activities of Daily Living   Role of Occupational Therapist Patient Response Patient;Acceptance;Explanation;Verbal Demonstration   ADL Patient Response Patient;Acceptance;Explanation;Demonstration;Verbal Demonstration;Action Demonstration

## 2022-10-16 NOTE — PROCEDURES
Procedure Lumbar Puncture    Date/Time: 10/15/2022 5:44 PM  Performed by: Marco Olson M.D.  Authorized by: Marco Olson M.D.     Consent:     Consent obtained:  Written    Consent given by:  Patient and spouse    Risks discussed:  Bleeding, infection, pain and nerve damage    Alternatives discussed:  No treatment  Universal protocol:     Procedure explained and questions answered to patient or proxy's satisfaction: yes      Relevant documents present and verified: yes      Immediately prior to procedure a time out was called: yes      Site/side marked: yes      Patient identity confirmed:  Verbally with patient and provided demographic data  Pre-procedure details:     Procedure purpose:  Diagnostic    Preparation: Patient was prepped and draped in usual sterile fashion    Sedation:     Sedation type:  Anxiolysis  Anesthesia:     Anesthesia method:  Local infiltration    Local anesthetic:  Lidocaine 1% w/o epi  Procedure details:     Lumbar space:  L4-L5 interspace    Patient position:  Sitting    Needle gauge:  20    Needle type:  Spinal needle - Quincke tip    Needle length (in):  3.5    Ultrasound guidance: no      Number of attempts:  2    Fluid appearance:  Cloudy    Tubes of fluid:  4    Total volume (ml):  13  Post-procedure:     Puncture site:  Adhesive bandage applied    Patient tolerance of procedure:  Tolerated with difficulty  Comments:      Started in lateral decubitus position.  Failed at L5-S1.  Reattempt sitting at L4-5.

## 2022-10-16 NOTE — PROGRESS NOTES
Hospital Medicine Daily Progress Note    Date of Service  10/16/2022    Chief Complaint  Josh Trivedi is a 57 y.o. male admitted 10/4/2022 with No chief complaint on file.      Hospital Course  No notes on file  Josh Trivedi is a 57 y.o. male with a history of obesity, sleep apnea, paroxysmal atrial fibrillation on Xarelto who presented 10/4/2022 with transfer from [facility] on 10/2 for worsening shortness of breath fevers and headache.  Upon admission he had been intubated and sedated due to witnessed seizure on 10/4 with altered mental status and hypoxia.     Patient is from [place] and approximately 2 weeks prior to admission had been bitten by a tick on the left lower extremity.  He had a rash of his bilateral legs associated with fever and headaches.  He went to an urgent care and was diagnosed with prostatitis and given antibiotics but he did not improve.  He went to the emergency room on 9/30 for fevers chills shortness of breath and headache.  Patient was discharged but presented again on 10/2 which led to current hospitalization.  At outside hospital he was worked up for potential tickborne illness including Lyme disease, [place] spotted fever, and meningitis.  Lumbar puncture obtained and CSF cultures, and protein (elevated 57), cell count and normal CSF glucose.  He has had elevated liver enzymes.  Patient was negative for COVID, HIV.  Infectious diseases been consulting and has concern of [place] spotted fever.      Interval Problem Update    patient is significantly improved  AO x3  Positive serum CMV PCR discussed with Dr. Mccabe from ID  Unlikely to be etiology of his symptoms but will likely treat since he is immune suppressed with high-dose steroids she would like to wait 48 hours to see effect of steroids        I have discussed this patient's plan of care and discharge plan at IDT rounds today with Case Management, Nursing, Nursing leadership, and other members of  the IDT team.    Consultants/Specialty  Intensivist admitted  Hospitalist  ID      Code Status  Full Code    Disposition  Patient is not medically cleared for discharge.   Anticipate discharge to  D .  I have placed the appropriate orders for post-discharge needs.    Review of Systems  Review of Systems   Constitutional:  Positive for malaise/fatigue. Negative for fever.   Respiratory:  Negative for shortness of breath.    Cardiovascular:  Negative for chest pain.   Gastrointestinal:  Negative for abdominal pain, nausea and vomiting.   All other systems reviewed and are negative.     Physical Exam  Temp:  [36.1 °C (97 °F)-36.5 °C (97.7 °F)] 36.2 °C (97.2 °F)  Pulse:  [] 109  Resp:  [17-20] 18  BP: (119-141)/(82-96) 141/96  SpO2:  [85 %-96 %] 89 %    Physical Exam  Vitals and nursing note reviewed.   Constitutional:       Appearance: He is well-developed. He is not diaphoretic.   HENT:      Head: Normocephalic and atraumatic.      Mouth/Throat:      Pharynx: No oropharyngeal exudate.   Eyes:      General: No scleral icterus.        Right eye: No discharge.         Left eye: No discharge.      Conjunctiva/sclera: Conjunctivae normal.      Pupils: Pupils are equal, round, and reactive to light.   Neck:      Vascular: No JVD.      Trachea: No tracheal deviation.   Cardiovascular:      Rate and Rhythm: Normal rate and regular rhythm.      Heart sounds: No murmur heard.    No friction rub. No gallop.   Pulmonary:      Effort: Pulmonary effort is normal. No respiratory distress.      Breath sounds: Normal breath sounds. No stridor. No wheezing.   Chest:      Chest wall: No tenderness.   Abdominal:      General: Bowel sounds are normal. There is no distension.      Palpations: Abdomen is soft.      Tenderness: There is no abdominal tenderness. There is no rebound.   Musculoskeletal:         General: No tenderness.      Cervical back: Neck supple.   Skin:     General: Skin is warm and dry.      Nails: There is no  clubbing.   Neurological:      Mental Status: He is alert and oriented to person, place, and time.      Cranial Nerves: No cranial nerve deficit.      Motor: Weakness (Generalized) present. No abnormal muscle tone.   Psychiatric:         Behavior: Behavior normal.       Fluids    Intake/Output Summary (Last 24 hours) at 10/16/2022 1523  Last data filed at 10/16/2022 0800  Gross per 24 hour   Intake 337 ml   Output 1400 ml   Net -1063 ml         Laboratory  Recent Labs     10/16/22  0351   WBC 9.3   RBC 5.99   HEMOGLOBIN 15.6   HEMATOCRIT 49.2   MCV 82.1   MCH 26.0*   MCHC 31.7*   RDW 43.1   PLATELETCT 324   MPV 9.0       Recent Labs     10/16/22  0045   SODIUM 140   POTASSIUM 4.6   CHLORIDE 104   CO2 22   GLUCOSE 218*   BUN 35*   CREATININE 1.06   CALCIUM 9.4                         Imaging  MR-BRAIN-WITH & W/O   Final Result      1.  No acute abnormality.   2.  Mild cerebral volume loss.   3.  There is no hippocampal sclerosis, neuronal migrational abnormality or cortical dysplasia.      EC-ECHOCARDIOGRAM COMPLETE W/O CONT   Final Result      CT-CTA CHEST PULMONARY ARTERY W/ RECONS   Final Result         1.  No pulmonary embolus appreciated.   2.  Moderate bilateral pleural effusions. Associated linear consolidations in the lung bases suggests atelectasis, component of infiltrate not excluded.   3.  Low-density left hepatic lobe lesion, could represent small cyst or hemangioma, otherwise indeterminate. Could be followed up with three-phase CT liver for further characterization.      DX-ABDOMEN FOR TUBE PLACEMENT   Final Result      1.  Enteric tube overlies the proximal stomach.      CT-HEAD W/O   Final Result      Negative noncontrast CT scan of the head / brain.         DX-CHEST-PORTABLE (1 VIEW)   Final Result      1.  Satisfactory lines and tubes as detailed.   2.  Diffuse bilateral pulmonary opacities are nonspecific, may be related to extensive pulmonary edema or infection.   3.  There may be layering small  pleural effusions. No pneumothorax.             Assessment/Plan  * Possible infectious meningitis- (present on admission)  Assessment & Plan  Initial concern of potential CSF infection     Evaluated by ID with recommendations to complete 2 weeks course of doxycycline given recent tick bite with rash    MRI brain normal  Formal neurology consult on 10/15/2022 with recommendation to repeat LP and initiate high-dose Solu-Medrol 1 g daily for 5 days  Discussed with ID  Follow-up on CSF  Continue IV Solu-Medrol patient appears clinically improved today      Elevated blood pressure reading  Assessment & Plan  Likely secondary to steroids    Continue metoprolol add amlodipine and monitor    Steroid-induced hyperglycemia  Assessment & Plan  Insulin sliding scale monitor CBGs    GERD (gastroesophageal reflux disease)  Assessment & Plan  Prilosec and Tums as needed    Elevated liver enzymes  Assessment & Plan  Abdominal exam is benign  LFTs trended down  Ammonia normal    Altered mental status  Assessment & Plan   acute encephalopathy    Empirically treated with antibiotics given recent tick infection  Discussed with ID LP done at outside facility not consistent with meningitis  MRI with and without contrast negative  Appreciate neurology input follow-up on repeat LP results  Neurology recommending empiric treatment with Solu-Medrol 1 g daily for 5 days  Patient clinically improved continue close monitoring    Tick bite of left lower leg  Assessment & Plan  Lower extremity rash resolved    Serologies were negative  Lyme PCR negative    ID recommended completing 2 weeks course of doxycycline    Paroxysmal A-fib (HCC)  Assessment & Plan  CHADSVasc score:1  Holding anticoagulation for now.    Low-dose aspirin       VTE prophylaxis: Xarelto 10 mg daily as prophylaxis    I have performed a physical exam and reviewed and updated ROS and Plan today (10/16/2022). In review of yesterday's note (10/15/2022), there are no changes except  as documented above.

## 2022-10-16 NOTE — CARE PLAN
The patient is Watcher - Medium risk of patient condition declining or worsening    Shift Goals  Clinical Goals: Monitor neuro status, safetu  Patient Goals: LINDA  Family Goals: LINDA    Progress made toward(s) clinical / shift goals:    Problem: Fall Risk  Goal: Patient will remain free from falls  Outcome: Progressing   Appropriate fall precautions are in place. Bed alarm is on at all times. Staff present for bathroom needs.   Problem: Pain - Standard  Goal: Alleviation of pain or a reduction in pain to the patient’s comfort goal  Outcome: Progressing   Frequent rounding for pain assessment due to agitation and diaphoresis. Patient denies any pain.     Patient is not progressing towards the following goals:

## 2022-10-16 NOTE — CARE PLAN
The patient is Stable - Low risk of patient condition declining or worsening    Shift Goals  Clinical Goals: Safety/ Neuro checks  Patient Goals: LINDA  Family Goals: LINDA    Progress made toward(s) clinical / shift goals:    Problem: Pain - Standard  Goal: Alleviation of pain or a reduction in pain to the patient’s comfort goal  Outcome: Progressing   0-10 pain scale in place.   Problem: Skin Integrity  Goal: Skin integrity is maintained or improved  Outcome: Progressing   Pt encouraged to get up to chair for meals and d  Problem: Fall Risk  Goal: Patient will remain free from falls  Outcome: Progressing   uring the day.  Fall precautions in place. Pt has call light, bedside table, and belongings within reach.   Patient is not progressing towards the following goals:N/A

## 2022-10-16 NOTE — CARE PLAN
The patient is Stable - Low risk of patient condition declining or worsening    Shift Goals  Clinical Goals: Safety/ mobility  Patient Goals: Sleep  Family Goals: LINDA    Progress made toward(s) clinical / shift goals:    Problem: Skin Integrity  Goal: Skin integrity is maintained or improved  Outcome: Progressing   Pt up to chair for breakfast and lunch. Pt repositioned every two hours and as needed.     Patient is not progressing towards the following goals: N/A

## 2022-10-16 NOTE — PROGRESS NOTES
Bladder scan d/t agitation read 732 mL. SARAHY Agarwal notified and ordered straight cath. Straight cath output resulted ~850 mL.

## 2022-10-16 NOTE — PROGRESS NOTES
ID Brief Note     Patient remains encephalopathic we will baseline despite antibiotics.  Agree with correlation of the tick bite, rash and fevers with his encephalopathy.  However, all infectious disease work-up is negative including for numerous tickborne etiologies.  He has been on multiple antibiotics, including doxycycline without return to baseline mental status. Agree with reviewing the case and appreciate neurology assistance.    --- Patient was initiated on high-dose steroids on 10/15  --- Continues on doxycycline for planned 14-day course  --- Repeat LP on 10/15 with WBC of 1, protein elevated at 84  --- Meningeoencephalitis panel pending  --- Repeat cryptococcal antigen negative  --- West Nile antibodies pending  --- VDRL CSF pending  --- T [place] PCR pending  --- Paraneoplastic antibody pending  --- Autoimmune encephalitis panel pending  --- NMDA antibody pending  --- CSF flow cytometry ordered   --- If no improvement will consider sending out testing for [place] virus -this illness has never been reported in [place] per CDC review so likelihood - but this is a tickborne illness that can cause encephalitis and seizures with long-term sequelae, testing would have to be sent to [facility] lab  --- CMV quant with low-level viremia-suspect this is reactionary and not contributing but as we do not have a clear etiology of this ongoing process and as he has now been started high-dose steroids which will be immune suppressing will plan on treating.  Recommend waiting 48 hours to see results of steroids prior to initiating CMV treatment with valganciclovir.  If CMV is found on the meningeoencephalitis panel will initiate treatment today.     ID will continue to follow the case peripherally. Dr. Richard will be following starting on Monday 10/17.     Genoveva Mccabe MD

## 2022-10-17 LAB
EKG IMPRESSION: NORMAL
GLUCOSE BLD STRIP.AUTO-MCNC: 165 MG/DL (ref 65–99)
GLUCOSE BLD STRIP.AUTO-MCNC: 193 MG/DL (ref 65–99)
GLUCOSE BLD STRIP.AUTO-MCNC: 195 MG/DL (ref 65–99)
GLUCOSE BLD STRIP.AUTO-MCNC: 281 MG/DL (ref 65–99)

## 2022-10-17 PROCEDURE — 99232 SBSQ HOSP IP/OBS MODERATE 35: CPT | Performed by: HOSPITALIST

## 2022-10-17 PROCEDURE — A9270 NON-COVERED ITEM OR SERVICE: HCPCS | Performed by: INTERNAL MEDICINE

## 2022-10-17 PROCEDURE — 99233 SBSQ HOSP IP/OBS HIGH 50: CPT | Performed by: INTERNAL MEDICINE

## 2022-10-17 PROCEDURE — 82962 GLUCOSE BLOOD TEST: CPT | Mod: 91

## 2022-10-17 PROCEDURE — 700102 HCHG RX REV CODE 250 W/ 637 OVERRIDE(OP): Performed by: INTERNAL MEDICINE

## 2022-10-17 PROCEDURE — A9270 NON-COVERED ITEM OR SERVICE: HCPCS | Performed by: STUDENT IN AN ORGANIZED HEALTH CARE EDUCATION/TRAINING PROGRAM

## 2022-10-17 PROCEDURE — 700102 HCHG RX REV CODE 250 W/ 637 OVERRIDE(OP): Performed by: HOSPITALIST

## 2022-10-17 PROCEDURE — 93010 ELECTROCARDIOGRAM REPORT: CPT | Performed by: INTERNAL MEDICINE

## 2022-10-17 PROCEDURE — 36415 COLL VENOUS BLD VENIPUNCTURE: CPT

## 2022-10-17 PROCEDURE — 770020 HCHG ROOM/CARE - TELE (206)

## 2022-10-17 PROCEDURE — 700111 HCHG RX REV CODE 636 W/ 250 OVERRIDE (IP): Performed by: HOSPITALIST

## 2022-10-17 PROCEDURE — 700105 HCHG RX REV CODE 258: Performed by: HOSPITALIST

## 2022-10-17 PROCEDURE — A9270 NON-COVERED ITEM OR SERVICE: HCPCS | Performed by: HOSPITALIST

## 2022-10-17 PROCEDURE — 94760 N-INVAS EAR/PLS OXIMETRY 1: CPT

## 2022-10-17 PROCEDURE — 700102 HCHG RX REV CODE 250 W/ 637 OVERRIDE(OP): Performed by: STUDENT IN AN ORGANIZED HEALTH CARE EDUCATION/TRAINING PROGRAM

## 2022-10-17 RX ADMIN — DOXYCYCLINE 100 MG: 100 TABLET, FILM COATED ORAL at 05:33

## 2022-10-17 RX ADMIN — OMEPRAZOLE 20 MG: 20 CAPSULE, DELAYED RELEASE ORAL at 05:32

## 2022-10-17 RX ADMIN — METOPROLOL SUCCINATE 25 MG: 25 TABLET, FILM COATED, EXTENDED RELEASE ORAL at 05:33

## 2022-10-17 RX ADMIN — RIVAROXABAN 10 MG: 10 TABLET, FILM COATED ORAL at 16:47

## 2022-10-17 RX ADMIN — INSULIN HUMAN 2 UNITS: 100 INJECTION, SOLUTION PARENTERAL at 16:46

## 2022-10-17 RX ADMIN — AMLODIPINE BESYLATE 5 MG: 5 TABLET ORAL at 05:33

## 2022-10-17 RX ADMIN — SENNOSIDES AND DOCUSATE SODIUM 2 TABLET: 50; 8.6 TABLET ORAL at 16:47

## 2022-10-17 RX ADMIN — SODIUM CHLORIDE 1000 MG: 9 INJECTION, SOLUTION INTRAVENOUS at 05:39

## 2022-10-17 RX ADMIN — ASPIRIN 81 MG 81 MG: 81 TABLET ORAL at 05:33

## 2022-10-17 RX ADMIN — INSULIN HUMAN 2 UNITS: 100 INJECTION, SOLUTION PARENTERAL at 20:50

## 2022-10-17 RX ADMIN — SENNOSIDES AND DOCUSATE SODIUM 2 TABLET: 50; 8.6 TABLET ORAL at 05:33

## 2022-10-17 RX ADMIN — INSULIN HUMAN 5 UNITS: 100 INJECTION, SOLUTION PARENTERAL at 12:24

## 2022-10-17 RX ADMIN — DOXYCYCLINE 100 MG: 100 TABLET, FILM COATED ORAL at 16:48

## 2022-10-17 RX ADMIN — INSULIN HUMAN 2 UNITS: 100 INJECTION, SOLUTION PARENTERAL at 08:41

## 2022-10-17 ASSESSMENT — ENCOUNTER SYMPTOMS
CONSTIPATION: 0
DIARRHEA: 0
SHORTNESS OF BREATH: 0
NAUSEA: 0
MYALGIAS: 0
COUGH: 0
HEADACHES: 0
ABDOMINAL PAIN: 0
FOCAL WEAKNESS: 0
NERVOUS/ANXIOUS: 0
DIZZINESS: 0
VOMITING: 0

## 2022-10-17 ASSESSMENT — PAIN DESCRIPTION - PAIN TYPE
TYPE: ACUTE PAIN
TYPE: ACUTE PAIN

## 2022-10-17 NOTE — CARE PLAN
The patient is Stable - Low risk of patient condition declining or worsening    Shift Goals  Clinical Goals: Safety/mobility  Patient Goals: keep getting better  Family Goals: sal- not present    Progress made toward(s) clinical / shift goals:    Problem: Skin Integrity  Goal: Skin integrity is maintained or improved  Outcome: Progressing  Pt up to chair for breakfast, encouraged to sit in chair for lunch and dinner. Pt ambulated 500 ft.     Problem: Fall Risk  Goal: Patient will remain free from falls  Outcome: Progressing   Fall precautions in place. Call light, bedside table, and belongings within reach.        Patient is not progressing towards the following goals:N/A

## 2022-10-17 NOTE — PREADMISSION SCREENING NOTE
Updated Pre-Screen Assessment     Name: Josh Trivedi  MRN: [id number]  : 1965    Medical Status/ Changes:     Dr. Richard (Infectious Disease) progress note 10-18-22:  Assessment:  Active Hospital Problems    Diagnosis     *Seizure, possible meningitis/h/o tick bite, hypoxia/par Afib/anticoagulation (held) (HCC) [R56.9]     GERD (gastroesophageal reflux disease) [K21.9]     Altered mental status [R41.82]     Pleural effusion, bilateral [J90]     Elevated liver enzymes [R74.8]     Acute respiratory failure with hypoxia (HCC) [J96.01]     Paroxysmal A-fib ([facility]) [I48.0]     Tick bite of left lower leg [S80.862A, W57.XXXA]       Assessment:  Josh Trivedi is a 57 y.o. male with past medical history significant for paroxysmal A. fib on Xarelto, obesity, ALEXANDER and no history of epilepsy admitted 10/4/2022 from [facility] ER for dyspnea, rash, and history of tick bite.  He is initially quite encephalopathic and required intubation.  Per notes he had been in [place] approximately 2 weeks prior to abdomen and noticed a tick on his ankle which was immediately removed.  He reports a petechial rash as well as headaches and neck pain.  Rash had improved.  He presented to [facility] and was admitted.  During his care he had a witnessed tonic-clonic seizure and was intubated.  Per report LP was done at [facility] with mildly elevated protein at 57, glucose at 70, no report of cell count.  normal WBC normal glucose but elevated protein.  He has had persistent elevated liver enzymes.  He had increased on ceftriaxone so the concern was that this potentially was contributing.  Ceftriaxone was stopped and they have improved but are still elevated.      Problems:     Leukocytosis, resolved  Encephalopathy, ongoing, significantly improved day-Concern for encephalitis, however we were able to confirm the WBC from LP done at outside facility of 3 which is not within the range  that would be suspected for bacterial or viral encephalitis.  There was some mildly elevated protein per report (57) and West Nile would be a consideration but this was not sent  Tick bite  Suspected RMSF, due to tick bite and rash, however antibodies are negative  -CSF cultures drawn at [facility] are no growth  -Serum crag-negative  -Lyme serology-pending  -Rickettsia Rickettsia high serology-neg   -Cocci serology-pending  -Aspergillus galactomannan-negative   -Fungitell-intermediate  -Urine Legionella and Streptococcus antigen-negative  -HIV negative  -CSF Borrelia burgdorferi reflex panel PCR - negative   --Respiratory viral panel negative  --Syphilis screen negative  --CMV quant pending  Transaminitis, improving   Reported seizure  Paroxysmal A. Fib      Plan:  --- Continue doxycycline 100 mg BID for a 14 day course (end 10/21/22)   -Repeat MRI of the brain on 10/12-unremarkable  --- Patient was initiated on high-dose steroids on 10/15  --- Continues on doxycycline for planned 14-day course  --- Repeat LP on 10/15 with WBC of 1, protein elevated at 84  --- Meningeoencephalitis panel-negative  --- Repeat cryptococcal antigen negative  --- West Nile antibodies pending  --- VDRL CSF pending  --- T Whipplei PCR pending  --- Paraneoplastic antibody pending  --- Autoimmune encephalitis panel pending  --- NMDA antibody pending  --- CSF flow cytometry ordered   --- CMV quant with low-level viremia on 10/11-suspect this is reactionary and not contributing but as we do not have a clear etiology of this ongoing process and as he has now been started high-dose steroids which will be immune suppressing.  Patient has now had significant improvement in his encephalopathy since being started on steroids on 10/15.  We will hold off on any treatment of CMV viremia at this time.    -Repeat serum CMV quantitative PCR on 10/17-pending.  This is a send out and will take 5 to 7 days to result.  Depending on this result,  consider repeating CMV PCR in 4 weeks     Disposition: [facility] rehab     PICC line: No     Discussed with internal medicine, Dr. Velasquez and wife at bedside.  ID signing off.  Will follow pending infectious disease studies outpatient    Functional Status/ Changes:     No new TX updates.    Reviewer: Raymond Gannon L.P.N.  Date: 10/18/2022  Time: 10:03 AM  Pre-Admission Screening Form    Patient Information:   Name: Josh Trivedi     MRN: [id number]       : 1965      Age: 57 y.o.   Gender: male      Race: Unable to Obtain [10]       Marital Status:  [2]  Family Contact: Jessica Trivedi        Relationship: Spouse [17]  Home Phone:            Cell Phone: [phone]  Advanced Directives: None  Code Status:  FULL  Current Attending Provider: Familia Hickman M.D.  Referring Physician: Dr. Fide Hickman  Physiatrist Consult: Dr. Branch   Referral Date: 10-13-22  Primary Payor Source:  [facility]  Secondary Payor Source:      Medical Information:   Date of Admission to Acute Care Setting:10/4/2022  Room Number: S188/02  Rehabilitation Diagnosis: 0002.1 - Brain Dysfunction: Non-Traumatic  Immunization History   Administered Date(s) Administered    PFIZER PURPLE CAP SARS-COV-2 VACCINATION (12+) 2020, 2021     Allergies   Allergen Reactions    Penicillins Rash     Rash as a child; tolerated ampicillin & ceftriaxone Oct 2022     No past medical history on file.  No past surgical history on file.    History Leading to Admission, Conditions that Caused the Need for Rehab (CMS):     Dr. Staples H&P:  Chief Complaint:  Direct admit  1-2 week history of fevers, dyspnea on exertion, headache     HPI: Josh Trivedi is a 57 y.o. male with PMH of obesity, ALEXANDER, and paroxysmal Afib on xarelto and afib who initially presented to [facility] on 10/2 for increasing dyspnea on exertion, fevers and chills, bloating, and headache. Patient presents as a direct admit to [facility] for higher level of care and ID  consult. At time of admission, patient is intubated and sedated due to seizure witnessed on 10/4/2022, altered mental status, and hypoxia; all history comes from chart review and OSH documentation.     Per outside records, symptoms started approximately 2 weeks prior to initial admission where patient was bitten by tick on medial aspect of left lower extremity which was followed by petechial rash of bilateral lower extremities and associated fevers and headaches. Presented to [place] urgent care where he lives; apparently diagnosed with prostatitis, given antibiotics, took them, but did not improve. Presented to ED on 9/30 for fevers, chills, DIAMOND, cough, and headache; had cardiopulmonary work up which was negative, and patient discharged. Presented again for evaluation on 10/2 which lead to current hospitalization.     At [facility], patient was worked up for concern of tickborne illness including Lyme disease, Dante Mountain spotted fever, and meningitis. LP obtained and CSF has cultures, protein (elevated at 57), cell count, and glucose (normal at 78). Lyme disease ordered for serum, but not for CSF. HIV negative, Covid/Flu negative, UDS positive for barbituate, benzodiazepine, and opiates. Per outside reports, CT head did not demonstrate any acute intracranial abnormalities, but had old lacunar infarcts; MRI Brain also ordered and demonstrated same report.   Assessment and Plan:     Josh Trivedi is a 57 y.o. male with PMH of obesity, ALEXANDER, and paroxysmal Afib on xarelto and afib who initially presented to [facility] on 10/2 for increasing dyspnea on exertion, fevers and chills, bloating, and headache. Patient presents as a direct admit to [facility] for ICU level care after witnessed seizure on 10/4/2022 and ID consult for concern of Lyme/RMSF/Meningitis. ID consulted.     * Seizure (HCC)- (present on admission)  Assessment & Plan  One episode of witnessed tonic-clonic seizure at [facility] on 10/4;  prompted intubation and sedation.  -continue intubation/sedation  -consider ativan pushes or midazolam drip for break through seizure  -EEG order placed; neurology consulted per order  -goal SpO2 >90%  -repeat infectious work up (HIV, Covid/Flu, Fungal culture, MRSA, UA)  -lactic acid q4h  -ABG  -follow up CT head     Tick bite of left lower leg  Assessment & Plan  Initially presented to OSH as A&O x4, over course of hospitalization, patient became delirious, A&O x0; one episode of witnessed seizure on 10/4, now intubated and sedated. History of tick bite approximately 2 weeks ago with accompanying petechial rash and headaches/subjective fevers since then. Currently awaiting further LP studies and lyme serology.  -telemetry  -ID consulted, appreciate recs  -continue ampicillin, ceftriaxone, doxycycline and vancomycin to cover for Lyme/RMSF/meningitis  -Further infectious cause work up (HIV, Covid/Flu, Fungal cultures, MRSA, UA)        Acute respiratory failure with hypoxia (HCC)  Assessment & Plan  Patient initially requiring nasal cannula at 2-3L, then increased to HFNC, to BIPAP and then to being intubated and sedated and transferred to [facility]. Chest XR demonstrates   -continue intubation/sedation  -Goal SpO2 > 90%  -RT/O2 protocol  -ABCDEF bundle  -SAT/SBT as tolerated  -diuresis as appropriate  -lactic acid q4h, ABG ordered     Paroxysmal A-fib (HCC)  Assessment & Plan  History of paroxysmal afib on metoprolol and xarelto.  -telemetry  -restart metoprolol as clinically appropriate  -hold xarelto, potentially may need repeat LP  -DVT prophylaxis    Dr. Levi (Infectious Disease) recommendations:  IMPRESSION:   1. 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 male with past medical history significant for paroxysmal A. fib on Xarelto, obesity, ALEXANDER and no history of epilepsy admitted on 10/4/2022 from [facility] ER for dyspnea, rash, and  history of tick bite.  Bilateral rash appears to be clearing former particular rash.  Given history of tick bite and epidemiology of rickettsial species, high suspicion at this moment for Dante [place] spotted fever.  Rickettsial species have been previously associated with encephalopathy, seizure, and respiratory failure, which matches the patient's presentation.  Limited lab data available from home multiple ER to confirm this.  Rickettsia and other tickborne illnesses are obligate intracellular pathogens and require specialized cultures to properly propagate, which are not available at [facility].  Lab reports specialized intracellular stains for Rickettsia and other tickborne illnesses also not available.  RMSF antibody from CSF is pending, and Dr. Ruben Staples is working with referring provider Dr. Arsh Mattson to add additional Rickettsia, Borrelia, encephalitis and meningitis PCR panels.  Without definitive data, we will continue to need to cover empirically for common bacterial meningitis given history of neck stiffness and headaches.    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  Okay to discontinue ampicillin and vancomycin.  - Continue doxycycline for empiric treatment of tickborne illness  - Continue ceftriaxone for empiric treatment of bacterial meningitis  - Consider using dermatoscope or other bedside microscopy to rule out retained head or mouth parts at site of tick bite on medial aspect of left ankle.  - Follow-up Rickettsia, Borrelia, encephalitis and meningitis PCR panels; lab to lab discussion happening for possible transfer of remaining CSF fluid to [facility].  -Consider repeat LP given the urgency of understanding the source of the patient's current critical illness.  - Consider CT chest for further characterization of bilateral pulmonary opacities seen on chest x-ray.     Dr. Branch  (Physiatry) recommendations:  ASSESSMENT:  Patient is a 57 y.o. male admitted with altered mental status and disorientation, now with increasing tremors and decreasing cognitive ability.  Etiology unknown.  Currently on doxycycline as a precaution for Lyme, however infectious etiology is not suspected     [facility] Code / Diagnosis to Support: 0002.1 - Brain Dysfunction: Non-Traumatic     Rehabilitation: Impaired ADLs and mobility  Patient is a good candidate for inpatient rehab based on needs for PT, OT, and speech therapy.  Patient will also benefit from family training.  Patient has a good discharge situation which will be home with spouse.      Barriers to transfer include: Insurance authorization, TCCs to verify disposition, medical clearance and bed availability      All cases are subject to administrative review and recommendations may change     Disposition recommendations:  -Patient will benefit from IPR once medically cleared and etiology and treatment plan for his dysfunction is identified  -Plan to return to home in [place] after rehab  -TCC to follow for rehab appropriateness     Medical Complexity:     Altered mental status  -Etiology unknown  -MR brain WNL  -Consult placed to neurology to continue work-up.  Differential is broad and includes encephalitis, possible seizures  -Unlikely to be infectious etiology per ID  -On doxycycline 14-day course prophylactically  -Continue PT OT and SLP while in-house  -Plan for IPR once etiology and treatment plan has been identified  -No role for neuro stimulants at this time  -Bowel and bladder under volitional control     Hypertension  -Lasix  -Metoprolol    Dr. Garcia (Neurology) recommendations:  Assessment and Plan:     57-year-old male presents with month duration of encephalopathy with tremulousness, hyperreflexia, rash, fevers with negative neuroimaging and benign LP at beginning of the disease process.  Per family all started after a tick bite.  From my  perspective this has to be related to the tick bite.  I cannot think of a noninfectious related neurological disease that spontaneously causes a rash, fevers, and altered mental status this acutely.  It is possible the patient has a postinfectious autoimmune disease process.  Which again is related to CNS infection.  The other option is ignoring the fever, rash and the tick bite and just assumed this was just a big coincidence.  But even then the neurological differential is not great with an idiopathic autoimmune encephalitis being the best diagnosis at this time assuming the fever and rash and tick bite not related.       Recommend repeating the lumbar puncture getting a cell count, glucose, protein, IgG, IgG index, viral studies (Lyme, West Nile, crypto, etc.), paraneoplastic panel (serum and CSF), oligoclonal bands, NMDA, cytology, flow cytometric, Whipple PCR     B12 and TSH and heavy metal screen     Consider empirically treating with high-dose steroids Solu-Medrol 1 g daily for 5 doses if the primary service and ID have no objections.        For the EEG findings is nonspecific.  Patient has not had any further seizure activity would not empirically treat given this finding at this time.     Procedure Lumbar Puncture     Date/Time: 10/15/2022 5:44 PM  Performed by: Marco Olson M.D.  Authorized by: Marco Olson M.D.      Consent:     Consent obtained:  Written    Consent given by:  Patient and spouse    Risks discussed:  Bleeding, infection, pain and nerve damage    Alternatives discussed:  No treatment  Universal protocol:     Procedure explained and questions answered to patient or proxy's satisfaction: yes      Relevant documents present and verified: yes      Immediately prior to procedure a time out was called: yes      Site/side marked: yes      Patient identity confirmed:  Verbally with patient and provided demographic data  Pre-procedure details:     Procedure purpose:  Diagnostic    Preparation:  "Patient was prepped and draped in usual sterile fashion    Sedation:     Sedation type:  Anxiolysis  Anesthesia:     Anesthesia method:  Local infiltration    Local anesthetic:  Lidocaine 1% w/o epi  Procedure details:     Lumbar space:  L4-L5 interspace    Patient position:  Sitting    Needle gauge:  20    Needle type:  Spinal needle - Quincke tip    Needle length (in):  3.5    Ultrasound guidance: no      Number of attempts:  2    Fluid appearance:  Cloudy    Tubes of fluid:  4    Total volume (ml):  13  Post-procedure:     Puncture site:  Adhesive bandage applied    Patient tolerance of procedure:  Tolerated with difficulty  Comments:      Started in lateral decubitus position.  Failed at L5-S1.  Reattempt sitting at L4-5.      Co-morbidities:  See PMH  Potential Risk - Complications: Cognitive Impairment, Contractures, Deep Vein Thrombosis, Dysphagia, Incontinence, Malnutrition, Pain, Paralysis, Perceptual Impairment, Pneumonia, Pressure Ulcer, Seizures, and Urinary Tract Infection  Level of Risk: High    Ongoing Medical Management Needed (Medical/Nursing Needs):   Patient Active Problem List    Diagnosis Date Noted    Steroid-induced hyperglycemia 10/16/2022    Elevated blood pressure reading 10/16/2022    Hypokalemia 10/12/2022    GERD (gastroesophageal reflux disease) 10/11/2022    Altered mental status 10/08/2022    Pleural effusion, bilateral 10/08/2022    Elevated liver enzymes 10/08/2022    Possible infectious meningitis 10/04/2022    Paroxysmal A-fib (HCC) 10/04/2022    Tick bite of left lower leg 10/04/2022     Alert with cognitive impairment.    Current Vital Signs:   Temperature: 36.7 °C (98.1 °F) Pulse: (!) 106 (Rn notified ) Respiration: 18 Blood Pressure: 114/81  Weight: (!) 133 kg (293 lb 3.4 oz) Height: 190.5 cm (6' 3\")  Pulse Oximetry: 94 % O2 (LPM): 0      Completed Laboratory Reports:  Recent Labs     10/16/22  0045 10/16/22  0351   WBC  --  9.3   HEMOGLOBIN  --  15.6   HEMATOCRIT  --  49.2 "   PLATELETCT  --  324   SODIUM 140  --    POTASSIUM 4.6  --    BUN 35*  --    CREATININE 1.06  --    GLUCOSE 218*  --      Additional Labs: Not Applicable    Prior Living Situation:   Housing / Facility: 2 Story House  Steps Into Home: 1  Steps In Home: 12  Lives with - Patient's Self Care Capacity: Spouse, Adult Children  Equipment Owned: None    Prior Level of Function / Living Situation:   Physical Therapy: Prior Services: None  Housing / Facility: 2 Story House  Steps Into Home: 1  Steps In Home: 12  Bathroom Set up: Walk In Shower  Equipment Owned: None  Lives with - Patient's Self Care Capacity: Spouse, Adult Children  Bed Mobility: Independent  Transfer Status: Independent  Ambulation: Independent  Distance Ambulation (Feet):  (Community distances)  Assistive Devices Used: None  Stairs: Independent  Current Level of Function:   Gait Level Of Assist: Contact Guard Assist  Assistive Device: Front Wheel Walker  Distance (Feet): 100  Deviation: Bradykinetic, Decreased Heel Strike, Decreased Toe Off (fwd flexed posture)  Level of Assist with Stairs: Unable to Participate (2/2 fatigue with amb)  Weight Bearing Status: No restrictions  Supine to Sit: Standby Assist  Sit to Supine:  (up to chair post)  Scooting: Standby Assist  Rolling: Standby Assist  Skilled Intervention: Verbal Cuing, Facilitation  Comments: HOB slightly elevated, use of rails  Sit to Stand: Standby Assist  Bed, Chair, Wheelchair Transfer: Standby Assist  Toilet Transfers: Contact Guard Assist (Due to limited balance and cues for safe use of FWW)  Transfer Method: Stand Step  Skilled Intervention: Verbal Cuing, Tactile Cuing, Facilitation  Sitting in Chair: up in chair post  Sitting Edge of Bed: <5 min  Standing: <5 min  Occupational Therapy:   Self Feeding: Independent  Grooming / Hygiene: Independent  Bathing: Independent  Dressing: Independent  Toileting: Independent  Medication Management: Independent  Laundry: Independent  Kitchen Mobility:  Independent  Finances: Independent  Home Management: Independent  Shopping: Independent  Prior Level Of Mobility: Independent Without Device in Community  Driving / Transportation: Driving Independent  Prior Services: None  Housing / Facility: 2 Story House  Occupation (Pre-Hospital Vocational): Employed Full Time (Works as a CRNA)  Current Level of Function:   Upper Body Dressing: Minimal Assist  Lower Body Dressing: Minimal Assist  Toileting: Contact Guard Assist (urination/BM in toilet)  Skilled Intervention: Verbal Cuing, Tactile Cuing, Facilitation  Speech Language Pathology:   Problem List: Dysphagia, Cognitive-Language Deficits, Impaired Judgement, Numerical Function Deficit, Executive Function Deficit, Memory Deficit, Expressive Language Deficit, Receptive Language Deficit, Attention Deficit, Verbal Problem Solving Deficits  Rehabilitation Prognosis/Potential: Good  Estimated Length of Stay: 14-21 days    Nursing:      Continent    Scope/Intensity of Services Recommended:  Physical Therapy: 1 hr / day  5 days / week. Therapeutic Interventions Required: Maximize Endurance, Mobility, Strength, and Safety  Occupational Therapy: 1 hr / day 5 days / week. Therapeutic Interventions Required: Maximize Self Care, ADLs, IADLs, and Energy Conservation  Speech & Language Pathology: 1 hr / day 5 days / week. Therapeutic Interventions Required: Maximize Cognition and Safety  Rehabilitation Nursin/7. Therapeutic Interventions Required: Monitor Pain, Skin, Vital Signs, Intake and Output, Labs, Safety, and Family Training  Rehabilitation Physician: 3 - 5 days / week. Therapeutic Interventions Required: Medical Management    He requires 24-hour rehabilitation nursing to manage skin care, nutrition and fluid intake, pain control, safety, medication management, and patient/family goals. In addition, rehabilitation nursing will reiterate and reinforce therapy skills and equipment use, including ADLs, as well as provide  education to the patient and family. Josh Trivedi is willing to participate in and is able to tolerate the proposed plan of care.    Rehabilitation Goals and Plan (Expected frequency & duration of treatment in the IRF):   Return to the Community, Modified Independent Level of Care, and Family Able to Provide 24/7 Assistance  Anticipated Date of Rehabilitation Admission: 10-17-22  Patient/Family oriented IRF level of care/facility/plan: Yes  Patient/Family willing to participate in IRF care/facility/plan: Yes  Patient able to tolerate IRF level of care proposed: Yes  Patient has potential to benefit IRF level of care proposed: Yes  Comments: Not Applicable    Special Needs or Precautions - Medical Necessity:  Safety Concerns/Precautions:  Fall Risk / High Risk for Falls, Balance, Cognition, and Bed / Chair Alarm  Pain Management  IV Site: Peripheral  Current Medications:    Current Facility-Administered Medications Ordered in Epic   Medication Dose Route Frequency Provider Last Rate Last Admin    insulin regular (HumuLIN R,NovoLIN R) injection  2-9 Units Subcutaneous 4X/DAY ACHS Familia Hickman M.D.   3 Units at 10/16/22 2104    And    dextrose 50% (D50W) injection 25 g  25 g Intravenous Q15 MIN PRN Familia Hickman M.D.        aspirin (ASA) chewable tab 81 mg  81 mg Oral DAILY Familia Hickman M.D.   81 mg at 10/17/22 0533    amLODIPine (NORVASC) tablet 5 mg  5 mg Oral Q DAY Familia Hickman M.D.   5 mg at 10/17/22 0533    methylPREDNISolone sod succ (SOLU-MEDROL) 1,000 mg in  mL IVPB  1 g Intravenous DAILY Familia Hickman M.D.   Stopped at 10/17/22 0639    haloperidol lactate (HALDOL) injection 1 mg  1 mg Intravenous Q4HRS PRN Familia Hickman M.D.   1 mg at 10/15/22 6019    omeprazole (PRILOSEC) capsule 20 mg  20 mg Oral DAILY Familia Hickman M.D.   20 mg at 10/17/22 0532    calcium carbonate (Tums) chewable tab 500 mg  500 mg Oral TID PRN Familia Hickman M.D.   500 mg  at 10/12/22 1543    metoprolol SR (TOPROL XL) tablet 25 mg  25 mg Oral Q DAY Familia Hickman M.D.   25 mg at 10/17/22 0533    rivaroxaban (XARELTO) tablet 10 mg  10 mg Oral DAILY AT 1800 Familia Hickman M.D.   10 mg at 10/16/22 1738    doxycycline monohydrate (ADOXA) tablet 100 mg  100 mg Oral Q12HRS Genoveva Mccabe M.D.   100 mg at 10/17/22 0533    senna-docusate (PERICOLACE or SENOKOT S) 8.6-50 MG per tablet 2 Tablet  2 Tablet Oral BID Jennifer Pineda M.D.   2 Tablet at 10/17/22 0533    And    polyethylene glycol/lytes (MIRALAX) PACKET 1 Packet  1 Packet Oral QDAY SHIRINN Jennifer Pineda M.D.   1 Packet at 10/15/22 0445    And    magnesium hydroxide (MILK OF MAGNESIA) suspension 30 mL  30 mL Oral QDAY PRN Jennifer Pineda M.D.   30 mL at 10/10/22 1109    And    bisacodyl (DULCOLAX) suppository 10 mg  10 mg Rectal QDAY PRN Jennifer Pineda M.D.        acetaminophen (Tylenol) tablet 650 mg  650 mg Oral Q4HRS PRN Jennifer Pineda M.D.   650 mg at 10/15/22 0909    ondansetron (ZOFRAN) syringe/vial injection 4 mg  4 mg Intravenous Q6HRS PRN Ruben Staples M.D.   4 mg at 10/12/22 1523     No current [facility]-ordered outpatient medications on file.     Diet:   DIET ORDERS (From admission to next 24h)       Start     Ordered    10/13/22 1440  Supplements  ALL MEALS        Question:  Which Supplement  Answer:  Per RD    10/13/22 1439    10/11/22 1121  Diet Order Diet: Regular; Tray Modifications (optional): No Straws  START AT DINNER        Question Answer Comment   Diet: Regular    Tray Modifications (optional) No Straws        10/11/22 1120                    Anticipated Discharge Destination / Patient/Family Goal:  Destination: Home with Assistance Support System: Spouse  Anticipated home health services: OT and PT  Previously used  service/ provider: Not Applicable  Anticipated DME Needs: [facility] [employer] [facility]  Outpatient Services: OT and PT  Alternative resources to address additional  identified needs:   No future appointments.   Pre-Screen Completed: 10/17/2022 8:21 AM Raymond Gannon L.P.N.

## 2022-10-17 NOTE — CARE PLAN
Problem: Knowledge Deficit - Standard  Goal: Patient and family/care givers will demonstrate understanding of plan of care, disease process/condition, diagnostic tests and medications  Outcome: Progressing     Problem: Skin Integrity  Goal: Skin integrity is maintained or improved  Outcome: Progressing     Problem: Fall Risk  Goal: Patient will remain free from falls  Outcome: Progressing     Problem: Pain - Standard  Goal: Alleviation of pain or a reduction in pain to the patient’s comfort goal  Outcome: Met     Problem: Safety - Medical Restraint  Goal: Remains free of injury from restraints (Restraint for Interference with Medical Device)  Outcome: Met  Goal: Free from restraint(s) (Restraint for Interference with Medical Device)  Outcome: Met     The patient is Stable - Low risk of patient condition declining or worsening    Shift Goals  Clinical Goals: safety, comfort, improving neuro exam, HD stability  Patient Goals: keep getting better  Family Goals: sal- not present    Progress made toward(s) clinical / shift goals:  pt remained free of pain. HD stable. Neuro assessment stable. Pt remained free from falls despite continued impulsiveness.     Patient is not progressing towards the following goals: n/a

## 2022-10-17 NOTE — PROGRESS NOTES
Infectious Disease Progress Note    Author: Raiza Richard M.D. Date & Time of service: 10/17/2022  9:41 AM       Chief Complaint:  Follow-up for encephalopathy, concern for encephalitis associated with potential tickborne illness     Interval History:  AF, O2 5 L NC, quite somnolent during my exam and did not respond.  Continued on doxycycline and will stop linezolid as unclear need.  10/17 afebrile, repeat CSF cultures negative to date.  CSF cryptococcal antigen also negative.  Patient is up in the chair eating breakfast.  Family at bedside.  He is answering questions appropriately and per wife at bedside, patient has had significant improvement since steroids were started on Saturday.  Wife states that he is a little quicker to respond to questioning.  Today, patient states that his ears are clogged denies any headache or any sinus issues.    Review of Systems:  Review of Systems   HENT:          Ears plugged   Respiratory:  Negative for cough and shortness of breath.    Gastrointestinal:  Negative for abdominal pain, constipation, diarrhea, nausea and vomiting.   Musculoskeletal:  Negative for myalgias.   Neurological:  Negative for dizziness, focal weakness and headaches.   Psychiatric/Behavioral:  The patient is not nervous/anxious.      Hemodynamics:  Temp (24hrs), Av.6 °C (97.9 °F), Min:36.4 °C (97.5 °F), Max:36.7 °C (98.1 °F)  Temperature: 36.7 °C (98.1 °F), Monitored Temp: 36.7 °C (98.1 °F)  Pulse  Av.2  Min: 56  Max: 145   Blood Pressure: 120/67       Physical Exam:  Physical Exam  Vitals and nursing note reviewed.   Constitutional:       Appearance: Normal appearance.   Eyes:      Extraocular Movements: Extraocular movements intact.      Pupils: Pupils are equal, round, and reactive to light.   Cardiovascular:      Rate and Rhythm: Normal rate.   Pulmonary:      Effort: Pulmonary effort is normal.   Abdominal:      General: Abdomen is flat.   Musculoskeletal:         General: Normal range of  motion.   Skin:     General: Skin is warm and dry.   Neurological:      Mental Status: He is alert.      Comments: Answering questions appropriately  Slightly slow to respond to questioning but overall improved per patient's wife at bedside   Psychiatric:         Mood and Affect: Mood normal.         Behavior: Behavior normal.       Meds:    Current Facility-Administered Medications:     insulin regular **AND** POC blood glucose manual result **AND** NOTIFY MD and PharmD **AND** Administer 20 grams of glucose (approximately 8 ounces of fruit juice) every 15 minutes PRN FSBG less than 70 mg/dL **AND** dextrose bolus    aspirin    amLODIPine    methylPREDNISolone    haloperidol lactate    omeprazole    calcium carbonate    metoprolol SR    rivaroxaban    doxycycline monohydrate    senna-docusate **AND** polyethylene glycol/lytes **AND** magnesium hydroxide **AND** bisacodyl    acetaminophen    ondansetron    Labs:  Recent Labs     10/16/22  0351   WBC 9.3   RBC 5.99   HEMOGLOBIN 15.6   HEMATOCRIT 49.2   MCV 82.1   MCH 26.0*   RDW 43.1   PLATELETCT 324   MPV 9.0   NEUTSPOLYS 63.00   LYMPHOCYTES 34.30   MONOCYTES 1.30   EOSINOPHILS 0.10   BASOPHILS 0.30       Recent Labs     10/16/22  0045   SODIUM 140   POTASSIUM 4.6   CHLORIDE 104   CO2 22   GLUCOSE 218*   BUN 35*       Recent Labs     10/16/22  0045   CREATININE 1.06         Imaging:  CT-HEAD W/O    Result Date: 10/4/2022  10/4/2022 5:23 PM HISTORY/REASON FOR EXAM:  Seizure, new-onset, no history of trauma. TECHNIQUE/EXAM DESCRIPTION AND NUMBER OF VIEWS: CT of the head without contrast. The study was performed on a helical multidetector CT scanner. Contiguous 5 mm axial sections were obtained from the skull base through the vertex. Up to date radiation dose reduction adjustments have been utilized to meet ALARA standards for radiation dose reduction. COMPARISON:  None FINDINGS: CALVARIA:  The calvaria are normal in appearance. BRAIN:  The brain parenchyma is normal  in appearance. VENTRICULAR SYSTEM: Ventricular system is normal in size and position. There is no hemorrhage or evidence for acute infarct. There are no extra-axial fluid collection. Sinuses:  The paranasal sinuses are clear. The mastoid air cells and middle ear are clear. The orbital contents are normal in appearance.     Negative noncontrast CT scan of the head / brain.     DX-CHEST-PORTABLE (1 VIEW)    Result Date: 10/4/2022  10/4/2022 2:02 PM HISTORY/REASON FOR EXAM:  Shortness of Breath. TECHNIQUE/EXAM DESCRIPTION AND NUMBER OF VIEWS: Single portable view of the chest. COMPARISON: None FINDINGS: Endotracheal tube terminates 2.1 cm of the matty. Right IJ catheter tip projects in the SVC. Cardiomediastinal silhouette is normal. Diffuse airspace and probable interstitial opacities bilaterally. Layering small pleural effusions are probably present. No pneumothorax. No acute osseous abnormality.     1.  Satisfactory lines and tubes as detailed. 2.  Diffuse bilateral pulmonary opacities are nonspecific, may be related to extensive pulmonary edema or infection. 3.  There may be layering small pleural effusions. No pneumothorax.    CT-CTA CHEST PULMONARY ARTERY W/ RECONS    Result Date: 10/5/2022  10/5/2022 10:05 PM HISTORY/REASON FOR EXAM:  Fever, dyspnea, tachycardia. Symptoms worsening TECHNIQUE/EXAM DESCRIPTION:  CT angiogram of the chest with contrast. Transaxial MDCT scan of chest post bolus 45 cc Omnipaque 350 IV administration. MIP reconstructed images were created and reviewed. Low dose optimization technique was utilized for this CT exam including automated exposure control and adjustment of the mA and/or kV according to patient size. COMPARISON: None FINDINGS: The visualized portion of the thyroid appear within normal limits.  The trachea and main stem airways are normal in caliber. There are no pathologically enlarged mediastinal lymph nodes. The heart and pericardium appear within normal limits.   The  aorta and its main branch vessels are normal in caliber and configuration.  The pulmonary arteries are well opacified without visualized filling defect. The pulmonary parenchyma demonstrates linear consolidations in the bilateral lung bases. There is moderate bilateral pleural effusion seen. Limited views of the abdomen demonstrate no acute abnormality. 11.6 mm low-density left hepatic lobe lesion is seen. The bony structures are age appropriate.     1.  No pulmonary embolus appreciated. 2.  Moderate bilateral pleural effusions. Associated linear consolidations in the lung bases suggests atelectasis, component of infiltrate not excluded. 3.  Low-density left hepatic lobe lesion, could represent small cyst or hemangioma, otherwise indeterminate. Could be followed up with three-phase CT liver for further characterization.    EC-ECHOCARDIOGRAM COMPLETE W/O CONT    Result Date: 10/8/2022  Transthoracic Echo Report Echocardiography Laboratory CONCLUSIONS No prior study is available for comparison. The left ventricular ejection fraction is visually estimated to be 55%. Unable to estimate right ventricular systolic pressure due to an inadequate tricuspid regurgitant jet. LEANDRA JONES Exam Date:         10/07/2022                    14:26 Exam Location:     Inpatient Priority:          Routine Ordering Physician:        KHRIS MADERA Referring Physician:       [id number] Jensen Sonographer:               Jake ACEVEDO Age:    57     Gender:    M MRN:    [id number] :    1965 BSA:    2.64   Ht (in):    75     Wt (lb):    309 Exam Type:     Complete Indications:     Congestive Heart Failure ICD Codes:       428 CPT Codes:       41755 BP:   86     /   54     HR: Technical Quality:       Fair MEASUREMENTS  (Male / Female) Normal Values 2D ECHO LV Diastolic Diameter PLAX        5.2 cm                4.2 - 5.9 / 3.9 - 5.3 cm LV Systolic Diameter PLAX         3.8 cm                 2.1 - 4.0 cm IVS Diastolic Thickness           1.4 cm                LVPW Diastolic Thickness          1.2 cm                LVOT Diameter                     2 cm                  Estimated LV Ejection Fraction    55 %                  LV Ejection Fraction MOD BP       51.4 %                >= 55  % LV Ejection Fraction MOD 4C       56.2 %                LV Ejection Fraction MOD 2C       44.3 %                DOPPLER AV Peak Velocity                  1.5 m/s               AV Peak Gradient                  9.3 mmHg              AV Mean Gradient                  4.8 mmHg              LVOT Peak Velocity                0.81 m/s              AV Area Cont Eq vti               2.1 cm2               MV Velocity Time Integral         21.7 cm               Mitral E Point Velocity           0.93 m/s              Mitral E to A Ratio               0.98                  Mitral A Duration                 142 ms                MV Pressure Half Time             81.8 ms               MV Area PHT                       2.7 cm2               MV Deceleration Time              242 ms                PV Peak Velocity                  1.1 m/s               PV Peak Gradient                  5.3 mmHg              RVOT Peak Velocity                0.96 m/s              * Indicates values subject to auto-interpretation LV EF:  55    % FINDINGS Left Ventricle Normal left ventricular chamber size. Mild concentric left ventricular hypertrophy. Normal left ventricular systolic function. The left ventricular ejection fraction is visually estimated to be 55%. Normal regional wall motion. Indeterminate diastolic function. Right Ventricle The right ventricle is normal in size and systolic function. Right Atrium The right atrium is normal in size. Normal inferior vena cava size and inspiratory collapse. Left Atrium The left atrium is normal in size. Left atrial volume index is 17.2 mL/sq m. Mitral Valve Structurally normal mitral valve without  significant stenosis or regurgitation. Aortic Valve Structurally normal aortic valve without significant stenosis or regurgitation. Tricuspid Valve Structurally normal tricuspid valve without significant stenosis. Trace tricuspid regurgitation. Unable to estimate right ventricular systolic pressure due to an inadequate tricuspid regurgitant jet. Pulmonic Valve Structurally normal pulmonic valve without significant stenosis or regurgitation. Pericardium Normal pericardium without effusion. Aorta Normal aortic root for body surface area. The ascending aorta diameter is 3.2 cm. Cam Horvath MD (Electronically Signed) Final Date:     08 October 2022                 06:44    DX-ABDOMEN FOR TUBE PLACEMENT    Result Date: 10/5/2022  10/5/2022 4:54 PM HISTORY/REASON FOR EXAM:  Line evaluation. TECHNIQUE/EXAM DESCRIPTION AND NUMBER OF VIEWS:  1 view(s) of the abdomen. COMPARISON:  None. FINDINGS: Enteric tube has been placed. The tip projects over the left upper abdomen. The bowel gas pattern is within normal limits. Limited imaging of the chest demonstrates satisfactory appearance of an ET tube and right vascular catheter. There are diffuse bilateral parenchymal hazy opacities.     1.  Enteric tube overlies the proximal stomach.      Micro:  Results       Procedure Component Value Units Date/Time    CSF CULTURE [[id number]] Collected: 10/15/22 1750    Order Status: Completed Specimen: CSF Updated: 10/16/22 1507     Significant Indicator NEG     Source CSF     Site Tap     Culture Result No growth at 24 hours.     Gram Stain Result Rare WBCs.  No organisms seen.      Cryptococcal Antigen, CSF [[id number]] Collected: 10/15/22 1750    Order Status: Completed Updated: 10/15/22 2029     Cryptococcal Antigen, CSF Negative    GRAM STAIN [[id number]] Collected: 10/15/22 1750    Order Status: Completed Specimen: CSF Updated: 10/15/22 1926     Significant Indicator .     Source CSF     Site Tap     Gram Stain Result Rare  WBCs.  No organisms seen.      CSF CULTURE [[id number]]     Order Status: No result Specimen: CSF from Tap     Cryptococcal Antigen, CSF [[id number]]     Order Status: No result             Assessment:  Active Hospital Problems    Diagnosis     *Seizure, possible meningitis/h/o tick bite, hypoxia/par Afib/anticoagulation (held) (HCC) [R56.9]     GERD (gastroesophageal reflux disease) [K21.9]     Altered mental status [R41.82]     Pleural effusion, bilateral [J90]     Elevated liver enzymes [R74.8]     Acute respiratory failure with hypoxia (HCC) [J96.01]     Paroxysmal A-fib ([facility]) [I48.0]     Tick bite of left lower leg [S80.862A, W57.XXXA]      Assessment:  Josh Trivedi is a 57 y.o. male with past medical history significant for paroxysmal A. fib on Xarelto, obesity, ALEXANDER and no history of epilepsy admitted 10/4/2022 from [facility] ER for dyspnea, rash, and history of tick bite.  He is initially quite encephalopathic and required intubation.  Per notes he had been in [place] approximately 2 weeks prior to abdomen and noticed a tick on his ankle which was immediately removed.  He reports a petechial rash as well as headaches and neck pain.  Rash had improved.  He presented to [facility] and was admitted.  During his care he had a witnessed tonic-clonic seizure and was intubated.  Per report LP was done at [facility] with mildly elevated protein at 57, glucose at 70, no report of cell count.  normal WBC normal glucose but elevated protein.  He has had persistent elevated liver enzymes.  He had increased on ceftriaxone so the concern was that this potentially was contributing.  Ceftriaxone was stopped and they have improved but are still elevated.      Problems:     Leukocytosis, resolved  Encephalopathy, ongoing, significantly improved day-Concern for encephalitis, however we were able to confirm the WBC from LP done at outside facility of 3 which is not within  the range that would be suspected for bacterial or viral encephalitis.  There was some mildly elevated protein per report (57) and West Nile would be a consideration but this was not sent  Tick bite  Suspected RMSF, due to tick bite and rash, however antibodies are negative  -CSF cultures drawn at [facility] are no growth  -Serum crag-negative  -Lyme serology-pending  -Rickettsia Rickettsia high serology-neg   -Cocci serology-pending  -Aspergillus galactomannan-negative   -Fungitell-intermediate  -Urine Legionella and Streptococcus antigen-negative  -HIV negative  -CSF Borrelia burgdorferi reflex panel PCR - negative   --Respiratory viral panel negative  --Syphilis screen negative  --CMV quant pending  Transaminitis, improving   Reported seizure  Paroxysmal A. Fib      Plan:  --- Continue doxycycline 100 mg BID for a 14 day course (end 10/21/22)   -Repeat MRI of the brain on 10/12-unremarkable  --- Patient was initiated on high-dose steroids on 10/15  --- Continues on doxycycline for planned 14-day course  --- Repeat LP on 10/15 with WBC of 1, protein elevated at 84  --- Meningeoencephalitis panel-negative  --- Repeat cryptococcal antigen negative  --- West Nile antibodies pending  --- VDRL CSF pending  --- T [place] PCR pending  --- Paraneoplastic antibody pending  --- Autoimmune encephalitis panel pending  --- NMDA antibody pending  --- CSF flow cytometry ordered   --- CMV quant with low-level viremia on 10/11-suspect this is reactionary and not contributing but as we do not have a clear etiology of this ongoing process and as he has now been started high-dose steroids which will be immune suppressing.  Patient has now had significant improvement in his encephalopathy since being started on steroids on 10/15.  We will hold off on any treatment of CMV viremia at this time.    -Repeat serum CMV quantitative PCR-ordered today     Discussed with internal medicine, Dr. Fide Hickman and family at bedside. ID will  follow.

## 2022-10-17 NOTE — DISCHARGE PLANNING
Dr. Branch is recommending [facility] pending DX etiology/POC.  Spoke with Jessica, spouse regarding [facility] & D/C resources/support.  She is agreeable with an admission.  They live in a 2LV home with 1ST to enter.  Josh will remain on the entry level.  Jessica does not work as she cares for their son.  She is staying in [place] and will participate with family training and will drive Josh home. Submitted to insurance.     1325-Insurance has authorized.

## 2022-10-17 NOTE — PROGRESS NOTES
Hospital Medicine Daily Progress Note    Date of Service  10/17/2022    Chief Complaint  Josh Trivedi is a 57 y.o. male admitted 10/4/2022 with No chief complaint on file.      Hospital Course  No notes on file  Josh Trivedi is a 57 y.o. male with a history of obesity, sleep apnea, paroxysmal atrial fibrillation on Xarelto who presented 10/4/2022 with transfer from [facility] on 10/2 for worsening shortness of breath fevers and headache.  Upon admission he had been intubated and sedated due to witnessed seizure on 10/4 with altered mental status and hypoxia.     Patient is from [place] and approximately 2 weeks prior to admission had been bitten by a tick on the left lower extremity.  He had a rash of his bilateral legs associated with fever and headaches.  He went to an urgent care and was diagnosed with prostatitis and given antibiotics but he did not improve.  He went to the emergency room on 9/30 for fevers chills shortness of breath and headache.  Patient was discharged but presented again on 10/2 which led to current hospitalization.  At outside hospital he was worked up for potential tickborne illness including Lyme disease, [place] spotted fever, and meningitis.  Lumbar puncture obtained and CSF cultures, and protein (elevated 57), cell count and normal CSF glucose.  He has had elevated liver enzymes.  Patient was negative for COVID, HIV.  Infectious diseases been consulting and has concern of [place] spotted fever.      Interval Problem Update    Alert oriented x3 this morning per wife  More somnolent this afternoon  Afebrile  Discussed with ID recommendation is to hold off on treatment of CMV viremia        I have discussed this patient's plan of care and discharge plan at IDT rounds today with Case Management, Nursing, Nursing leadership, and other members of the IDT team.    Consultants/Specialty  Intensivist admitted  Hospitalist  ID      Code Status  Full  Code    Disposition  Patient is not medically cleared for discharge.   Anticipate discharge to  TBD .  I have placed the appropriate orders for post-discharge needs.    Review of Systems  Review of Systems   Unable to perform ROS: Medical condition      Physical Exam  Temp:  [36.4 °C (97.5 °F)-36.7 °C (98.1 °F)] 36.7 °C (98.1 °F)  Pulse:  [] 86  Resp:  [16-19] 18  BP: (114-126)/(66-98) 126/66  SpO2:  [89 %-98 %] 95 %    Physical Exam  Vitals and nursing note reviewed.   Constitutional:       Appearance: He is well-developed. He is not diaphoretic.      Comments: Asleep arousable   HENT:      Head: Normocephalic and atraumatic.      Mouth/Throat:      Pharynx: No oropharyngeal exudate.   Eyes:      General: No scleral icterus.        Right eye: No discharge.         Left eye: No discharge.      Conjunctiva/sclera: Conjunctivae normal.      Pupils: Pupils are equal, round, and reactive to light.   Neck:      Vascular: No JVD.      Trachea: No tracheal deviation.   Cardiovascular:      Rate and Rhythm: Normal rate and regular rhythm.      Heart sounds: No murmur heard.    No friction rub. No gallop.   Pulmonary:      Effort: Pulmonary effort is normal. No respiratory distress.      Breath sounds: Normal breath sounds. No stridor. No wheezing.   Chest:      Chest wall: No tenderness.   Abdominal:      General: Bowel sounds are normal. There is no distension.      Palpations: Abdomen is soft.      Tenderness: There is no abdominal tenderness. There is no rebound.   Musculoskeletal:         General: No tenderness.      Cervical back: Neck supple.   Skin:     General: Skin is warm and dry.      Nails: There is no clubbing.   Neurological:      Cranial Nerves: No cranial nerve deficit.      Motor: No abnormal muscle tone.      Comments: Patient is asleep arousable no focal deficits slow to respond   Psychiatric:         Behavior: Behavior is slowed.       Fluids    Intake/Output Summary (Last 24 hours) at 10/17/2022  1554  Last data filed at 10/17/2022 0800  Gross per 24 hour   Intake 560 ml   Output --   Net 560 ml         Laboratory  Recent Labs     10/16/22  0351   WBC 9.3   RBC 5.99   HEMOGLOBIN 15.6   HEMATOCRIT 49.2   MCV 82.1   MCH 26.0*   MCHC 31.7*   RDW 43.1   PLATELETCT 324   MPV 9.0       Recent Labs     10/16/22  0045   SODIUM 140   POTASSIUM 4.6   CHLORIDE 104   CO2 22   GLUCOSE 218*   BUN 35*   CREATININE 1.06   CALCIUM 9.4                         Imaging  MR-BRAIN-WITH & W/O   Final Result      1.  No acute abnormality.   2.  Mild cerebral volume loss.   3.  There is no hippocampal sclerosis, neuronal migrational abnormality or cortical dysplasia.      EC-ECHOCARDIOGRAM COMPLETE W/O CONT   Final Result      CT-CTA CHEST PULMONARY ARTERY W/ RECONS   Final Result         1.  No pulmonary embolus appreciated.   2.  Moderate bilateral pleural effusions. Associated linear consolidations in the lung bases suggests atelectasis, component of infiltrate not excluded.   3.  Low-density left hepatic lobe lesion, could represent small cyst or hemangioma, otherwise indeterminate. Could be followed up with three-phase CT liver for further characterization.      DX-ABDOMEN FOR TUBE PLACEMENT   Final Result      1.  Enteric tube overlies the proximal stomach.      CT-HEAD W/O   Final Result      Negative noncontrast CT scan of the head / brain.         DX-CHEST-PORTABLE (1 VIEW)   Final Result      1.  Satisfactory lines and tubes as detailed.   2.  Diffuse bilateral pulmonary opacities are nonspecific, may be related to extensive pulmonary edema or infection.   3.  There may be layering small pleural effusions. No pneumothorax.             Assessment/Plan  * Possible infectious meningitis- (present on admission)  Assessment & Plan  Initial concern of potential CSF infection     Evaluated by ID with recommendations to complete 2 weeks course of doxycycline given recent tick bite with rash    MRI brain normal  Formal neurology  consult on 10/15/2022 with recommendation to repeat LP and initiate high-dose Solu-Medrol 1 g daily for 5 days    Discussed with ID  Follow-up on pending CSF  Overall significant improvement since started IV steroids  If clinically improved tomorrow consider discharge to rehab to complete steroid course        Elevated blood pressure reading  Assessment & Plan  Likely secondary to steroids    Continue metoprolol add amlodipine and monitor    Steroid-induced hyperglycemia  Assessment & Plan  Insulin sliding scale monitor CBGs    GERD (gastroesophageal reflux disease)  Assessment & Plan  Prilosec and Tums as needed    Elevated liver enzymes  Assessment & Plan  Abdominal exam is benign  LFTs trended down  Ammonia normal    Altered mental status  Assessment & Plan   acute encephalopathy    Empirically treated with antibiotics given recent tick infection  Discussed with ID LP done at outside facility not consistent with meningitis  MRI with and without contrast negative  Appreciate neurology input follow-up on repeat LP results  Neurology recommending empiric treatment with Solu-Medrol 1 g daily for 5 days  Continue to monitor and avoid sedating agents    Tick bite of left lower leg  Assessment & Plan  Lower extremity rash resolved    Serologies were negative  Lyme PCR negative    ID recommended completing 2 weeks course of doxycycline    Paroxysmal A-fib (HCC)  Assessment & Plan  CHADSVasc score:1  Holding anticoagulation for now.    Low-dose aspirin       VTE prophylaxis: Xarelto 10 mg daily as prophylaxis    I have performed a physical exam and reviewed and updated ROS and Plan today (10/17/2022). In review of yesterday's note (10/16/2022), there are no changes except as documented above.

## 2022-10-18 LAB
ALB CSF/SERPL: 18.2 RATIO (ref 0–9)
ALBUMIN CSF-MCNC: 59 MG/DL (ref 0–35)
ALBUMIN SERPL BCP-MCNC: 3.7 G/DL (ref 3.2–4.9)
ALBUMIN SERPL-MCNC: 3247 MG/DL (ref 3500–5200)
ALBUMIN/GLOB SERPL: 1.1 G/DL
ALP SERPL-CCNC: 64 U/L (ref 30–99)
ALT SERPL-CCNC: 75 U/L (ref 2–50)
ANION GAP SERPL CALC-SCNC: 11 MMOL/L (ref 7–16)
AST SERPL-CCNC: 25 U/L (ref 12–45)
BACTERIA CSF CULT: NORMAL
BASOPHILS # BLD AUTO: 0.1 % (ref 0–1.8)
BASOPHILS # BLD: 0.01 K/UL (ref 0–0.12)
BILIRUB SERPL-MCNC: 0.5 MG/DL (ref 0.1–1.5)
BUN SERPL-MCNC: 39 MG/DL (ref 8–22)
CALCIUM SERPL-MCNC: 8.9 MG/DL (ref 8.5–10.5)
CHLORIDE SERPL-SCNC: 102 MMOL/L (ref 96–112)
CO2 SERPL-SCNC: 25 MMOL/L (ref 20–33)
CREAT SERPL-MCNC: 0.93 MG/DL (ref 0.5–1.4)
EOSINOPHIL # BLD AUTO: 0 K/UL (ref 0–0.51)
EOSINOPHIL NFR BLD: 0 % (ref 0–6.9)
ERYTHROCYTE [DISTWIDTH] IN BLOOD BY AUTOMATED COUNT: 43.1 FL (ref 35.9–50)
GFR SERPLBLD CREATININE-BSD FMLA CKD-EPI: 97 ML/MIN/1.73 M 2
GLOBULIN SER CALC-MCNC: 3.3 G/DL (ref 1.9–3.5)
GLUCOSE BLD STRIP.AUTO-MCNC: 159 MG/DL (ref 65–99)
GLUCOSE BLD STRIP.AUTO-MCNC: 189 MG/DL (ref 65–99)
GLUCOSE BLD STRIP.AUTO-MCNC: 223 MG/DL (ref 65–99)
GLUCOSE BLD STRIP.AUTO-MCNC: 229 MG/DL (ref 65–99)
GLUCOSE SERPL-MCNC: 152 MG/DL (ref 65–99)
GRAM STN SPEC: NORMAL
HCT VFR BLD AUTO: 40.5 % (ref 42–52)
HGB BLD-MCNC: 12.8 G/DL (ref 14–18)
IGG CSF-MCNC: 10.4 MG/DL (ref 0–6)
IGG SERPL-MCNC: 1520 MG/DL (ref 768–1632)
IGG SYNTH RATE SER+CSF CALC-MRATE: <0 MG/D
IGG/ALB CLEAR SER+CSF-RTO: 0.38 RATIO (ref 0.28–0.66)
IGG/ALB CSF: 0.18 RATIO (ref 0.09–0.25)
IMM GRANULOCYTES # BLD AUTO: 0.08 K/UL (ref 0–0.11)
IMM GRANULOCYTES NFR BLD AUTO: 0.7 % (ref 0–0.9)
LYMPHOCYTES # BLD AUTO: 2.42 K/UL (ref 1–4.8)
LYMPHOCYTES NFR BLD: 20.4 % (ref 22–41)
MCH RBC QN AUTO: 25.9 PG (ref 27–33)
MCHC RBC AUTO-ENTMCNC: 31.6 G/DL (ref 33.7–35.3)
MCV RBC AUTO: 81.8 FL (ref 81.4–97.8)
MONOCYTES # BLD AUTO: 0.8 K/UL (ref 0–0.85)
MONOCYTES NFR BLD AUTO: 6.7 % (ref 0–13.4)
NEUTROPHILS # BLD AUTO: 8.58 K/UL (ref 1.82–7.42)
NEUTROPHILS NFR BLD: 72.1 % (ref 44–72)
NRBC # BLD AUTO: 0 K/UL
NRBC BLD-RTO: 0 /100 WBC
PLATELET # BLD AUTO: 315 K/UL (ref 164–446)
PMV BLD AUTO: 9.7 FL (ref 9–12.9)
POTASSIUM SERPL-SCNC: 4.5 MMOL/L (ref 3.6–5.5)
PROT SERPL-MCNC: 7 G/DL (ref 6–8.2)
RBC # BLD AUTO: 4.95 M/UL (ref 4.7–6.1)
SIGNIFICANT IND 70042: NORMAL
SITE SITE: NORMAL
SODIUM SERPL-SCNC: 138 MMOL/L (ref 135–145)
SOURCE SOURCE: NORMAL
WBC # BLD AUTO: 11.9 K/UL (ref 4.8–10.8)
WNV IGG CSF IA-ACNC: 0.27 IV
WNV IGM CSF IA-ACNC: 0 IV

## 2022-10-18 PROCEDURE — 94660 CPAP INITIATION&MGMT: CPT

## 2022-10-18 PROCEDURE — 700111 HCHG RX REV CODE 636 W/ 250 OVERRIDE (IP): Performed by: HOSPITALIST

## 2022-10-18 PROCEDURE — A9270 NON-COVERED ITEM OR SERVICE: HCPCS | Performed by: INTERNAL MEDICINE

## 2022-10-18 PROCEDURE — 36415 COLL VENOUS BLD VENIPUNCTURE: CPT

## 2022-10-18 PROCEDURE — 700102 HCHG RX REV CODE 250 W/ 637 OVERRIDE(OP): Performed by: HOSPITALIST

## 2022-10-18 PROCEDURE — 99232 SBSQ HOSP IP/OBS MODERATE 35: CPT | Performed by: STUDENT IN AN ORGANIZED HEALTH CARE EDUCATION/TRAINING PROGRAM

## 2022-10-18 PROCEDURE — 770020 HCHG ROOM/CARE - TELE (206)

## 2022-10-18 PROCEDURE — 700105 HCHG RX REV CODE 258: Performed by: HOSPITALIST

## 2022-10-18 PROCEDURE — 85025 COMPLETE CBC W/AUTO DIFF WBC: CPT

## 2022-10-18 PROCEDURE — 700102 HCHG RX REV CODE 250 W/ 637 OVERRIDE(OP): Performed by: STUDENT IN AN ORGANIZED HEALTH CARE EDUCATION/TRAINING PROGRAM

## 2022-10-18 PROCEDURE — A9270 NON-COVERED ITEM OR SERVICE: HCPCS | Performed by: STUDENT IN AN ORGANIZED HEALTH CARE EDUCATION/TRAINING PROGRAM

## 2022-10-18 PROCEDURE — 80053 COMPREHEN METABOLIC PANEL: CPT

## 2022-10-18 PROCEDURE — 99232 SBSQ HOSP IP/OBS MODERATE 35: CPT | Performed by: INTERNAL MEDICINE

## 2022-10-18 PROCEDURE — 94760 N-INVAS EAR/PLS OXIMETRY 1: CPT

## 2022-10-18 PROCEDURE — 97116 GAIT TRAINING THERAPY: CPT

## 2022-10-18 PROCEDURE — 82962 GLUCOSE BLOOD TEST: CPT | Mod: 91

## 2022-10-18 PROCEDURE — A9270 NON-COVERED ITEM OR SERVICE: HCPCS | Performed by: HOSPITALIST

## 2022-10-18 PROCEDURE — 700102 HCHG RX REV CODE 250 W/ 637 OVERRIDE(OP): Performed by: INTERNAL MEDICINE

## 2022-10-18 RX ADMIN — AMLODIPINE BESYLATE 5 MG: 5 TABLET ORAL at 04:27

## 2022-10-18 RX ADMIN — ASPIRIN 81 MG 81 MG: 81 TABLET ORAL at 04:27

## 2022-10-18 RX ADMIN — INSULIN HUMAN 2 UNITS: 100 INJECTION, SOLUTION PARENTERAL at 07:44

## 2022-10-18 RX ADMIN — SENNOSIDES AND DOCUSATE SODIUM 2 TABLET: 50; 8.6 TABLET ORAL at 17:24

## 2022-10-18 RX ADMIN — INSULIN HUMAN 3 UNITS: 100 INJECTION, SOLUTION PARENTERAL at 17:28

## 2022-10-18 RX ADMIN — RIVAROXABAN 10 MG: 10 TABLET, FILM COATED ORAL at 17:26

## 2022-10-18 RX ADMIN — METOPROLOL SUCCINATE 25 MG: 25 TABLET, FILM COATED, EXTENDED RELEASE ORAL at 04:26

## 2022-10-18 RX ADMIN — DOXYCYCLINE 100 MG: 100 TABLET, FILM COATED ORAL at 04:27

## 2022-10-18 RX ADMIN — INSULIN HUMAN 2 UNITS: 100 INJECTION, SOLUTION PARENTERAL at 21:30

## 2022-10-18 RX ADMIN — SODIUM CHLORIDE 1000 MG: 9 INJECTION, SOLUTION INTRAVENOUS at 04:35

## 2022-10-18 RX ADMIN — INSULIN HUMAN 3 UNITS: 100 INJECTION, SOLUTION PARENTERAL at 12:31

## 2022-10-18 RX ADMIN — SENNOSIDES AND DOCUSATE SODIUM 2 TABLET: 50; 8.6 TABLET ORAL at 04:27

## 2022-10-18 RX ADMIN — DOXYCYCLINE 100 MG: 100 TABLET, FILM COATED ORAL at 17:25

## 2022-10-18 RX ADMIN — OMEPRAZOLE 20 MG: 20 CAPSULE, DELAYED RELEASE ORAL at 04:26

## 2022-10-18 ASSESSMENT — ENCOUNTER SYMPTOMS
HEADACHES: 0
NERVOUS/ANXIOUS: 0
CONSTIPATION: 0
DIZZINESS: 0
WEAKNESS: 0
SHORTNESS OF BREATH: 0
COUGH: 0
ABDOMINAL PAIN: 0
FEVER: 0
FOCAL WEAKNESS: 0
NAUSEA: 0
PALPITATIONS: 0
DIARRHEA: 0
VOMITING: 0
CHILLS: 0
MYALGIAS: 0

## 2022-10-18 ASSESSMENT — COGNITIVE AND FUNCTIONAL STATUS - GENERAL
MOVING TO AND FROM BED TO CHAIR: A LITTLE
MOVING FROM LYING ON BACK TO SITTING ON SIDE OF FLAT BED: A LITTLE
CLIMB 3 TO 5 STEPS WITH RAILING: A LITTLE
SUGGESTED CMS G CODE MODIFIER MOBILITY: CK
WALKING IN HOSPITAL ROOM: A LITTLE
MOBILITY SCORE: 19
STANDING UP FROM CHAIR USING ARMS: A LITTLE

## 2022-10-18 ASSESSMENT — GAIT ASSESSMENTS
GAIT LEVEL OF ASSIST: SUPERVISED
DISTANCE (FEET): 400

## 2022-10-18 ASSESSMENT — PAIN DESCRIPTION - PAIN TYPE: TYPE: ACUTE PAIN

## 2022-10-18 NOTE — PROGRESS NOTES
Infectious Disease Progress Note    Author: Raiza Richard M.D. Date & Time of service: 10/18/2022  8:20 AM       Chief Complaint:  Follow-up for encephalopathy, concern for encephalitis associated with potential tickborne illness     Interval History:  AF, O2 5 L NC, quite somnolent during my exam and did not respond.  Continued on doxycycline and will stop linezolid as unclear need.  10/17 afebrile, repeat CSF cultures negative to date.  CSF cryptococcal antigen also negative.  Patient is up in the chair eating breakfast.  Family at bedside.  He is answering questions appropriately and per wife at bedside, patient has had significant improvement since steroids were started on Saturday.  Wife states that he is a little quicker to respond to questioning.  Today, patient states that his ears are clogged denies any headache or any sinus issues.  10/18 afebrile, WBC 11.9 multiple autoimmune studies pending.  Patient alert and answering questions appropriately.  Also joking.  There is concern that patient becomes somnolent in the afternoons and had visual hallucinations.  Family members at bedside questions answered    Review of Systems:  Review of Systems   Constitutional:  Negative for chills and fever.   Respiratory:  Negative for cough and shortness of breath.    Gastrointestinal:  Negative for abdominal pain, constipation, diarrhea, nausea and vomiting.   Musculoskeletal:  Negative for myalgias.   Neurological:  Negative for dizziness, focal weakness and headaches.   Psychiatric/Behavioral:  The patient is not nervous/anxious.      Hemodynamics:  Temp (24hrs), Av.6 °C (97.8 °F), Min:36.3 °C (97.3 °F), Max:36.7 °C (98.1 °F)  Temperature: 36.7 °C (98.1 °F), Monitored Temp: 36.6 °C (97.9 °F)  Pulse  Av.1  Min: 56  Max: 145   Blood Pressure: 122/67       Physical Exam:  Physical Exam  Vitals and nursing note reviewed.   Constitutional:       Appearance: Normal appearance.   Eyes:      Extraocular  Movements: Extraocular movements intact.      Pupils: Pupils are equal, round, and reactive to light.   Cardiovascular:      Rate and Rhythm: Normal rate.   Pulmonary:      Effort: Pulmonary effort is normal.   Abdominal:      General: Abdomen is flat.   Musculoskeletal:         General: Normal range of motion.   Skin:     General: Skin is warm and dry.   Neurological:      Mental Status: He is alert and oriented to person, place, and time.      Comments: Answering questions appropriately  Mental status overall improved  Making jokes   Psychiatric:         Mood and Affect: Mood normal.         Behavior: Behavior normal.       Meds:    Current Facility-Administered Medications:     insulin regular **AND** POC blood glucose manual result **AND** NOTIFY MD and PharmD **AND** Administer 20 grams of glucose (approximately 8 ounces of fruit juice) every 15 minutes PRN FSBG less than 70 mg/dL **AND** dextrose bolus    aspirin    amLODIPine    methylPREDNISolone    haloperidol lactate    omeprazole    calcium carbonate    metoprolol SR    rivaroxaban    doxycycline monohydrate    senna-docusate **AND** polyethylene glycol/lytes **AND** magnesium hydroxide **AND** bisacodyl    acetaminophen    ondansetron    Labs:  Recent Labs     10/16/22  0351 10/18/22  0144   WBC 9.3 11.9*   RBC 5.99 4.95   HEMOGLOBIN 15.6 12.8*   HEMATOCRIT 49.2 40.5*   MCV 82.1 81.8   MCH 26.0* 25.9*   RDW 43.1 43.1   PLATELETCT 324 315   MPV 9.0 9.7   NEUTSPOLYS 63.00 72.10*   LYMPHOCYTES 34.30 20.40*   MONOCYTES 1.30 6.70   EOSINOPHILS 0.10 0.00   BASOPHILS 0.30 0.10       Recent Labs     10/16/22  0045 10/18/22  0144   SODIUM 140 138   POTASSIUM 4.6 4.5   CHLORIDE 104 102   CO2 22 25   GLUCOSE 218* 152*   BUN 35* 39*       Recent Labs     10/16/22  0045 10/18/22  0144   ALBUMIN  --  3.7   TBILIRUBIN  --  0.5   ALKPHOSPHAT  --  64   TOTPROTEIN  --  7.0   ALTSGPT  --  75*   ASTSGOT  --  25   CREATININE 1.06 0.93         Imaging:  CT-HEAD W/O    Result  Date: 10/4/2022  10/4/2022 5:23 PM HISTORY/REASON FOR EXAM:  Seizure, new-onset, no history of trauma. TECHNIQUE/EXAM DESCRIPTION AND NUMBER OF VIEWS: CT of the head without contrast. The study was performed on a helical multidetector CT scanner. Contiguous 5 mm axial sections were obtained from the skull base through the vertex. Up to date radiation dose reduction adjustments have been utilized to meet ALARA standards for radiation dose reduction. COMPARISON:  None FINDINGS: CALVARIA:  The calvaria are normal in appearance. BRAIN:  The brain parenchyma is normal in appearance. VENTRICULAR SYSTEM: Ventricular system is normal in size and position. There is no hemorrhage or evidence for acute infarct. There are no extra-axial fluid collection. Sinuses:  The paranasal sinuses are clear. The mastoid air cells and middle ear are clear. The orbital contents are normal in appearance.     Negative noncontrast CT scan of the head / brain.     DX-CHEST-PORTABLE (1 VIEW)    Result Date: 10/4/2022  10/4/2022 2:02 PM HISTORY/REASON FOR EXAM:  Shortness of Breath. TECHNIQUE/EXAM DESCRIPTION AND NUMBER OF VIEWS: Single portable view of the chest. COMPARISON: None FINDINGS: Endotracheal tube terminates 2.1 cm of the matty. Right IJ catheter tip projects in the SVC. Cardiomediastinal silhouette is normal. Diffuse airspace and probable interstitial opacities bilaterally. Layering small pleural effusions are probably present. No pneumothorax. No acute osseous abnormality.     1.  Satisfactory lines and tubes as detailed. 2.  Diffuse bilateral pulmonary opacities are nonspecific, may be related to extensive pulmonary edema or infection. 3.  There may be layering small pleural effusions. No pneumothorax.    CT-CTA CHEST PULMONARY ARTERY W/ RECONS    Result Date: 10/5/2022  10/5/2022 10:05 PM HISTORY/REASON FOR EXAM:  Fever, dyspnea, tachycardia. Symptoms worsening TECHNIQUE/EXAM DESCRIPTION:  CT angiogram of the chest with contrast.  Transaxial MDCT scan of chest post bolus 45 cc Omnipaque 350 IV administration. MIP reconstructed images were created and reviewed. Low dose optimization technique was utilized for this CT exam including automated exposure control and adjustment of the mA and/or kV according to patient size. COMPARISON: None FINDINGS: The visualized portion of the thyroid appear within normal limits.  The trachea and main stem airways are normal in caliber. There are no pathologically enlarged mediastinal lymph nodes. The heart and pericardium appear within normal limits.   The aorta and its main branch vessels are normal in caliber and configuration.  The pulmonary arteries are well opacified without visualized filling defect. The pulmonary parenchyma demonstrates linear consolidations in the bilateral lung bases. There is moderate bilateral pleural effusion seen. Limited views of the abdomen demonstrate no acute abnormality. 11.6 mm low-density left hepatic lobe lesion is seen. The bony structures are age appropriate.     1.  No pulmonary embolus appreciated. 2.  Moderate bilateral pleural effusions. Associated linear consolidations in the lung bases suggests atelectasis, component of infiltrate not excluded. 3.  Low-density left hepatic lobe lesion, could represent small cyst or hemangioma, otherwise indeterminate. Could be followed up with three-phase CT liver for further characterization.    EC-ECHOCARDIOGRAM COMPLETE W/O CONT    Result Date: 10/8/2022  Transthoracic Echo Report Echocardiography Laboratory CONCLUSIONS No prior study is available for comparison. The left ventricular ejection fraction is visually estimated to be 55%. Unable to estimate right ventricular systolic pressure due to an inadequate tricuspid regurgitant jet. LEANDRA JONES Exam Date:         10/07/2022                    14:26 Exam Location:     Inpatient Priority:          Routine Ordering Physician:        KHRIS MADEAR                             L Referring Physician:       [id number] Niño Sonographer:               Jake ACEVEDO Age:    57     Gender:    M MRN:    [id number] :    1965 BSA:    2.64   Ht (in):    75     Wt (lb):    309 Exam Type:     Complete Indications:     Congestive Heart Failure ICD Codes:       428 CPT Codes:       34068 BP:   86     /   54     HR: Technical Quality:       Fair MEASUREMENTS  (Male / Female) Normal Values 2D ECHO LV Diastolic Diameter PLAX        5.2 cm                4.2 - 5.9 / 3.9 - 5.3 cm LV Systolic Diameter PLAX         3.8 cm                2.1 - 4.0 cm IVS Diastolic Thickness           1.4 cm                LVPW Diastolic Thickness          1.2 cm                LVOT Diameter                     2 cm                  Estimated LV Ejection Fraction    55 %                  LV Ejection Fraction MOD BP       51.4 %                >= 55  % LV Ejection Fraction MOD 4C       56.2 %                LV Ejection Fraction MOD 2C       44.3 %                DOPPLER AV Peak Velocity                  1.5 m/s               AV Peak Gradient                  9.3 mmHg              AV Mean Gradient                  4.8 mmHg              LVOT Peak Velocity                0.81 m/s              AV Area Cont Eq vti               2.1 cm2               MV Velocity Time Integral         21.7 cm               Mitral E Point Velocity           0.93 m/s              Mitral E to A Ratio               0.98                  Mitral A Duration                 142 ms                MV Pressure Half Time             81.8 ms               MV Area PHT                       2.7 cm2               MV Deceleration Time              242 ms                PV Peak Velocity                  1.1 m/s               PV Peak Gradient                  5.3 mmHg              RVOT Peak Velocity                0.96 m/s              * Indicates values subject to auto-interpretation LV EF:  55    % FINDINGS Left Ventricle Normal left ventricular  chamber size. Mild concentric left ventricular hypertrophy. Normal left ventricular systolic function. The left ventricular ejection fraction is visually estimated to be 55%. Normal regional wall motion. Indeterminate diastolic function. Right Ventricle The right ventricle is normal in size and systolic function. Right Atrium The right atrium is normal in size. Normal inferior vena cava size and inspiratory collapse. Left Atrium The left atrium is normal in size. Left atrial volume index is 17.2 mL/sq m. Mitral Valve Structurally normal mitral valve without significant stenosis or regurgitation. Aortic Valve Structurally normal aortic valve without significant stenosis or regurgitation. Tricuspid Valve Structurally normal tricuspid valve without significant stenosis. Trace tricuspid regurgitation. Unable to estimate right ventricular systolic pressure due to an inadequate tricuspid regurgitant jet. Pulmonic Valve Structurally normal pulmonic valve without significant stenosis or regurgitation. Pericardium Normal pericardium without effusion. Aorta Normal aortic root for body surface area. The ascending aorta diameter is 3.2 cm. Cam Horvath MD (Electronically Signed) Final Date:     08 October 2022                 06:44    DX-ABDOMEN FOR TUBE PLACEMENT    Result Date: 10/5/2022  10/5/2022 4:54 PM HISTORY/REASON FOR EXAM:  Line evaluation. TECHNIQUE/EXAM DESCRIPTION AND NUMBER OF VIEWS:  1 view(s) of the abdomen. COMPARISON:  None. FINDINGS: Enteric tube has been placed. The tip projects over the left upper abdomen. The bowel gas pattern is within normal limits. Limited imaging of the chest demonstrates satisfactory appearance of an ET tube and right vascular catheter. There are diffuse bilateral parenchymal hazy opacities.     1.  Enteric tube overlies the proximal stomach.      Micro:  Results       Procedure Component Value Units Date/Time    CSF CULTURE [[id number]] Collected: 10/15/22 4730    Order Status:  Completed Specimen: CSF Updated: 10/17/22 1259     Significant Indicator NEG     Source CSF     Site Tap     Culture Result No growth at 48 hours.     Gram Stain Result Rare WBCs.  No organisms seen.      Cryptococcal Antigen, CSF [[id number]] Collected: 10/15/22 1750    Order Status: Completed Updated: 10/15/22 2029     Cryptococcal Antigen, CSF Negative    GRAM STAIN [[id number]] Collected: 10/15/22 1750    Order Status: Completed Specimen: CSF Updated: 10/15/22 1926     Significant Indicator .     Source CSF     Site Tap     Gram Stain Result Rare WBCs.  No organisms seen.      CSF CULTURE [[id number]]     Order Status: No result Specimen: CSF from Tap     Cryptococcal Antigen, CSF [[id number]]     Order Status: No result             Assessment:  Active Hospital Problems    Diagnosis     *Seizure, possible meningitis/h/o tick bite, hypoxia/par Afib/anticoagulation (held) ([facility]) [R56.9]     GERD (gastroesophageal reflux disease) [K21.9]     Altered mental status [R41.82]     Pleural effusion, bilateral [J90]     Elevated liver enzymes [R74.8]     Acute respiratory failure with hypoxia ([facility]) [J96.01]     Paroxysmal A-fib ([facility]) [I48.0]     Tick bite of left lower leg [S80.862A, W57.XXXA]      Assessment:  Josh Trivedi is a 57 y.o. male with past medical history significant for paroxysmal A. fib on Xarelto, obesity, ALEXANDER and no history of epilepsy admitted 10/4/2022 from [facility] ER for dyspnea, rash, and history of tick bite.  He is initially quite encephalopathic and required intubation.  Per notes he had been in [employer] approximately 2 weeks prior to abdomen and noticed a tick on his ankle which was immediately removed.  He reports a petechial rash as well as headaches and neck pain.  Rash had improved.  He presented to [facility] and was admitted.  During his care he had a witnessed tonic-clonic seizure and was intubated.  Per report LP was done at [facility]  with mildly elevated protein at 57, glucose at 70, no report of cell count.  normal WBC normal glucose but elevated protein.  He has had persistent elevated liver enzymes.  He had increased on ceftriaxone so the concern was that this potentially was contributing.  Ceftriaxone was stopped and they have improved but are still elevated.      Problems:     Leukocytosis, resolved  Encephalopathy, ongoing, significantly improved day-Concern for encephalitis, however we were able to confirm the WBC from LP done at outside facility of 3 which is not within the range that would be suspected for bacterial or viral encephalitis.  There was some mildly elevated protein per report (57) and West Nile would be a consideration but this was not sent  Tick bite  Suspected RMSF, due to tick bite and rash, however antibodies are negative  -CSF cultures drawn at [facility] are no growth  -Serum crag-negative  -Lyme serology-pending  -Rickettsia Rickettsia high serology-neg   -Cocci serology-pending  -Aspergillus galactomannan-negative   -Fungitell-intermediate  -Urine Legionella and Streptococcus antigen-negative  -HIV negative  -CSF Borrelia burgdorferi reflex panel PCR - negative   --Respiratory viral panel negative  --Syphilis screen negative  --CMV quant pending  Transaminitis, improving   Reported seizure  Paroxysmal A. Fib      Plan:  --- Continue doxycycline 100 mg BID for a 14 day course (end 10/21/22)   -Repeat MRI of the brain on 10/12-unremarkable  --- Patient was initiated on high-dose steroids on 10/15  --- Continues on doxycycline for planned 14-day course  --- Repeat LP on 10/15 with WBC of 1, protein elevated at 84  --- Meningeoencephalitis panel-negative  --- Repeat cryptococcal antigen negative  --- West Nile antibodies pending  --- VDRL CSF pending  --- T [place] PCR pending  --- Paraneoplastic antibody pending  --- Autoimmune encephalitis panel pending  --- NMDA antibody pending  --- CSF flow cytometry  ordered   --- CMV quant with low-level viremia on 10/11-suspect this is reactionary and not contributing but as we do not have a clear etiology of this ongoing process and as he has now been started high-dose steroids which will be immune suppressing.  Patient has now had significant improvement in his encephalopathy since being started on steroids on 10/15.  We will hold off on any treatment of CMV viremia at this time.    -Repeat serum CMV quantitative PCR on 10/17-pending.  This is a send out and will take 5 to 7 days to result.  Depending on this result, consider repeating CMV PCR in 4 weeks    Disposition: [facility]    PICC line: No    Discussed with internal medicine, Dr. Velasquez and wife at bedside.  ID signing off.  Will follow pending infectious disease studies outpatient

## 2022-10-18 NOTE — PROGRESS NOTES
Hospital Medicine Daily Progress Note    Date of Service  10/18/2022    Chief Complaint  Josh Trivedi is a 57 y.o. male admitted 10/4/2022 with No chief complaint on file.      Hospital Course    Josh Trivedi is a 57 y.o. male with a history of obesity, sleep apnea, paroxysmal atrial fibrillation on Xarelto who presented 10/4/2022 with transfer from [facility] on 10/2 for worsening shortness of breath fevers and headache.  Upon admission he had been intubated and sedated due to witnessed seizure on 10/4 with altered mental status and hypoxia.     Patient is from [place] and approximately 2 weeks prior to admission had been bitten by a tick on the left lower extremity.  He had a rash of his bilateral legs associated with fever and headaches.  He went to an urgent care and was diagnosed with prostatitis and given antibiotics but he did not improve.  He went to the emergency room on 9/30 for fevers chills shortness of breath and headache.  Patient was discharged but presented again on 10/2 which led to current hospitalization.  At outside hospital he was worked up for potential tickborne illness including Lyme disease, [place] spotted fever, and meningitis.  Lumbar puncture obtained and CSF cultures, and protein (elevated 57), cell count and normal CSF glucose.  He has had elevated liver enzymes.  Patient was negative for COVID, HIV.  Infectious diseases been consulting and has concern of [place] spotted fever.      Interval Problem Update    Alert oriented x3 this morning per wife  More somnolent this afternoon  Afebrile  Discussed with ID recommendation is to hold off on treatment of CMV viremia    10/18: Patient clinically improving.  Rehab now reports the patient has done well if he does not qualify.  Tomorrow is his last day of Solu-Medrol.  We will continue doxycycline.  Will likely discharge tomorrow.    I have discussed this patient's plan of care and discharge plan at IDT rounds  today with Case Management, Nursing, Nursing leadership, and other members of the IDT team.    Consultants/Specialty  Intensivist admitted  Hospitalist  ID      Code Status  Full Code    Disposition  Patient is not medically cleared for discharge.   Anticipate discharge to  D .  I have placed the appropriate orders for post-discharge needs.    Review of Systems  Review of Systems   Constitutional:  Negative for chills and fever.   Respiratory:  Negative for shortness of breath.    Cardiovascular:  Negative for chest pain, palpitations and leg swelling.   Gastrointestinal:  Negative for abdominal pain, nausea and vomiting.   Neurological:  Negative for weakness.   All other systems reviewed and are negative.     Physical Exam  Temp:  [36.3 °C (97.3 °F)-36.7 °C (98.1 °F)] 36.7 °C (98.1 °F)  Pulse:  [] 77  Resp:  [17-18] 17  BP: (120-136)/(66-79) 121/67  SpO2:  [92 %-95 %] 93 %    Physical Exam  Vitals and nursing note reviewed.   Constitutional:       General: He is not in acute distress.     Appearance: Normal appearance. He is well-developed. He is not diaphoretic.      Comments: Asleep arousable   HENT:      Head: Normocephalic and atraumatic.      Mouth/Throat:      Pharynx: No oropharyngeal exudate.   Eyes:      General: No scleral icterus.        Right eye: No discharge.         Left eye: No discharge.   Neck:      Vascular: No JVD.      Trachea: No tracheal deviation.   Cardiovascular:      Rate and Rhythm: Normal rate and regular rhythm.      Heart sounds:     No friction rub.   Pulmonary:      Effort: Pulmonary effort is normal. No respiratory distress.      Breath sounds: Normal breath sounds. No stridor. No wheezing.   Chest:      Chest wall: No tenderness.   Abdominal:      General: Bowel sounds are normal. There is no distension.      Palpations: Abdomen is soft.      Tenderness: There is no abdominal tenderness. There is no rebound.   Musculoskeletal:         General: No tenderness.      Cervical  back: Neck supple.   Skin:     General: Skin is warm and dry.      Coloration: Skin is not jaundiced.      Nails: There is no clubbing.   Neurological:      Mental Status: He is alert.      Cranial Nerves: No cranial nerve deficit.      Motor: No abnormal muscle tone.      Comments: Patient is asleep arousable no focal deficits slow to respond   Psychiatric:         Attention and Perception: Attention and perception normal.         Mood and Affect: Mood normal.       Fluids  No intake or output data in the 24 hours ending 10/18/22 1508      Laboratory  Recent Labs     10/16/22  0351 10/18/22  0144   WBC 9.3 11.9*   RBC 5.99 4.95   HEMOGLOBIN 15.6 12.8*   HEMATOCRIT 49.2 40.5*   MCV 82.1 81.8   MCH 26.0* 25.9*   MCHC 31.7* 31.6*   RDW 43.1 43.1   PLATELETCT 324 315   MPV 9.0 9.7       Recent Labs     10/16/22  0045 10/18/22  0144   SODIUM 140 138   POTASSIUM 4.6 4.5   CHLORIDE 104 102   CO2 22 25   GLUCOSE 218* 152*   BUN 35* 39*   CREATININE 1.06 0.93   CALCIUM 9.4 8.9                         Imaging  MR-BRAIN-WITH & W/O   Final Result      1.  No acute abnormality.   2.  Mild cerebral volume loss.   3.  There is no hippocampal sclerosis, neuronal migrational abnormality or cortical dysplasia.      EC-ECHOCARDIOGRAM COMPLETE W/O CONT   Final Result      CT-CTA CHEST PULMONARY ARTERY W/ RECONS   Final Result         1.  No pulmonary embolus appreciated.   2.  Moderate bilateral pleural effusions. Associated linear consolidations in the lung bases suggests atelectasis, component of infiltrate not excluded.   3.  Low-density left hepatic lobe lesion, could represent small cyst or hemangioma, otherwise indeterminate. Could be followed up with three-phase CT liver for further characterization.      DX-ABDOMEN FOR TUBE PLACEMENT   Final Result      1.  Enteric tube overlies the proximal stomach.      CT-HEAD W/O   Final Result      Negative noncontrast CT scan of the head / brain.         DX-CHEST-PORTABLE (1 VIEW)    Final Result      1.  Satisfactory lines and tubes as detailed.   2.  Diffuse bilateral pulmonary opacities are nonspecific, may be related to extensive pulmonary edema or infection.   3.  There may be layering small pleural effusions. No pneumothorax.             Assessment/Plan  * Possible infectious meningitis- (present on admission)  Assessment & Plan  Initial concern of potential CSF infection     Evaluated by ID with recommendations to complete 2 weeks course of doxycycline given recent tick bite with rash    MRI brain normal  Formal neurology consult on 10/15/2022 with recommendation to repeat LP and initiate high-dose Solu-Medrol 1 g daily for 5 days    Discussed with ID  Follow-up on pending CSF  Overall significant improvement since started IV steroids  If clinically improved tomorrow consider discharge to rehab to complete steroid course  10/18: Patient continuing to improve.  No longer qualifies for rehab.  Will finish last dose of Solu-Medrol tomorrow and then discharge home with doxycycline.      Elevated blood pressure reading  Assessment & Plan  Likely secondary to steroids    Continue metoprolol add amlodipine and monitor    Steroid-induced hyperglycemia  Assessment & Plan  Continue insulin sliding scale monitor CBGs    GERD (gastroesophageal reflux disease)  Assessment & Plan  Prilosec and Tums as needed    Elevated liver enzymes  Assessment & Plan  Abdominal exam is benign  LFTs trended down  Ammonia normal    Altered mental status  Assessment & Plan   acute encephalopathy    Empirically treated with antibiotics given recent tick infection  Discussed with ID LP done at outside facility not consistent with meningitis  MRI with and without contrast negative  Appreciate neurology input follow-up on repeat LP results  Neurology recommending empiric treatment with Solu-Medrol 1 g daily for 5 days  Continue to monitor and avoid sedating agents    Tick bite of left lower leg  Assessment & Plan  Lower  extremity rash resolved    Serologies were negative  Lyme PCR negative    ID recommended completing 2 weeks course of doxycycline    Paroxysmal A-fib (HCC)  Assessment & Plan  CHADSVasc score:1  Holding anticoagulation for now.    Low-dose aspirin         VTE prophylaxis: Xarelto 10 mg daily as prophylaxis    I have performed a physical exam and reviewed and updated ROS and Plan today (10/18/2022). In review of yesterday's note (10/17/2022), there are no changes except as documented above.

## 2022-10-18 NOTE — PROGRESS NOTES
Monitor summary: SR 73-92, [place] .18, QRS .08, QT .39, with rare PVCs and couplets and occasional PACs per strip from monitor room.

## 2022-10-18 NOTE — CARE PLAN
The patient is Stable - Low risk of patient condition declining or worsening    Shift Goals  Clinical Goals: Safety, stable neuro exam  Patient Goals: Get stronger  Family Goals: Go home    Progress made toward(s) clinical / shift goals:  Patient is AAOx4 but he is confused in conversation at times. Patient is redirectable.Education provided to patient on safety and using call light for assistance. Patient demonstrates understanding at times but is impulsive at other times. Respiratory therapist came to bedside and provided education to patient and his CPAP machine. Will continue to monitor.    Problem: Knowledge Deficit - Standard  Goal: Patient and family/care givers will demonstrate understanding of plan of care, disease process/condition, diagnostic tests and medications  Outcome: Progressing   Patient understands POC but needs redirecting at times  Problem: Skin Integrity  Goal: Skin integrity is maintained or improved  Outcome: Progressing  Skin remains intact, he is able to mobilize   Problem: Fall Risk  Goal: Patient will remain free from falls  Outcome: Progressing   He remains free from falls    Patient is not progressing towards the following goals: N/A

## 2022-10-18 NOTE — CARE PLAN
The patient is Stable - Low risk of patient condition declining or worsening    Shift Goals  Clinical Goals: safety; stable neuro  Patient Goals: discharge  Family Goals: go home    Progress made toward(s) clinical / shift goals:  Pt. Is A&Ox4, follows commands and responds appropriately. Pt. Awaiting possible rehab placement    Patient is not progressing towards the following goals: n/a

## 2022-10-18 NOTE — THERAPY
Physical Therapy   Daily Treatment     Patient Name: Josh Trivedi  Age:  57 y.o., Sex:  male  Medical Record #: [id number]  Today's Date: 10/18/2022    Precautions: Fall Risk;Swallow Precautions ( See Comments)    Assessment    Patient agreeable to PT tx session.  He mobilized as detailed below, ambulating ~400 ft without AD.  Patient demonstrated gait with head turns, speed changes, and quick pivot turns with minimal deviation.  Patient continues to be slightly impulsive despite education for safety and demonstrated slightly delayed responses and processing.  Patient would benefit from additional PT services to progress higher level balance and gait tasks.  Please defer to SLP recommendations as cognition continues to be primary deficit.  All acute PT goals met.  Patient will not be actively followed for physical therapy services at this time, however may be seen if requested by physician for 1 more visit within 30 days to address any discharge or equipment needs.    Plan    Discharge secondary to goals met.    DC Equipment Recommendations: None  Discharge Recommendations: Recommend home health for continued physical therapy services (However please defer to SLP recommendations given cognition is primary deficit.  May benefit from additional PT for higher level balance and mobility tasks.)     Objective     10/18/22 0958   Precautions   Precautions Fall Risk;Swallow Precautions ( See Comments)   Cognition    Cognition / Consciousness X   Level of Consciousness Alert   Comments Pleasant & cooperative, min cues for redirection due to attention and slightly delayed responses   Balance   Sitting Balance (Static) Fair +   Sitting Balance (Dynamic) Fair +   Standing Balance (Static) Fair   Standing Balance (Dynamic) Fair -   Weight Shift Sitting Fair   Weight Shift Standing Fair   Skilled Intervention Verbal Cuing;Tactile Cuing   Gait Analysis   Gait Level Of Assist Supervised   Assistive Device None   Distance (Feet)  400   # of Times Distance was Traveled 1   Deviation (quick pace)   # of Stairs Climbed 5   Level of Assist with Stairs Supervised   Weight Bearing Status No restrictions   Skilled Intervention Verbal Cuing;Tactile Cuing   Comments Able to maintain forward ambulation with minimal deviation with head turns, speed changes, and pivot turning   Bed Mobility    Supine to Sit Supervised   Sit to Supine (NT, left up in chair)   Scooting Supervised   Skilled Intervention Verbal Cuing   Functional Mobility   Sit to Stand Supervised   Bed, Chair, Wheelchair Transfer Supervised   Transfer Method Stand Step   Mobility bed mobility, ambulation, stairs   Skilled Intervention Verbal Cuing;Tactile Cuing   Activity Tolerance   Sitting in Chair Post session   Sitting Edge of Bed 2 min   Standing 10 min   Short Term Goals    Short Term Goal # 1 Pt will perform bed<>chair transfers with SPV no AD within 6 visits to increase OOB activity.   Goal Outcome # 1 Goal met   Short Term Goal # 2 Pt will amb >300ft with SPV no AD within 6 visits to progress toward community distances at [facility].   Goal Outcome # 2 Goal met   Short Term Goal # 3 Pt will ascend/descend 1 step with SPV no AD within 6 visits to access main level of home upon DC.   Goal Outcome # 3 Goal met   Anticipated Discharge Equipment and Recommendations   DC Equipment Recommendations None   Discharge Recommendations Recommend home health for continued physical therapy services (However please defer to SLP recommendations given cognition is primary deficit.  May benefit from additional PT for higher level balance and mobility tasks.)

## 2022-10-18 NOTE — DIETARY
Nutrition Services Brief Update:    Day 14 of admit.  Josh Trivedi is a 57 y.o. male with admitting DX of Seizure (HCC) [R56.9]    Pt is on regular diet with no straws. PO intake significantly increased and is eating % of meals consistently. Last weight > 1 week ago, previously weight trending down and is fluid-related. No wounds, last BM on 10/17.     Problem: Nutritional:  Goal: Achieve adequate nutritional intake  Description: Patient will consume 50% of meals  Outcome: MET    No further nutrition intervention needed, RD to monitor per department policy.

## 2022-10-18 NOTE — DISCHARGE PLANNING
Would appreciate an updated PT eval once appropriate.  WBC elevated.    1007-Dr. Velasquez has medically cleared.  Case is under review by Dr. Bello.     1122-Case reviewed with Administration.  Josh is no longer requiring 2 of 3 disciplines.  [facility] will no longer follow.  Please reach out to myself with any interval changes/questions.  Msg placed to Dr. Velasquez & Stephani FLORES.

## 2022-10-18 NOTE — DISCHARGE PLANNING
Pt lives in [place], [place].  PT/OT recommending HH. Pt will have to see PCP for referral for choices of HH.      Have informed Dr. Velasquez.      No other needs at this time.

## 2022-10-19 ENCOUNTER — PHARMACY VISIT (OUTPATIENT)
Dept: PHARMACY | Facility: MEDICAL CENTER | Age: 57
End: 2022-10-19
Payer: COMMERCIAL

## 2022-10-19 VITALS
TEMPERATURE: 97.3 F | DIASTOLIC BLOOD PRESSURE: 76 MMHG | OXYGEN SATURATION: 93 % | RESPIRATION RATE: 17 BRPM | SYSTOLIC BLOOD PRESSURE: 112 MMHG | BODY MASS INDEX: 36.46 KG/M2 | WEIGHT: 293.21 LBS | HEART RATE: 74 BPM | HEIGHT: 75 IN

## 2022-10-19 LAB
ARSENIC BLD-MCNC: <10 UG/L
CADMIUM BLD-MCNC: <1 UG/L
EST. AVERAGE GLUCOSE BLD GHB EST-MCNC: 146 MG/DL
GLUCOSE BLD STRIP.AUTO-MCNC: 136 MG/DL (ref 65–99)
HBA1C MFR BLD: 6.7 % (ref 4–5.6)
LEAD BLDV-MCNC: <2 UG/DL
MERCURY BLD-MCNC: <2.5 UG/L
OLIGOCLONAL BANDS CSF ELPH-IMP: NORMAL
OLIGOCLONAL BANDS CSF IEF: NORMAL BANDS (ref 0–1)
OLIGOCLONAL BANDS.IT SER+CSF QL: NEGATIVE
TEST NAME 95000: NORMAL
VDRL CSF QL: NON REACTIVE

## 2022-10-19 PROCEDURE — 700111 HCHG RX REV CODE 636 W/ 250 OVERRIDE (IP): Performed by: HOSPITALIST

## 2022-10-19 PROCEDURE — 700102 HCHG RX REV CODE 250 W/ 637 OVERRIDE(OP): Performed by: STUDENT IN AN ORGANIZED HEALTH CARE EDUCATION/TRAINING PROGRAM

## 2022-10-19 PROCEDURE — 83036 HEMOGLOBIN GLYCOSYLATED A1C: CPT

## 2022-10-19 PROCEDURE — A9270 NON-COVERED ITEM OR SERVICE: HCPCS | Performed by: STUDENT IN AN ORGANIZED HEALTH CARE EDUCATION/TRAINING PROGRAM

## 2022-10-19 PROCEDURE — 82962 GLUCOSE BLOOD TEST: CPT

## 2022-10-19 PROCEDURE — 99239 HOSP IP/OBS DSCHRG MGMT >30: CPT | Performed by: STUDENT IN AN ORGANIZED HEALTH CARE EDUCATION/TRAINING PROGRAM

## 2022-10-19 PROCEDURE — 700102 HCHG RX REV CODE 250 W/ 637 OVERRIDE(OP): Performed by: HOSPITALIST

## 2022-10-19 PROCEDURE — 90471 IMMUNIZATION ADMIN: CPT

## 2022-10-19 PROCEDURE — 97130 THER IVNTJ EA ADDL 15 MIN: CPT

## 2022-10-19 PROCEDURE — 97535 SELF CARE MNGMENT TRAINING: CPT | Mod: CO

## 2022-10-19 PROCEDURE — A9270 NON-COVERED ITEM OR SERVICE: HCPCS | Performed by: HOSPITALIST

## 2022-10-19 PROCEDURE — RXMED WILLOW AMBULATORY MEDICATION CHARGE: Performed by: STUDENT IN AN ORGANIZED HEALTH CARE EDUCATION/TRAINING PROGRAM

## 2022-10-19 PROCEDURE — 36415 COLL VENOUS BLD VENIPUNCTURE: CPT

## 2022-10-19 PROCEDURE — 700102 HCHG RX REV CODE 250 W/ 637 OVERRIDE(OP): Performed by: INTERNAL MEDICINE

## 2022-10-19 PROCEDURE — 90686 IIV4 VACC NO PRSV 0.5 ML IM: CPT | Performed by: STUDENT IN AN ORGANIZED HEALTH CARE EDUCATION/TRAINING PROGRAM

## 2022-10-19 PROCEDURE — A9270 NON-COVERED ITEM OR SERVICE: HCPCS | Performed by: INTERNAL MEDICINE

## 2022-10-19 PROCEDURE — 97129 THER IVNTJ 1ST 15 MIN: CPT

## 2022-10-19 PROCEDURE — 700105 HCHG RX REV CODE 258: Performed by: HOSPITALIST

## 2022-10-19 PROCEDURE — 700111 HCHG RX REV CODE 636 W/ 250 OVERRIDE (IP): Performed by: STUDENT IN AN ORGANIZED HEALTH CARE EDUCATION/TRAINING PROGRAM

## 2022-10-19 RX ORDER — DOXYCYCLINE 100 MG/1
100 TABLET ORAL EVERY 12 HOURS
Qty: 5 TABLET | Refills: 0 | Status: SHIPPED | OUTPATIENT
Start: 2022-10-19 | End: 2022-10-22

## 2022-10-19 RX ADMIN — SENNOSIDES AND DOCUSATE SODIUM 2 TABLET: 50; 8.6 TABLET ORAL at 05:22

## 2022-10-19 RX ADMIN — INFLUENZA A VIRUS A/VICTORIA/2570/2019 IVR-215 (H1N1) ANTIGEN (FORMALDEHYDE INACTIVATED), INFLUENZA A VIRUS A/DARWIN/9/2021 SAN-010 (H3N2) ANTIGEN (FORMALDEHYDE INACTIVATED), INFLUENZA B VIRUS B/PHUKET/3073/2013 ANTIGEN (FORMALDEHYDE INACTIVATED), AND INFLUENZA B VIRUS B/MICHIGAN/01/2021 ANTIGEN (FORMALDEHYDE INACTIVATED) 0.5 ML: 15; 15; 15; 15 INJECTION, SUSPENSION INTRAMUSCULAR at 09:41

## 2022-10-19 RX ADMIN — SODIUM CHLORIDE 1000 MG: 9 INJECTION, SOLUTION INTRAVENOUS at 05:22

## 2022-10-19 RX ADMIN — OMEPRAZOLE 20 MG: 20 CAPSULE, DELAYED RELEASE ORAL at 05:22

## 2022-10-19 RX ADMIN — ASPIRIN 81 MG 81 MG: 81 TABLET ORAL at 05:22

## 2022-10-19 RX ADMIN — METOPROLOL SUCCINATE 25 MG: 25 TABLET, FILM COATED, EXTENDED RELEASE ORAL at 05:22

## 2022-10-19 RX ADMIN — DOXYCYCLINE 100 MG: 100 TABLET, FILM COATED ORAL at 05:24

## 2022-10-19 RX ADMIN — AMLODIPINE BESYLATE 5 MG: 5 TABLET ORAL at 05:22

## 2022-10-19 ASSESSMENT — COGNITIVE AND FUNCTIONAL STATUS - GENERAL
SUGGESTED CMS G CODE MODIFIER DAILY ACTIVITY: CK
TOILETING: A LITTLE
DRESSING REGULAR UPPER BODY CLOTHING: A LITTLE
DRESSING REGULAR LOWER BODY CLOTHING: A LITTLE
HELP NEEDED FOR BATHING: A LITTLE
PERSONAL GROOMING: A LITTLE
DAILY ACTIVITIY SCORE: 19

## 2022-10-19 ASSESSMENT — PAIN DESCRIPTION - PAIN TYPE: TYPE: ACUTE PAIN

## 2022-10-19 NOTE — PROGRESS NOTES
Monitor Summary: SB/ST 61-80, [place] .17, QRS .08, QT .38, rare PVCs and PACs per strip from monitor room.

## 2022-10-19 NOTE — DISCHARGE INSTRUCTIONS
Discharge Instructions    Discharged to home by car with relative. Discharged via walking, hospital escort: Yes.  Special equipment needed: Not Applicable    Be sure to schedule a follow-up appointment with your primary care doctor or any specialists as instructed.     Discharge Plan:   Diet Plan: Discussed  Activity Level: Discussed  Confirmed Follow up Appointment: Patient to Call and Schedule Appointment  Confirmed Symptoms Management: Discussed  Medication Reconciliation Updated: Yes    I understand that a diet low in cholesterol, fat, and sodium is recommended for good health. Unless I have been given specific instructions below for another diet, I accept this instruction as my diet prescription.   Other diet: Regular    Special Instructions: None    -Is this patient being discharged with medication to prevent blood clots?  Yes, Xarelto   Rivaroxaban oral tablets  What is this medicine?  RIVAROXABAN (ri va CHAMP a ban) is an anticoagulant (blood thinner). It is used to treat blood clots in the lungs or in the veins. It is also used to prevent blood clots in the lungs or in the veins. It is also used to lower the chance of stroke in people with a medical condition called atrial fibrillation.  This medicine may be used for other purposes; ask your health care provider or pharmacist if you have questions.  COMMON BRAND NAME(S): Xarelto, Xarelto Starter Pack  What should I tell my health care provider before I take this medicine?  They need to know if you have any of these conditions:  antiphospholipid antibody syndrome  artificial heart valve  bleeding disorders  bleeding in the brain  blood in your stools (black or tarry stools) or if you have blood in your vomit  history of blood clots  history of stomach bleeding  kidney disease  liver disease  low blood counts, like low white cell, platelet, or red cell counts  recent or planned spinal or epidural procedure  take medicines that treat or prevent blood clots  an  unusual or allergic reaction to rivaroxaban, other medicines, foods, dyes, or preservatives  pregnant or trying to get pregnant  breast-feeding  How should I use this medicine?  Take this medicine by mouth with a glass of water. Follow the directions on the prescription label. Take your medicine at regular intervals. Do not take it more often than directed. Do not stop taking except on your doctor's advice. Stopping this medicine may increase your risk of a blood clot. Be sure to refill your prescription before you run out of medicine.  If you are taking this medicine after hip or knee replacement surgery, take it with or without food. If you are taking this medicine for atrial fibrillation, take it with your evening meal. If you are taking this medicine to treat blood clots, take it with food at the same time each day. If you are unable to swallow your tablet, you may crush the tablet and mix it in applesauce. Then, immediately eat the applesauce. You should eat more food right after you eat the applesauce containing the crushed tablet.  Talk to your pediatrician regarding the use of this medicine in children. Special care may be needed.  Overdosage: If you think you have taken too much of this medicine contact a poison control center or emergency room at once.  NOTE: This medicine is only for you. Do not share this medicine with others.  What if I miss a dose?  If you take your medicine once a day and miss a dose, take the missed dose as soon as you remember. If it is almost time for your next dose, take only that dose. Do not take double or extra doses.  If you take your medicine twice a day and miss a dose, take the missed dose immediately. In this instance, 2 tablets may be taken at the same time. The next day you should take 1 tablet twice a day as directed.  What may interact with this medicine?  Do not take this medicine with any of the following medications:  defibrotide  This medicine may also interact  with the following medications:  aspirin and aspirin-like medicines  certain antibiotics like erythromycin, azithromycin, and clarithromycin  certain medicines for fungal infections like ketoconazole and itraconazole  certain medicines for irregular heart beat like amiodarone, quinidine, dronedarone  certain medicines for seizures like carbamazepine, phenytoin  certain medicines that treat or prevent blood clots like warfarin, enoxaparin, and dalteparin  conivaptan  felodipine  indinavir  lopinavir; ritonavir  NSAIDS, medicines for pain and inflammation, like ibuprofen or naproxen  ranolazine  rifampin  ritonavir  SNRIs, medicines for depression, like desvenlafaxine, duloxetine, levomilnacipran, venlafaxine  SSRIs, medicines for depression, like citalopram, escitalopram, fluoxetine, fluvoxamine, paroxetine, sertraline  [place]'s wort  verapamil  This list may not describe all possible interactions. Give your health care provider a list of all the medicines, herbs, non-prescription drugs, or dietary supplements you use. Also tell them if you smoke, drink alcohol, or use illegal drugs. Some items may interact with your medicine.  What should I watch for while using this medicine?  Visit your healthcare professional for regular checks on your progress. You may need blood work done while you are taking this medicine. Your condition will be monitored carefully while you are receiving this medicine. It is important not to miss any appointments.  Avoid sports and activities that might cause injury while you are using this medicine. Severe falls or injuries can cause unseen bleeding. Be careful when using sharp tools or knives. Consider using an electric razor. Take special care brushing or flossing your teeth. Report any injuries, bruising, or red spots on the skin to your healthcare professional.  If you are going to need surgery or other procedure, tell your healthcare professional that you are taking this  medicine.  Wear a medical ID bracelet or chain. Carry a card that describes your disease and details of your medicine and dosage times.  What side effects may I notice from receiving this medicine?  Side effects that you should report to your doctor or health care professional as soon as possible:  allergic reactions like skin rash, itching or hives, swelling of the face, lips, or tongue  back pain  redness, blistering, peeling or loosening of the skin, including inside the mouth  signs and symptoms of bleeding such as bloody or black, tarry stools; red or dark-brown urine; spitting up blood or brown material that looks like coffee grounds; red spots on the skin; unusual bruising or bleeding from the eye, gums, or nose  signs and symptoms of a blood clot such as chest pain; shortness of breath; pain, swelling, or warmth in the leg  signs and symptoms of a stroke such as changes in vision; confusion; trouble speaking or understanding; severe headaches; sudden numbness or weakness of the face, arm or leg; trouble walking; dizziness; loss of coordination  Side effects that usually do not require medical attention (report to your doctor or health care professional if they continue or are bothersome):  dizziness  muscle pain  This list may not describe all possible side effects. Call your doctor for medical advice about side effects. You may report side effects to FDA at 9-043-FDA-9725.  Where should I keep my medicine?  Keep out of the reach of children.  Store at room temperature between 15 and 30 degrees C (59 and 86 degrees F). Throw away any unused medicine after the expiration date.  NOTE: This sheet is a summary. It may not cover all possible information. If you have questions about this medicine, talk to your doctor, pharmacist, or health care provider.  © 2020 [employer] (2020-03-16 09:45:59)    Is patient discharged on Warfarin / Coumadin?   No

## 2022-10-19 NOTE — PROGRESS NOTES
Monitor Summary: SB/ST , [place] -0.18, QRS -0.06, QT -0.42, with rare PVC/PACs per strip from the monitor room.

## 2022-10-19 NOTE — DISCHARGE PLANNING
Fabiana w/[facility]  has accepted pt. And requesting D/C summary and will set up time and date to see pt.  Informed them that he d/cing today.      Requested to send necessary inform.  Roselyn hallman all inform. Requested.

## 2022-10-19 NOTE — PROGRESS NOTES
Monitor Summary: SB/SR 52-67, [place] .17, QRS .07, QT .35, rare PVCs with a sinus pause of 2.9 seconds, and two sinus arrests from 3.4 to 5.1 seconds per strip from monitor room.

## 2022-10-19 NOTE — DISCHARGE SUMMARY
Discharge Summary    CHIEF COMPLAINT ON ADMISSION  No chief complaint on file.      Reason for Admission  Respiratory Failure     Admission Date  10/4/2022    CODE STATUS  Full Code    HPI & HOSPITAL COURSE  Josh Trivedi is a 57 y.o. male with a history of obesity, sleep apnea, paroxysmal atrial fibrillation on Xarelto who presented 10/4/2022 with transfer from [facility] on 10/2 for worsening shortness of breath fevers and headache.  Upon admission he had been intubated and sedated due to witnessed seizure on 10/4 with altered mental status and hypoxia.     Patient is from [place] and approximately 2 weeks prior to admission had been bitten by a tick on the left lower extremity.  He had a rash of his bilateral legs associated with fever and headaches.  He went to an urgent care and was diagnosed with prostatitis and given antibiotics but he did not improve.  He went to the emergency room on 9/30 for fevers chills shortness of breath and headache.  Patient was discharged but presented again on 10/2 which led to current hospitalization.  At outside hospital he was worked up for potential tickborne illness including Lyme disease, [place] spotted fever, and meningitis.  Lumbar puncture obtained and CSF cultures, and protein (elevated 57), cell count and normal CSF glucose.  He has had elevated liver enzymes.  Patient was negative for COVID, HIV.  Infectious diseases been consulting and has concern of [place] spotted fever.      Mentation improved. Completed 5 day course of solumedrol. Improved with Doxycycline. Suspect he had [place] spotted fever.      Therefore, he is discharged in good and stable condition to home with close outpatient follow-up.    The patient met 2-midnight criteria for an inpatient stay at the time of discharge.    Discharge Date  10/19/2022    FOLLOW UP ITEMS POST DISCHARGE  Suspected [place] Spoted Fever    DISCHARGE DIAGNOSES  Principal Problem:     Possible infectious meningitis POA: Yes  Active Problems:    Paroxysmal A-fib (HCC) POA: Unknown    Tick bite of left lower leg POA: Unknown    Altered mental status POA: Unknown    Pleural effusion, bilateral POA: Unknown    Elevated liver enzymes POA: Unknown    GERD (gastroesophageal reflux disease) POA: Unknown    Hypokalemia POA: Unknown    Steroid-induced hyperglycemia POA: Unknown    Elevated blood pressure reading POA: Unknown  Resolved Problems:    Acute respiratory failure with hypoxia (HCC) POA: Unknown    Petechial rash POA: Unknown      FOLLOW UP  No future appointments.  No follow-up provider specified.    MEDICATIONS ON DISCHARGE     Medication List        START taking these medications        Instructions   doxycycline monohydrate 100 MG tablet  Commonly known as: ADOXA   Take 1 Tablet by mouth every 12 hours for 2 days.  Dose: 100 mg            CONTINUE taking these medications        Instructions   Metoprolol Succinate 25 MG Cs24   Take 25 mg by mouth every evening.  Dose: 25 mg     rivaroxaban 20 MG Tabs tablet  Commonly known as: XARELTO   Take 20 mg by mouth with dinner.  Dose: 20 mg              Allergies  Allergies   Allergen Reactions    Penicillins Rash     Rash as a child; tolerated ampicillin & ceftriaxone Oct 2022       DIET  Orders Placed This Encounter   Procedures    Diet Order Diet: Regular; Tray Modifications (optional): No Straws     Standing Status:   Standing     Number of Occurrences:   1     Order Specific Question:   Diet:     Answer:   Regular [1]     Order Specific Question:   Tray Modifications (optional)     Answer:   No Straws       ACTIVITY  As tolerated.  Weight bearing as tolerated    CONSULTATIONS  ID-Dr Richard  [facility]Dr Branch  [facility]      PROCEDURES  10/15: LP  10/13/22: EEG  10/6: LP    LABORATORY  Lab Results   Component Value Date    SODIUM 138 10/18/2022    POTASSIUM 4.5 10/18/2022    CHLORIDE 102 10/18/2022    CO2 25 10/18/2022    GLUCOSE 152  (H) 10/18/2022    BUN 39 (H) 10/18/2022    CREATININE 0.93 10/18/2022        Lab Results   Component Value Date    WBC 11.9 (H) 10/18/2022    HEMOGLOBIN 12.8 (L) 10/18/2022    HEMATOCRIT 40.5 (L) 10/18/2022    PLATELETCT 315 10/18/2022        Total time of the discharge process exceeds 36 minutes.

## 2022-10-19 NOTE — DISCHARGE PLANNING
Case Management Discharge Planning    Admission Date: 10/4/2022  GMLOS: 5  ALOS: 15    6-Clicks ADL Score: 19  6-Clicks Mobility Score: 19      Anticipated Discharge Dispo: Discharge Disposition: D/T to home under [facility] care in anticipation of covered skilled care (06)  Discharge Address: [facility] [address], [facility] [place], [address]  Discharge Contact Phone Number: [phone]    DME Needed: No    Action(s) Taken: Updated Provider/Nurse on Discharge Plan, DC Assessment Complete (See below), Choice obtained, Referral(s) sent, DME Face to Face , Acceptance Received, and Authorization Received     Escalations Completed: None    Medically Clear: Yes    Next Steps: [facility] has accepted pt. And will be calling to set up SOC.  Pt appreciative.    Barriers to Discharge: None

## 2022-10-19 NOTE — THERAPY
Speech Language Pathology  Daily Treatment     Patient Name: Josh Trivedi  Age:  57 y.o., Sex:  male  Medical Record #: [id number]  Today's Date: 10/19/2022     Precautions  Precautions: Fall Risk    HPI: Pt is a 57 y.o. man admitted on 10/4/22 as a transfer from [facility] for worsening SOB, fevers, and headache; hypoxic, witnessed seizure, intubated 10/4-10/7 (76 hours). CSE 10/11: RG7/TN0 w/ precautions    Subjective  Pt agreeable and cooperative w/ SLP tx session this date. Verbalized desire to d/c (pending labs). Pt and wife report improvements in cognition and tolerance of RG7/TN0 diet w/o s/sx of dysphagia.     Assessment  Pt Aox4, answered eogcentric open-set questions w/o cues 100%. Functional recall of conversations w/ family from night before, improved attention during conversation, contingency of answers, and appropriate response time. Portions of the Cognistat and other portions of cognitive assessment were re-administered for new baseline prior to d/c.      Cognistat  Attention: Mild  Memory: Moderate  Calculations: Average  Similarities: Average  Judgment: Average    Clock Drawing  Good organization and planning demonstrated. Clock drawing scored 11/13, indicating mild impairment. Errors as follows: numbers written outside the [place], s/c numerical placement, hands not originating from center of clock.     Medication Management  Pt w/ 75% accuracy for written calculation of temporal and numerical problems. Fair synthesis of information for sequencing of IADLs. Appropriate problem solving demonstrated for memory enhancing aids.     Clinical Impressions  Pt presents w/ mild-moderate cognitive impairment, evidenced by impairments in attention, memory, and EF. Pt w/ significant improvement since onset of hospitalization and initial cognitve evaluation. New goals written, and pt would benefit from intermittent A w/ IADLs and skilled SLP services for cognition in HH upon d/c.     Note: It is not within the scope  "of practice of Speech-Language Pathologists to determine patient capacity. Please defer to the physician or psych to complete this assessment.    Recommendations  Supervision Needs Upon Discharge: The pt will benefit from intermittent assistance for the following IADLs: Medication management, Financial management, Appointment management, Household chores, Cooking.      Plan    Treatment plan modified to 3 times per week until therapy goals are met for the following treatments:  Cognitive-Linguistic Training and Patient / Family / Caregiver Education.    Discharge Recommendations: Recommend home health for continued speech therapy services       Objective   10/19/22 1027   Vitals   O2 Delivery Device None - Room Air   Verbal Expression   Verbal Output Functional Supervision (5)   Word Finding Deficits Minimal (4)   Auditory Comprehension   Understands Complex Conversation Supervision (5)   Reading Comprehension   Reading Sentences Within Functional Limits (6-7)   Following Written Direction Minimal (4)   Functional Reading Materials Minimal (4)   Written Expression   Formulates: Sentence Within Functional Limits (6-7)   Cognitive-Linguistic   Level of Consciousness Alert   Orientation Level Oriented x 4   Sustained Attention Minimal (4)   Divided Attention Minimal (4)   Short Term Memory Moderate (3)   Immediate Memory Minimal (4)   Executive Functioning / Organization Minimal (4)   Functional Sequencing for ADL / IADLs Minimal (4)   Auditory Math Within Functional Limits (6-7)   Medication Management  Moderate (3)   Clock Drawing Other (See Comments)  (Numbers outside clock)   Social / Pragmatic Communication   Social / Pragmatic Communication WDL   Patient / Family Goals   Patient / Family Goal #1 \"Taking on issues with cognition\"   Goal #1 Outcome Progressing as expected   Short Term Goals   Short Term Goal # 1 Patient will consume meals of RG7/TN0 with swallow precautions without overt indicators of aspiration or " decline in pulmonary status.   Goal Outcome # 1 Goal met  (Per pt and family report)   Short Term Goal # 2 Pt will correctly answer open set questions related to orientation and biographical information given min cues from SLP w/ 90% accuracy across three sessions.   Goal Outcome # 2  Goal met   Short Term Goal # 3 Pt will recall functional information concerning safety precautions given five minute delay and min cues from SLP w/ 80% accuracy.   Goal Outcome  # 3 Goal met, new goal added   Short Term Goal # 3 B  Pt will demonstrate or explain use of five functional memory enhancing aids to increase w/ I in IADLs w/ min cues from SLP.   Short Term Goal # 4 Pt will complete word finding tasks, including naming to definition and divergent naming of 5 words in a category, given min cues from SLP w/ 80% accuracy.   Short Term Goal # 5 Pt will correctly answer functional problem-solving tasks given min cues from SLP w/ 80% accuracy in order to improve safety and independence w/ IADLs.   Goal Outcome  # 5 Goal met, new goal added   Short Term Goal # 5 B  Pt will complete medication management tasks given min cues from SLP w/ 90% accuracy.   Education Group   Education Provided Traumatic Brain Injury / Cognitive-Linguistic;Role of Speech Therapy   Additional Comments Pt and pt's wife agreeable to SLP services for cognition in HH.   Interdisciplinary Plan of Care Collaboration   IDT Collaboration with  Nursing;Family / Caregiver

## 2022-10-21 LAB
CMV DNA # SERPL NAA+PROBE: ABNORMAL {COPIES}/ML
CMV DNA SERPL NAA+PROBE-ACNC: ABNORMAL [IU]/ML
CMV DNA SERPL NAA+PROBE-LOG IU: 3.3 LOG IU/ML
CMV DNA SERPL NAA+PROBE-LOG#: 3.5 LOG CPY/ML
CMV GENE MUT DET ISLT: DETECTED
CMV PCR SOURCE Q4398: ABNORMAL

## 2022-10-25 LAB
AMPAR 1+2 IGG SERPL QL IF: ABNORMAL
AQP4 H2O CHANNEL IGG SERPL QL IF: ABNORMAL
CASPR2 IGG SERPL QL IF: ABNORMAL
DPPX IGG SER QL CBA IFA: ABNORMAL
GABABR IGG SERPL QL CBA IFA: ABNORMAL
GAD65 AB SER IA-ACNC: <5 IU/ML (ref 0–5)
LGI1 IGG SERPL QL IF: ABNORMAL
MOG AB SER QL CBA IFA: ABNORMAL
NMDAR IGG TITR SER IF: ABNORMAL {TITER}
VGKC AB SER-SCNC: 38 PMOL/L (ref 0–31)

## 2022-10-28 LAB — TEST NAME 95000: NORMAL

## 2022-10-29 LAB
TEST NAME 95000: NORMAL
TWHIPPLEI PCR Q0087: NOT DETECTED
TWHIPPLEI PCR SRC Q0086: NORMAL

## 2023-03-02 ENCOUNTER — APPOINTMENT (OUTPATIENT)
Dept: NEUROLOGY | Facility: MEDICAL CENTER | Age: 58
End: 2023-03-02
Attending: PSYCHIATRY & NEUROLOGY
Payer: COMMERCIAL